# Patient Record
Sex: FEMALE | Race: BLACK OR AFRICAN AMERICAN | Employment: OTHER | URBAN - METROPOLITAN AREA
[De-identification: names, ages, dates, MRNs, and addresses within clinical notes are randomized per-mention and may not be internally consistent; named-entity substitution may affect disease eponyms.]

---

## 2017-01-03 ENCOUNTER — HOSPITAL ENCOUNTER (EMERGENCY)
Age: 77
Discharge: HOME | End: 2017-01-03
Attending: EMERGENCY MEDICINE
Payer: MEDICARE

## 2017-01-03 ENCOUNTER — APPOINTMENT (OUTPATIENT)
Dept: CT IMAGING | Age: 77
End: 2017-01-03
Attending: EMERGENCY MEDICINE
Payer: MEDICARE

## 2017-01-03 ENCOUNTER — APPOINTMENT (OUTPATIENT)
Dept: GENERAL RADIOLOGY | Age: 77
End: 2017-01-03
Attending: EMERGENCY MEDICINE
Payer: MEDICARE

## 2017-01-03 VITALS
HEIGHT: 64 IN | OXYGEN SATURATION: 97 % | SYSTOLIC BLOOD PRESSURE: 125 MMHG | TEMPERATURE: 98.5 F | BODY MASS INDEX: 25.61 KG/M2 | HEART RATE: 78 BPM | RESPIRATION RATE: 23 BRPM | WEIGHT: 150 LBS | DIASTOLIC BLOOD PRESSURE: 53 MMHG

## 2017-01-03 DIAGNOSIS — E86.0 DEHYDRATION: Primary | ICD-10-CM

## 2017-01-03 DIAGNOSIS — I50.9 CONGESTIVE HEART FAILURE, UNSPECIFIED CONGESTIVE HEART FAILURE CHRONICITY, UNSPECIFIED CONGESTIVE HEART FAILURE TYPE: ICD-10-CM

## 2017-01-03 DIAGNOSIS — N39.0 URINARY TRACT INFECTION WITH HEMATURIA, SITE UNSPECIFIED: ICD-10-CM

## 2017-01-03 DIAGNOSIS — R31.9 URINARY TRACT INFECTION WITH HEMATURIA, SITE UNSPECIFIED: ICD-10-CM

## 2017-01-03 LAB
ALBUMIN SERPL BCP-MCNC: 2.9 G/DL (ref 3.5–5)
ALBUMIN/GLOB SERPL: 0.7 {RATIO} (ref 1.1–2.2)
ALP SERPL-CCNC: 103 U/L (ref 45–117)
ALT SERPL-CCNC: 56 U/L (ref 12–78)
ANION GAP BLD CALC-SCNC: 8 MMOL/L (ref 5–15)
APPEARANCE UR: ABNORMAL
AST SERPL W P-5'-P-CCNC: 46 U/L (ref 15–37)
BACTERIA URNS QL MICRO: ABNORMAL /HPF
BASOPHILS # BLD AUTO: 0 K/UL (ref 0–0.1)
BASOPHILS # BLD: 0 % (ref 0–1)
BILIRUB SERPL-MCNC: 0.5 MG/DL (ref 0.2–1)
BILIRUB UR QL: NEGATIVE
BNP SERPL-MCNC: ABNORMAL PG/ML (ref 0–450)
BUN SERPL-MCNC: 27 MG/DL (ref 6–20)
BUN/CREAT SERPL: 26 (ref 12–20)
CALCIUM SERPL-MCNC: 8.3 MG/DL (ref 8.5–10.1)
CHLORIDE SERPL-SCNC: 109 MMOL/L (ref 97–108)
CK MB CFR SERPL CALC: 1.9 % (ref 0–2.5)
CK MB SERPL-MCNC: 1.2 NG/ML (ref 5–25)
CK SERPL-CCNC: 62 U/L (ref 26–192)
CO2 SERPL-SCNC: 25 MMOL/L (ref 21–32)
COLOR UR: ABNORMAL
CREAT SERPL-MCNC: 1.03 MG/DL (ref 0.55–1.02)
EOSINOPHIL # BLD: 0.1 K/UL (ref 0–0.4)
EOSINOPHIL NFR BLD: 1 % (ref 0–7)
EPITH CASTS URNS QL MICRO: ABNORMAL /LPF
ERYTHROCYTE [DISTWIDTH] IN BLOOD BY AUTOMATED COUNT: 13.2 % (ref 11.5–14.5)
GLOBULIN SER CALC-MCNC: 4.3 G/DL (ref 2–4)
GLUCOSE SERPL-MCNC: 118 MG/DL (ref 65–100)
GLUCOSE UR STRIP.AUTO-MCNC: NEGATIVE MG/DL
HCT VFR BLD AUTO: 39.6 % (ref 35–47)
HGB BLD-MCNC: 12.4 G/DL (ref 11.5–16)
HGB UR QL STRIP: ABNORMAL
KETONES UR QL STRIP.AUTO: ABNORMAL MG/DL
LEUKOCYTE ESTERASE UR QL STRIP.AUTO: ABNORMAL
LIPASE SERPL-CCNC: 111 U/L (ref 73–393)
LYMPHOCYTES # BLD AUTO: 19 % (ref 12–49)
LYMPHOCYTES # BLD: 1.2 K/UL (ref 0.8–3.5)
MCH RBC QN AUTO: 31.8 PG (ref 26–34)
MCHC RBC AUTO-ENTMCNC: 31.3 G/DL (ref 30–36.5)
MCV RBC AUTO: 101.5 FL (ref 80–99)
MONOCYTES # BLD: 0.5 K/UL (ref 0–1)
MONOCYTES NFR BLD AUTO: 8 % (ref 5–13)
NEUTS SEG # BLD: 4.3 K/UL (ref 1.8–8)
NEUTS SEG NFR BLD AUTO: 72 % (ref 32–75)
NITRITE UR QL STRIP.AUTO: POSITIVE
PH UR STRIP: 5.5 [PH] (ref 5–8)
PLATELET # BLD AUTO: 201 K/UL (ref 150–400)
POTASSIUM SERPL-SCNC: 4.6 MMOL/L (ref 3.5–5.1)
PROT SERPL-MCNC: 7.2 G/DL (ref 6.4–8.2)
PROT UR STRIP-MCNC: 30 MG/DL
RBC # BLD AUTO: 3.9 M/UL (ref 3.8–5.2)
RBC #/AREA URNS HPF: ABNORMAL /HPF (ref 0–5)
SODIUM SERPL-SCNC: 142 MMOL/L (ref 136–145)
SP GR UR REFRACTOMETRY: 1.03 (ref 1–1.03)
TROPONIN I SERPL-MCNC: 0.05 NG/ML
UROBILINOGEN UR QL STRIP.AUTO: 1 EU/DL (ref 0.2–1)
WBC # BLD AUTO: 6 K/UL (ref 3.6–11)
WBC URNS QL MICRO: >100 /HPF (ref 0–4)

## 2017-01-03 PROCEDURE — 85025 COMPLETE CBC W/AUTO DIFF WBC: CPT | Performed by: EMERGENCY MEDICINE

## 2017-01-03 PROCEDURE — 74011000258 HC RX REV CODE- 258: Performed by: EMERGENCY MEDICINE

## 2017-01-03 PROCEDURE — 99285 EMERGENCY DEPT VISIT HI MDM: CPT

## 2017-01-03 PROCEDURE — 96365 THER/PROPH/DIAG IV INF INIT: CPT

## 2017-01-03 PROCEDURE — 80053 COMPREHEN METABOLIC PANEL: CPT | Performed by: EMERGENCY MEDICINE

## 2017-01-03 PROCEDURE — 82550 ASSAY OF CK (CPK): CPT | Performed by: EMERGENCY MEDICINE

## 2017-01-03 PROCEDURE — 81001 URINALYSIS AUTO W/SCOPE: CPT | Performed by: EMERGENCY MEDICINE

## 2017-01-03 PROCEDURE — 74011250636 HC RX REV CODE- 250/636: Performed by: EMERGENCY MEDICINE

## 2017-01-03 PROCEDURE — 96361 HYDRATE IV INFUSION ADD-ON: CPT

## 2017-01-03 PROCEDURE — 51701 INSERT BLADDER CATHETER: CPT

## 2017-01-03 PROCEDURE — 83690 ASSAY OF LIPASE: CPT | Performed by: EMERGENCY MEDICINE

## 2017-01-03 PROCEDURE — 70450 CT HEAD/BRAIN W/O DYE: CPT

## 2017-01-03 PROCEDURE — 93005 ELECTROCARDIOGRAM TRACING: CPT

## 2017-01-03 PROCEDURE — 84484 ASSAY OF TROPONIN QUANT: CPT | Performed by: EMERGENCY MEDICINE

## 2017-01-03 PROCEDURE — 36415 COLL VENOUS BLD VENIPUNCTURE: CPT | Performed by: EMERGENCY MEDICINE

## 2017-01-03 PROCEDURE — 83880 ASSAY OF NATRIURETIC PEPTIDE: CPT | Performed by: EMERGENCY MEDICINE

## 2017-01-03 PROCEDURE — 77030011943

## 2017-01-03 PROCEDURE — 71010 XR CHEST PORT: CPT

## 2017-01-03 RX ORDER — CEPHALEXIN 500 MG/1
500 CAPSULE ORAL 2 TIMES DAILY
Qty: 14 CAP | Refills: 0 | Status: SHIPPED | OUTPATIENT
Start: 2017-01-03 | End: 2017-01-22

## 2017-01-03 RX ADMIN — SODIUM CHLORIDE 1000 ML: 900 INJECTION, SOLUTION INTRAVENOUS at 20:40

## 2017-01-03 RX ADMIN — CEFTRIAXONE 1 G: 1 INJECTION, POWDER, FOR SOLUTION INTRAMUSCULAR; INTRAVENOUS at 21:41

## 2017-01-03 NOTE — Clinical Note
Thank you for allowing us to provide you with medical care today. We realize that you have many choices for your emergency care needs. We thank you for choosing [facility]. Please choose us in the future for any continued health care needs. We hope we addressed all of your medical concerns. We strive to provide excellent quality care in the Emergency Department. Anything less than excellent does not meet our expectations. The exam and treatment you received in the Emergency Department  were for an emergent problem and are not intended as complete care. It is important that you follow up with a doctor, nurse practitioner, or [address] [place] [address] assistant for ongoing care. If your symptoms worsen or you do not improve as expected and you are un able to reach your usual health care provider, you should return to the Emergency Department. We are available 24 hours a day. Take this sheet with you when you go to your follow-up visit. If you have any problem arranging the follow-up visit, co ntact the Emergency Department immediately. Make an appointment your family doctor for follow up of this visit. Return to the ER if you are unable to be seen in a timely manner.

## 2017-01-04 LAB
ATRIAL RATE: 77 BPM
CALCULATED P AXIS, ECG09: 60 DEGREES
CALCULATED R AXIS, ECG10: 0 DEGREES
CALCULATED T AXIS, ECG11: 163 DEGREES
DIAGNOSIS, 93000: NORMAL
P-R INTERVAL, ECG05: 200 MS
Q-T INTERVAL, ECG07: 412 MS
QRS DURATION, ECG06: 106 MS
QTC CALCULATION (BEZET), ECG08: 466 MS
VENTRICULAR RATE, ECG03: 77 BPM

## 2017-01-04 NOTE — ED PROVIDER NOTES
HPI Comments: 68 y.o. female with extensive past medical history, please see list, significant for paroxysmal afib, CAD, stroke, HTN, ADHF, mitral regurgitation, dementia, cerebral atrophy, left carotid occlusion, depression, glaucoma, hypertrophic cardiomyopathy, and hypertrophic subaortic stenosis who presents to the ED via EMS with chief complaint of fatigue. EMS reports pt has had fatigue and lethargy today. Per EMS, pt has hx of a stroke in October 2016 and is nonverbal. Per EMS, pt has hx of similar sx with a UTI at the time of her stroke and her family thinks she may have another UTI at present. There are no other acute medical complaints voiced at this time. Full history, physical exam, and ROS unable to be obtained due to: Pt nonverbal.     Social Hx: Lives at the [address] [facility]. PCP: Fortunato Cherry    Note written by Hari Hooper, as dictated by Kraig David MD 7:43 PM     The history is provided by the EMS personnel. History limited by: pt nonverbal.        Past Medical History:   Diagnosis Date    ADHF (acute decompensated heart failure) ([address] 6/24/2015    CAD (coronary artery disease)     Carotid occlusion, left 10/25/2016    Cerebral atrophy 10/25/2016    Dementia     Depression     Glaucoma     Hyperlipidemia LDL goal <70 10/25/2016    Hypertension     Hypertrophic cardiomyopathy ([address] 6/1/2012    Hypertrophic subaortic stenosis (idiopathic) (HCC)     Mitral regurgitation     Musculoskeletal disorder     Paroxysmal atrial fibrillation ([facility] 11/29/2011    Stroke [facility])        Past Surgical History:   Procedure Laterality Date    Cardiac catheterization  2004     Normal cors, EF 60%, significant MR. EDP 23    Echo 2d adult  8/20/2010     HCM, Resting LVOT gradient 129 mmHg, grade 3-4 diastolic dysfunction, MAC, mod-severe MR, marked LAE.      Echo 2d adult  5/27/11     mod-severe LVH, EF 65%, grade 3-4 diastolic dysfunction, LVOT resting gradient 42 mmHg, severe LAE, mod-severe MR, PA 46 mmHg    Pr cardiac surg procedure unlist           Family History:   Problem Relation Age of Onset    Other Other      patient specifically denies the following in 1st degree relative: HTN, DM, CVA, CAD, ca    Heart Disease Mother        Social History     Social History    Marital status:      Spouse name: N/A    Number of children: N/A    Years of education: N/A     Occupational History    Not on file. Social History Main Topics    Smoking status: Never Smoker    Smokeless tobacco: Never Used    Alcohol use No    Drug use: No    Sexual activity: Not on file     Other Topics Concern    Not on file     Social History Narrative         ALLERGIES: Review of patient's allergies indicates no known allergies. Review of Systems   Unable to perform ROS: Patient nonverbal       Vitals:    01/03/17 1942   BP: 134/60   Temp: 98.5 °F (36.9 °C)   SpO2: 94%   Weight: 68 kg (150 lb)   Height: 5' 4\" (1.626 m)            Physical Exam     MDM  ED Course       Procedures    Chief Complaint   Patient presents with    Fatigue       8:02 PM  The patients presenting problems have been discussed, and they are in agreement with the care plan formulated and outlined with them. I have encouraged them to ask questions as they arise throughout their visit. MEDICATIONS GIVEN:  Medications - No data to display    LABS REVIEWED:  Labs Reviewed   TROPONIN I   URINALYSIS W/MICROSCOPIC   LIPASE   METABOLIC PANEL, COMPREHENSIVE   CK W/ CKMB & INDEX   CBC WITH AUTOMATED DIFF   PRO-BNP       RADIOLOGY RESULTS:  The following have been ordered and reviewed:  _____________________________________________________________________  _____________________________________________________________________    EKG interpretation: (Preliminary)  Rhythm: normal sinus rhythm in a LBBB/ LVH Pattern; and regular .  Rate (approx.): 76; Axis: normal; P wave: normal; QRS interval: normal ; ST/T wave: normal; Negative acute significant segmental elevations/ improved compared to study dated 10/26/2016    PROCEDURES:        CONSULTATIONS:       PROGRESS NOTES:      DIAGNOSIS:    1. Dehydration    2. Urinary tract infection with hematuria, site unspecified    3. Congestive heart failure, unspecified congestive heart failure chronicity, unspecified congestive heart failure type ([facility]        PLAN:  1- dehydration/ CHF - discussed need for balance; close monitoring;   2 UTI - roce/ keflex;       ED COURSE: The patients hospital course has been uncomplicated. PROGRESS NOTE:  8:30 PM   Family now at bedside. Leda Jurado they are concerned pt has a UTI because she's been sleeping all day and not waking up. Pt is awake at this time. 9:55 PM per family 'she seems better now'; they agree w/ plans; Mildred Damian's  results have been reviewed with her. She has been counseled regarding her diagnosis. She verbally conveys understanding and agreement of the signs, symptoms, diagnosis, treatment and prognosis and additionally agrees to Call/ Arrange follow up as recommended with Dr. Moustapha Gómez in 24 - 48 hours. She also agrees with the care-plan and conveys that all of her questions have been answered. I have also put together some discharge instructions for her that include: 1) educational information regarding their diagnosis, 2) how to care for their diagnosis at home, as well a 3) list of reasons why they would want to return to the ED prior to their follow-up appointment, should their condition change or for concerns.

## 2017-01-04 NOTE — ED TRIAGE NOTES
Pt arrived via EMS, daughter/grandaughter c/o fatigue and sleeping all day. EMS reports that she presented this way when she has her stoke and she had a UTI.

## 2017-01-04 NOTE — DISCHARGE INSTRUCTIONS
Urinary Tract Infection in Women: Care Instructions  Your Care Instructions    A urinary tract infection, or UTI, is a general term for an infection anywhere between the kidneys and the urethra (where urine comes out). Most UTIs are bladder infections. They often cause pain or burning when you urinate. UTIs are caused by bacteria and can be cured with antibiotics. Be sure to complete your treatment so that the infection goes away. Follow-up care is a key part of your treatment and safety. Be sure to make and go to all appointments, and call your doctor if you are having problems. It's also a good idea to know your test results and keep a list of the medicines you take. How can you care for yourself at home? · Take your antibiotics as directed. Do not stop taking them just because you feel better. You need to take the full course of antibiotics. · Drink extra water and other fluids for the next day or two. This may help wash out the bacteria that are causing the infection. (If you have kidney, heart, or liver disease and have to limit fluids, talk with your doctor before you increase your fluid intake.)  · Avoid drinks that are carbonated or have caffeine. They can irritate the bladder. · Urinate often. Try to empty your bladder each time. · To relieve pain, take a hot bath or lay a heating pad set on low over your lower belly or genital area. Never go to sleep with a heating pad in place. To prevent UTIs  · Drink plenty of water each day. This helps you urinate often, which clears bacteria from your system. (If you have kidney, heart, or liver disease and have to limit fluids, talk with your doctor before you increase your fluid intake.)  · Consider adding cranberry juice to your diet. · Urinate when you need to. · Urinate right after you have sex. · Change sanitary pads often.   · Avoid douches, bubble baths, feminine hygiene sprays, and other feminine hygiene products that have deodorants. · After going to the bathroom, wipe from front to back. When should you call for help? Call your doctor now or seek immediate medical care if:  · Symptoms such as fever, chills, nausea, or vomiting get worse or appear for the first time. · You have new pain in your back just below your rib cage. This is called flank pain. · There is new blood or pus in your urine. · You have any problems with your antibiotic medicine. Watch closely for changes in your health, and be sure to contact your doctor if:  · You are not getting better after taking an antibiotic for 2 days. · Your symptoms go away but then come back. Where can you learn more? Go to http://shavon-shirley.info/. Enter Y733 in the search box to learn more about \"Urinary Tract Infection in Women: Care Instructions. \"  Current as of: August 12, 2016  Content Version: 11.1  © 3084-6897 [employer]. Care instructions adapted under license by [employer] (which disclaims liability or warranty for this information). If you have questions about a medical condition or this instruction, always ask your healthcare professional. [place] [address] any warranty or liability for your use of this information. Dehydration: Care Instructions  Your Care Instructions  Dehydration happens when your body loses too much fluid. This might happen when you do not drink enough water or you lose large amounts of fluids from your body because of diarrhea, vomiting, or sweating. Severe dehydration can be life-threatening. Water and minerals called electrolytes help put your body fluids back in balance. Learn the early signs of fluid loss, and drink more fluids to prevent dehydration. Follow-up care is a key part of your treatment and safety. Be sure to make and go to all appointments, and call your doctor if you are having problems.  It's also a good idea to know your test results and keep a list of the medicines you take. How can you care for yourself at home? · To prevent dehydration, drink plenty of fluids, enough so that your urine is light yellow or clear like water. Choose water and other caffeine-free clear liquids until you feel better. If you have kidney, heart, or liver disease and have to limit fluids, talk with your doctor before you increase the amount of fluids you drink. · If you do not feel like eating or drinking, try taking small sips of water, sports drinks, or other rehydration drinks. · Get plenty of rest.  To prevent dehydration  · Add more fluids to your diet and daily routine, unless your doctor has told you not to. · During hot weather, drink more fluids. Drink even more fluids if you exercise a lot. Stay away from drinks with alcohol or caffeine. · Watch for the symptoms of dehydration. These include:  ¨ A dry, sticky mouth. ¨ Dark yellow urine, and not much of it. ¨ Dry and sunken eyes. ¨ Feeling very tired. · Learn what problems can lead to dehydration. These include:  ¨ Diarrhea, fever, and vomiting. ¨ Any illness with a fever, such as pneumonia or the flu. ¨ Activities that cause heavy sweating, such as endurance races and heavy outdoor work in hot or humid weather. ¨ Alcohol or drug abuse or withdrawal.  ¨ Certain medicines, such as cold and allergy pills (antihistamines), diet pills (diuretics), and laxatives. ¨ Certain diseases, such as diabetes, cancer, and heart or kidney disease. When should you call for help? Call 911 anytime you think you may need emergency care. For example, call if:  · You passed out (lost consciousness). Call your doctor now or seek immediate medical care if:  · You are confused and cannot think clearly. · You are dizzy or lightheaded, or you feel like you may faint. · You have signs of needing more fluids. You have sunken eyes and a dry mouth, and you pass only a little dark urine. · You cannot keep fluids down.   Watch closely for changes in your health, and be sure to contact your doctor if:  · You are not making tears. · Your skin is very dry and sags slowly back into place after you pinch it. · Your mouth and eyes are very dry. Where can you learn more? Go to http://shavon-shirley.info/. Enter F309 in the search box to learn more about \"Dehydration: Care Instructions. \"  Current as of: May 27, 2016  Content Version: 11.1  © 6387-0152 [employer]. Care instructions adapted under license by [employer] (which disclaims liability or warranty for this information). If you have questions about a medical condition or this instruction, always ask your healthcare professional. [address] any warranty or liability for your use of this information. We hope that we have addressed all of your medical concerns. The examination and treatment you received in the Emergency Department were for an emergent problem and were not intended as complete care. It is important that you follow up with your healthcare provider(s) for ongoing care. If your symptoms worsen or do not improve as expected, and you are unable to reach your usual health care provider(s), you should return to the Emergency Department. Today's healthcare is undergoing tremendous change, and patient satisfaction surveys are one of the many tools to assess the quality of medical care. You may receive a survey from the [employer] regarding your experience in the Emergency Department. I hope that your experience has been completely positive, particularly the medical care that I provided. As such, please participate in the survey; anything less than excellent does not meet my expectations or intentions. [facility] and [facility] participate in nationally recognized quality of care measures.   If your blood pressure is greater than 120/80, as reported below, we urge that you seek medical care to address the potential of high blood pressure, commonly known as hypertension. Hypertension can be hereditary or can be caused by certain medical conditions, pain, stress, or \"white coat syndrome. \"       Please make an appointment with your health care provider(s) for follow up of your Emergency Department visit. VITALS:   Patient Vitals for the past 8 hrs:   Temp Pulse Resp BP SpO2   01/03/17 2145 - 77 18 118/58 96 %   01/03/17 2115 - 77 16 116/59 95 %   01/03/17 2030 - 77 18 97/64 94 %   01/03/17 1945 - 78 23 93/68 95 %   01/03/17 1942 98.5 °F (36.9 °C) - - 134/60 94 %          Thank you for allowing us to provide you with medical care today. We realize that you have many choices for your emergency care needs. Please choose us in the future for any continued health care needs. Dore Boas Coyner, [address] [place] [address].   Office: [phone]            Recent Results (from the past 24 hour(s))   TROPONIN I    Collection Time: 01/03/17  8:24 PM   Result Value Ref Range    Troponin-I, Qt. 0.05 (H) <0.05 ng/mL   LIPASE    Collection Time: 01/03/17  8:24 PM   Result Value Ref Range    Lipase 111 73 - 642 U/L   METABOLIC PANEL, COMPREHENSIVE    Collection Time: 01/03/17  8:24 PM   Result Value Ref Range    Sodium 142 136 - 145 mmol/L    Potassium 4.6 3.5 - 5.1 mmol/L    Chloride 109 (H) 97 - 108 mmol/L    CO2 25 21 - 32 mmol/L    Anion gap 8 5 - 15 mmol/L    Glucose 118 (H) 65 - 100 mg/dL    BUN 27 (H) 6 - 20 MG/DL    Creatinine 1.03 (H) 0.55 - 1.02 MG/DL    BUN/Creatinine ratio 26 (H) 12 - 20      GFR est AA >60 >60 ml/min/1.73m2    GFR est non-AA 52 (L) >60 ml/min/1.73m2    Calcium 8.3 (L) 8.5 - 10.1 MG/DL    Bilirubin, total 0.5 0.2 - 1.0 MG/DL    ALT 56 12 - 78 U/L    AST 46 (H) 15 - 37 U/L    Alk.  phosphatase 103 45 - 117 U/L    Protein, total 7.2 6.4 - 8.2 g/dL    Albumin 2.9 (L) 3.5 - 5.0 g/dL    Globulin 4.3 (H) 2.0 - 4.0 g/dL    A-G Ratio 0.7 (L) 1.1 - 2.2     CK W/ CKMB & INDEX    Collection Time: 01/03/17  8:24 PM   Result Value Ref Range    CK 62 26 - 192 U/L    CK - MB 1.2 <3.6 NG/ML    CK-MB Index 1.9 0 - 2.5     CBC WITH AUTOMATED DIFF    Collection Time: 01/03/17  8:24 PM   Result Value Ref Range    WBC 6.0 3.6 - 11.0 K/uL    RBC 3.90 3.80 - 5.20 M/uL    HGB 12.4 11.5 - 16.0 g/dL    HCT 39.6 35.0 - 47.0 %    .5 (H) 80.0 - 99.0 FL    MCH 31.8 26.0 - 34.0 PG    MCHC 31.3 30.0 - 36.5 g/dL    RDW 13.2 11.5 - 14.5 %    PLATELET 627 249 - 703 K/uL    NEUTROPHILS 72 32 - 75 %    LYMPHOCYTES 19 12 - 49 %    MONOCYTES 8 5 - 13 %    EOSINOPHILS 1 0 - 7 %    BASOPHILS 0 0 - 1 %    ABS. NEUTROPHILS 4.3 1.8 - 8.0 K/UL    ABS. LYMPHOCYTES 1.2 0.8 - 3.5 K/UL    ABS. MONOCYTES 0.5 0.0 - 1.0 K/UL    ABS. EOSINOPHILS 0.1 0.0 - 0.4 K/UL    ABS. BASOPHILS 0.0 0.0 - 0.1 K/UL   PRO-BNP    Collection Time: 01/03/17  8:24 PM   Result Value Ref Range    NT pro-BNP 82390 (H) 0 - 450 PG/ML   URINALYSIS W/MICROSCOPIC    Collection Time: 01/03/17  8:38 PM   Result Value Ref Range    Color DARK YELLOW      Appearance TURBID (A) CLEAR      Specific gravity 1.028 1.003 - 1.030      pH (UA) 5.5 5.0 - 8.0      Protein 30 (A) NEG mg/dL    Glucose NEGATIVE  NEG mg/dL    Ketone TRACE (A) NEG mg/dL    Bilirubin NEGATIVE  NEG      Blood LARGE (A) NEG      Urobilinogen 1.0 0.2 - 1.0 EU/dL    Nitrites POSITIVE (A) NEG      Leukocyte Esterase LARGE (A) NEG      WBC >100 (H) 0 - 4 /hpf    RBC 20-50 0 - 5 /hpf    Epithelial cells FEW FEW /lpf    Bacteria 4+ (A) NEG /hpf       Ct Head Wo Cont    Result Date: 1/3/2017  EXAM:  CT HEAD WO CONT INDICATION:   Pain COMPARISON: CT of the head, 10/25/2016. TECHNIQUE: Unenhanced CT of the head was performed using 5 mm images. Brain and bone windows were generated. CT dose reduction was achieved through use of a standardized protocol tailored for this examination and automatic exposure control for dose modulation. FINDINGS: Area of diminished attenuation in the posterior, watershed territory of the left hemisphere. Global atrophy with ex vacuo dilation of the ventricular system. Encephalomalacia in the left parietal lobe, left temporal lobe and right temporal lobe. Calcifications in the basal ganglia. Subcortical and periventricular white matter hypoattenuation. The basilar cisterns are open. No acute infarct is identified. The bone windows demonstrate no abnormalities. The visualized portions of the paranasal sinuses and mastoid air cells are clear. IMPRESSION: 1. Diminished attenuation the posterior, watershed territory of the left hemisphere. Findings are nonspecific and could represent ischemia. Xr Chest Port    Result Date: 1/3/2017  EXAM:  XR CHEST PORT. INDICATION: Chest pain. COMPARISON: 10/24/2016. FINDINGS: A portable AP radiograph of the chest was obtained at 2011 hours. Lines and tubes: The patient is on a cardiac monitor. Lungs: The lungs are clear. Pleura: There is no pneumothorax or pleural effusion. Mediastinum: The heart is enlarged and the aorta is atherosclerotic. Bones and soft tissues: There is dextroconvex scoliosis and degenerative change of the spine and the bones are mild osteopenic. IMPRESSION: Cardiomegaly. No acute abnormality.

## 2017-01-04 NOTE — ED NOTES
RN to bedside to assist tech with IV flush; questionable rhythm on monitor; realtime report printed and immediately given to Dr. Li Cook who states no changes from previous EKG; pt OK at this time with NAD; no new orders.

## 2017-01-09 ENCOUNTER — APPOINTMENT (OUTPATIENT)
Dept: CT IMAGING | Age: 77
DRG: 300 | End: 2017-01-09
Attending: PHYSICIAN ASSISTANT
Payer: MEDICARE

## 2017-01-09 ENCOUNTER — TELEPHONE (OUTPATIENT)
Dept: CARDIOLOGY CLINIC | Age: 77
End: 2017-01-09

## 2017-01-09 ENCOUNTER — HOSPITAL ENCOUNTER (INPATIENT)
Age: 77
LOS: 13 days | Discharge: HOSPICE | DRG: 300 | End: 2017-01-22
Attending: EMERGENCY MEDICINE | Admitting: INTERNAL MEDICINE
Payer: MEDICARE

## 2017-01-09 DIAGNOSIS — N39.0 URINARY TRACT INFECTION, SITE UNSPECIFIED: ICD-10-CM

## 2017-01-09 DIAGNOSIS — G30.1 LATE ONSET ALZHEIMER'S DISEASE WITH BEHAVIORAL DISTURBANCE (HCC): ICD-10-CM

## 2017-01-09 DIAGNOSIS — I82.4Y2 ACUTE DEEP VEIN THROMBOSIS (DVT) OF PROXIMAL VEIN OF LEFT LOWER EXTREMITY (HCC): Primary | ICD-10-CM

## 2017-01-09 DIAGNOSIS — Z79.01 CHRONIC ANTICOAGULATION: ICD-10-CM

## 2017-01-09 DIAGNOSIS — F02.818 LATE ONSET ALZHEIMER'S DISEASE WITH BEHAVIORAL DISTURBANCE (HCC): ICD-10-CM

## 2017-01-09 DIAGNOSIS — Z71.89 GOALS OF CARE, COUNSELING/DISCUSSION: ICD-10-CM

## 2017-01-09 PROBLEM — E86.0 DEHYDRATION: Status: ACTIVE | Noted: 2017-01-09

## 2017-01-09 PROBLEM — I82.409 DEEP VENOUS THROMBOSIS (HCC): Status: ACTIVE | Noted: 2017-01-09

## 2017-01-09 PROBLEM — N17.9 ARF (ACUTE RENAL FAILURE) (HCC): Status: ACTIVE | Noted: 2017-01-09

## 2017-01-09 LAB
ANION GAP BLD CALC-SCNC: 9 MMOL/L (ref 5–15)
APPEARANCE UR: ABNORMAL
BACTERIA URNS QL MICRO: NEGATIVE /HPF
BASOPHILS # BLD AUTO: 0.1 K/UL (ref 0–0.1)
BASOPHILS # BLD: 1 % (ref 0–1)
BILIRUB UR QL: NEGATIVE
BUN SERPL-MCNC: 25 MG/DL (ref 6–20)
BUN/CREAT SERPL: 21 (ref 12–20)
CALCIUM SERPL-MCNC: 8.7 MG/DL (ref 8.5–10.1)
CHLORIDE SERPL-SCNC: 103 MMOL/L (ref 97–108)
CO2 SERPL-SCNC: 29 MMOL/L (ref 21–32)
COLOR UR: ABNORMAL
CREAT SERPL-MCNC: 1.17 MG/DL (ref 0.55–1.02)
DIFFERENTIAL METHOD BLD: ABNORMAL
EOSINOPHIL # BLD: 0.1 K/UL (ref 0–0.4)
EOSINOPHIL NFR BLD: 1 % (ref 0–7)
EPITH CASTS URNS QL MICRO: ABNORMAL /LPF
ERYTHROCYTE [DISTWIDTH] IN BLOOD BY AUTOMATED COUNT: 12.7 % (ref 11.5–14.5)
GLUCOSE SERPL-MCNC: 113 MG/DL (ref 65–100)
GLUCOSE UR STRIP.AUTO-MCNC: NEGATIVE MG/DL
HCT VFR BLD AUTO: 36.3 % (ref 35–47)
HGB BLD-MCNC: 11.4 G/DL (ref 11.5–16)
HGB UR QL STRIP: ABNORMAL
INR PPP: 1.3 (ref 0.9–1.1)
KETONES UR QL STRIP.AUTO: NEGATIVE MG/DL
LEUKOCYTE ESTERASE UR QL STRIP.AUTO: ABNORMAL
LYMPHOCYTES # BLD AUTO: 19 % (ref 12–49)
LYMPHOCYTES # BLD: 2.1 K/UL (ref 0.8–3.5)
MCH RBC QN AUTO: 31.7 PG (ref 26–34)
MCHC RBC AUTO-ENTMCNC: 31.4 G/DL (ref 30–36.5)
MCV RBC AUTO: 100.8 FL (ref 80–99)
MONOCYTES # BLD: 0.9 K/UL (ref 0–1)
MONOCYTES NFR BLD AUTO: 8 % (ref 5–13)
NEUTS SEG # BLD: 7.8 K/UL (ref 1.8–8)
NEUTS SEG NFR BLD AUTO: 71 % (ref 32–75)
NITRITE UR QL STRIP.AUTO: NEGATIVE
PH UR STRIP: 5 [PH] (ref 5–8)
PLATELET # BLD AUTO: 156 K/UL (ref 150–400)
POTASSIUM SERPL-SCNC: 4.3 MMOL/L (ref 3.5–5.1)
PROT UR STRIP-MCNC: NEGATIVE MG/DL
PROTHROMBIN TIME: 12.7 SEC (ref 9–11.1)
RBC # BLD AUTO: 3.6 M/UL (ref 3.8–5.2)
RBC #/AREA URNS HPF: ABNORMAL /HPF (ref 0–5)
RBC MORPH BLD: ABNORMAL
SODIUM SERPL-SCNC: 141 MMOL/L (ref 136–145)
SP GR UR REFRACTOMETRY: 1.03 (ref 1–1.03)
UA: UC IF INDICATED,UAUC: ABNORMAL
UROBILINOGEN UR QL STRIP.AUTO: 0.2 EU/DL (ref 0.2–1)
WBC # BLD AUTO: 11 K/UL (ref 3.6–11)
WBC URNS QL MICRO: ABNORMAL /HPF (ref 0–4)

## 2017-01-09 PROCEDURE — 96360 HYDRATION IV INFUSION INIT: CPT

## 2017-01-09 PROCEDURE — 65660000000 HC RM CCU STEPDOWN

## 2017-01-09 PROCEDURE — 74011000250 HC RX REV CODE- 250: Performed by: INTERNAL MEDICINE

## 2017-01-09 PROCEDURE — 81001 URINALYSIS AUTO W/SCOPE: CPT | Performed by: PHYSICIAN ASSISTANT

## 2017-01-09 PROCEDURE — 77030011943

## 2017-01-09 PROCEDURE — 65270000029 HC RM PRIVATE

## 2017-01-09 PROCEDURE — 85610 PROTHROMBIN TIME: CPT | Performed by: PHYSICIAN ASSISTANT

## 2017-01-09 PROCEDURE — 74011250636 HC RX REV CODE- 250/636: Performed by: PHYSICIAN ASSISTANT

## 2017-01-09 PROCEDURE — 93005 ELECTROCARDIOGRAM TRACING: CPT

## 2017-01-09 PROCEDURE — 93971 EXTREMITY STUDY: CPT

## 2017-01-09 PROCEDURE — 71275 CT ANGIOGRAPHY CHEST: CPT

## 2017-01-09 PROCEDURE — 87186 SC STD MICRODIL/AGAR DIL: CPT | Performed by: EMERGENCY MEDICINE

## 2017-01-09 PROCEDURE — 96361 HYDRATE IV INFUSION ADD-ON: CPT

## 2017-01-09 PROCEDURE — 80048 BASIC METABOLIC PNL TOTAL CA: CPT | Performed by: PHYSICIAN ASSISTANT

## 2017-01-09 PROCEDURE — 74011250637 HC RX REV CODE- 250/637: Performed by: INTERNAL MEDICINE

## 2017-01-09 PROCEDURE — 36415 COLL VENOUS BLD VENIPUNCTURE: CPT | Performed by: PHYSICIAN ASSISTANT

## 2017-01-09 PROCEDURE — 99285 EMERGENCY DEPT VISIT HI MDM: CPT

## 2017-01-09 PROCEDURE — 85025 COMPLETE CBC W/AUTO DIFF WBC: CPT | Performed by: PHYSICIAN ASSISTANT

## 2017-01-09 PROCEDURE — 74011636320 HC RX REV CODE- 636/320: Performed by: RADIOLOGY

## 2017-01-09 PROCEDURE — 74011250636 HC RX REV CODE- 250/636: Performed by: INTERNAL MEDICINE

## 2017-01-09 PROCEDURE — 87077 CULTURE AEROBIC IDENTIFY: CPT | Performed by: EMERGENCY MEDICINE

## 2017-01-09 PROCEDURE — 87086 URINE CULTURE/COLONY COUNT: CPT | Performed by: EMERGENCY MEDICINE

## 2017-01-09 RX ORDER — DISOPYRAMIDE PHOSPHATE 100 MG/1
100 CAPSULE ORAL 2 TIMES DAILY
Status: DISCONTINUED | OUTPATIENT
Start: 2017-01-09 | End: 2017-01-22 | Stop reason: HOSPADM

## 2017-01-09 RX ORDER — ONDANSETRON 2 MG/ML
4 INJECTION INTRAMUSCULAR; INTRAVENOUS
Status: DISCONTINUED | OUTPATIENT
Start: 2017-01-09 | End: 2017-01-22 | Stop reason: HOSPADM

## 2017-01-09 RX ORDER — ENOXAPARIN SODIUM 100 MG/ML
1 INJECTION SUBCUTANEOUS
Status: COMPLETED | OUTPATIENT
Start: 2017-01-09 | End: 2017-01-09

## 2017-01-09 RX ORDER — HYDROCODONE BITARTRATE AND ACETAMINOPHEN 5; 325 MG/1; MG/1
1 TABLET ORAL
Status: DISCONTINUED | OUTPATIENT
Start: 2017-01-09 | End: 2017-01-22 | Stop reason: HOSPADM

## 2017-01-09 RX ORDER — ACETAMINOPHEN 325 MG/1
650 TABLET ORAL
Status: DISCONTINUED | OUTPATIENT
Start: 2017-01-09 | End: 2017-01-22 | Stop reason: HOSPADM

## 2017-01-09 RX ORDER — DIPHENHYDRAMINE HCL 25 MG
25 CAPSULE ORAL
Status: DISCONTINUED | OUTPATIENT
Start: 2017-01-09 | End: 2017-01-22 | Stop reason: HOSPADM

## 2017-01-09 RX ORDER — NALOXONE HYDROCHLORIDE 0.4 MG/ML
0.4 INJECTION, SOLUTION INTRAMUSCULAR; INTRAVENOUS; SUBCUTANEOUS AS NEEDED
Status: DISCONTINUED | OUTPATIENT
Start: 2017-01-09 | End: 2017-01-22 | Stop reason: HOSPADM

## 2017-01-09 RX ORDER — QUETIAPINE FUMARATE 25 MG/1
12.5 TABLET, FILM COATED ORAL
Status: DISCONTINUED | OUTPATIENT
Start: 2017-01-09 | End: 2017-01-22 | Stop reason: HOSPADM

## 2017-01-09 RX ORDER — POTASSIUM CHLORIDE 20 MEQ/1
20 TABLET, EXTENDED RELEASE ORAL DAILY
COMMUNITY
End: 2017-01-22 | Stop reason: SDUPTHER

## 2017-01-09 RX ORDER — WARFARIN 6 MG/1
6 TABLET ORAL 2 TIMES WEEKLY
COMMUNITY
End: 2017-01-22 | Stop reason: SDUPTHER

## 2017-01-09 RX ORDER — WARFARIN SODIUM 5 MG/1
5 TABLET ORAL
Status: COMPLETED | OUTPATIENT
Start: 2017-01-09 | End: 2017-01-09

## 2017-01-09 RX ORDER — DORZOLAMIDE HYDROCHLORIDE AND TIMOLOL MALEATE 20; 5 MG/ML; MG/ML
1 SOLUTION/ DROPS OPHTHALMIC 2 TIMES DAILY
Status: DISCONTINUED | OUTPATIENT
Start: 2017-01-09 | End: 2017-01-22 | Stop reason: HOSPADM

## 2017-01-09 RX ORDER — METOPROLOL TARTRATE 25 MG/1
25 TABLET, FILM COATED ORAL 2 TIMES DAILY
Status: CANCELLED | OUTPATIENT
Start: 2017-01-09

## 2017-01-09 RX ORDER — SODIUM CHLORIDE 9 MG/ML
50 INJECTION, SOLUTION INTRAVENOUS CONTINUOUS
Status: DISCONTINUED | OUTPATIENT
Start: 2017-01-09 | End: 2017-01-22 | Stop reason: HOSPADM

## 2017-01-09 RX ORDER — ENOXAPARIN SODIUM 100 MG/ML
1 INJECTION SUBCUTANEOUS EVERY 12 HOURS
Status: DISCONTINUED | OUTPATIENT
Start: 2017-01-10 | End: 2017-01-13

## 2017-01-09 RX ORDER — MEMANTINE HYDROCHLORIDE 28 MG/1
28 CAPSULE, EXTENDED RELEASE ORAL DAILY
Status: DISCONTINUED | OUTPATIENT
Start: 2017-01-10 | End: 2017-01-22 | Stop reason: HOSPADM

## 2017-01-09 RX ORDER — QUETIAPINE FUMARATE 25 MG/1
12.5 TABLET, FILM COATED ORAL
COMMUNITY
End: 2017-01-22

## 2017-01-09 RX ADMIN — WARFARIN SODIUM 5 MG: 5 TABLET ORAL at 22:18

## 2017-01-09 RX ADMIN — SODIUM CHLORIDE 500 ML: 900 INJECTION, SOLUTION INTRAVENOUS at 17:24

## 2017-01-09 RX ADMIN — ENOXAPARIN SODIUM 60 MG: 60 INJECTION SUBCUTANEOUS at 19:50

## 2017-01-09 RX ADMIN — QUETIAPINE FUMARATE 12.5 MG: 25 TABLET, FILM COATED ORAL at 23:36

## 2017-01-09 RX ADMIN — SODIUM CHLORIDE 150 ML/HR: 900 INJECTION, SOLUTION INTRAVENOUS at 19:51

## 2017-01-09 RX ADMIN — BIMATOPROST 1 DROP: 0.1 SOLUTION/ DROPS OPHTHALMIC at 23:40

## 2017-01-09 RX ADMIN — BRIMONIDINE TARTRATE 1 DROP: 1 SOLUTION/ DROPS OPHTHALMIC at 23:40

## 2017-01-09 RX ADMIN — IOPAMIDOL 70 ML: 755 INJECTION, SOLUTION INTRAVENOUS at 16:55

## 2017-01-09 RX ADMIN — METOPROLOL TARTRATE 75 MG: 25 TABLET ORAL at 23:36

## 2017-01-09 NOTE — ED TRIAGE NOTES
Pt arrived from [facility]. Had venous doppler yesterday and is + for DVT in left leg. Pt has hx of CVA w/residual aphasia and unable to answer questions. Additional paperwork with pt. Will review.

## 2017-01-09 NOTE — TELEPHONE ENCOUNTER
Spoke with Alejandra Brown from the [address] [place] [address]. Patient has been transferred to the ED.

## 2017-01-09 NOTE — IP AVS SNAPSHOT
Current Discharge Medication List  
  
Take these medications at their scheduled times Dose & Instructions Dispensing Information Comments Morning Noon Evening Bedtime ALPHAGAN P 0.1 % ophthalmic solution Generic drug:  brimonidine Your next dose is: Today, Tomorrow Other:  ____________ Dose:  1 Drop Administer 1 Drop to right eye two (2) times a day. Refills:  0  
     
   
   
   
  
 bimatoprost 0.01 % ophthalmic drops Commonly known as:  LUMIGAN Your next dose is: Today, Tomorrow Other:  ____________ Dose:  1 Drop Administer 1 Drop to right eye nightly. Refills:  0  
     
   
   
   
  
 bumetanide 0.5 mg tablet Commonly known as:  Ewa Ortiz Your next dose is: Today, Tomorrow Other:  ____________ Dose:  0.5 mg Take 0.5 mg by mouth three (3) days a week. Patient takes on Monday, Wednesday, Friday Refills:  0  
     
   
   
   
  
 dorzolamide-timolol 22.3-6.8 mg/mL ophthalmic solution Commonly known as:  COSOPT Your next dose is: Today, Tomorrow Other:  ____________ Dose:  1 Drop Administer 1 Drop to right eye two (2) times a day. Refills:  0  
     
   
   
   
  
 memantine ER 28 mg capsule Commonly known as:  NAMENDA XR Your next dose is: Today, Tomorrow Other:  ____________ Dose:  28 mg Take 1 Cap by mouth daily. Quantity:  30 Cap Refills:  3  
     
   
   
   
  
 potassium chloride 20 mEq tablet Commonly known as:  K-DUR, KLOR-CON Your next dose is: Today, Tomorrow Other:  ____________ Dose:  20 mEq Take 20 mEq by mouth daily. Refills:  0 QUEtiapine 25 mg tablet Commonly known as:  SEROquel Your next dose is: Today, Tomorrow Other:  ____________ Dose:  12.5 mg Take 0.5 Tabs by mouth nightly. Indications: post CVA and dementia behavioral disorder Quantity:  30 Tab Refills:  0  
     
   
   
   
  
 * warfarin 4 mg tablet Commonly known as:  COUMADIN Your next dose is: Today, Tomorrow Other:  ____________ Dose:  5 mg Take 5 mg by mouth five (5) days a week. Monday, Tuesday, Wednesday, Thursday, Friday at 5pm  
 Refills:  0  
     
   
   
   
  
 * COUMADIN 6 mg tablet Generic drug:  warfarin Your next dose is: Today, Tomorrow Other:  ____________ Dose:  6 mg Take 6 mg by mouth two (2) times a week. Saturday, Sunday at 5pm  
 Refills:  0  
     
   
   
   
  
 * Notice: This list has 2 medication(s) that are the same as other medications prescribed for you. Read the directions carefully, and ask your doctor or other care provider to review them with you. Take these medications as directed Dose & Instructions Dispensing Information Comments Morning Noon Evening Bedtime  
 disopyramide phosphate 100 mg capsule Commonly known as:  Daiva Florence Your next dose is: Today, Tomorrow Other:  ____________  
   
   
 take 1 capsule by mouth twice a day Quantity:  60 Cap Refills:  3  
     
   
   
   
  
 metoprolol tartrate 25 mg tablet Commonly known as:  LOPRESSOR Your next dose is: Today, Tomorrow Other:  ____________  
   
   
 take 3 tablets by mouth twice a day Quantity:  180 Tab Refills:  5 Where to Get Your Medications Information about where to get these medications is not yet available ! Ask your nurse or doctor about these medications QUEtiapine 25 mg tablet

## 2017-01-09 NOTE — PROCEDURES
Kristian Mtz  *** FINAL REPORT ***    Name: Radha Brewster  MRN: [id number]  : 26 Aug 1940  HIS Order #: [id number]  [id number] [facility] Visit #: [id number]  Date: 2017    TYPE OF TEST: Peripheral Venous Testing    REASON FOR TEST  Limb swelling    Left Leg:-  Deep venous thrombosis:           Yes  Proximal extent of thrombus:      External Iliac  Superficial venous thrombosis:    Yes  Deep venous insufficiency:        No  Superficial venous insufficiency: No      INTERPRETATION/FINDINGS  PROCEDURE:  LEFT LOWER EXTREMITY VENOUS DUPLEX . Evaluation of lower  extremity veins with ultrasound (B-mode imaging, pulsed Doppler, color   Doppler). Includes the common femoral, deep femoral, femoral,  popliteal, posterior tibial, peroneal, and great saphenous veins. Other veins, for example the gastrocnemius and soleal veins, may also  be visualized. FINDINGS: In the left lower extremity occlusive thrombus was seen in  the external iliac extending distally through the femoral segment,  popliteal and into both paired peroneal and 1 of 2 posterior tibial  veins. Thrombus was also noted in the saphenofemoral junction and  proximal to mid great saphenous vein. Gray scale and color duplex  imaging demonstrates compressibility, and augmented flow profiles in  the remaining veins. CONCLUSION: Left lower extremity venous duplex positive for acute deep   venous thrombosis and thrombophlebitis throughout the left leg from  external iliac vein down through the calf. Right common femoral vein  is thrombus free. ADDITIONAL COMMENTS    I have personally reviewed the data relevant to the interpretation of  this  study. TECHNOLOGIST: Bharati Daugherty. [place]  Signed: 2017 04:29 PM    PHYSICIAN: Lindsey Ng.  Tarun Suarez MD  Signed: 01/10/2017 09:44 AM

## 2017-01-09 NOTE — ED PROVIDER NOTES
HPI Comments: 68 y.o. female with past medical history significant for hypertropic subaortic stenosis, mitral regurgitation, paroxymal atrial fibrillation, hypertropic cardiomyopathy, glaucoma, CAD, ADHF, stroke, HTN, depression, musculoskeletal disorder, left carotid occlusion, and cerebral atrophy who presents from [facility] via EMS with chief complaint of leg swelling. Pt's daughter in law, granddaughter and  are bedside. Pt is a resident in and assisted living facility due to a previous stroke that has rendered her nonverbal. Pt's relatives states that when they went to see her today they noticed that her left leg was visibly swollen prompting them to come to the ED. Pt appears to wince upon palpation of left leg. Pt has h/o blood clots and takes coumadin to manage. Pt's granddaughter is unsure when the pt last had her coumadin levels checked. Pt was recently seen in the ED 1 week ago for a UTI and was prescribed antibiotics. Pt is taking the antibiotics as directed. Pt's relative state that the pt seems to be baseline for her. There are no other acute medical concerns at this time. Social hx: (-)smoker, (-)EtOH    PCP: Darell Canavan, MD    Full history, physical exam, and ROS unable to be obtained due to:  Aphasia secondary to stroke. Note written by Justin Glaser, as dictated by Natalie Baumgarten PA-C 2:20 PM        The history is provided by a relative.         Past Medical History:   Diagnosis Date    CAD (coronary artery disease)     Carotid occlusion, left 10/25/2016    Cerebral atrophy 10/25/2016    Dementia     Depression     Glaucoma     Hx of completed stroke     Hyperlipidemia LDL goal <70 10/25/2016    Hypertension     Hypertrophic cardiomyopathy ([facility] [address] 6/1/2012    Hypertrophic subaortic stenosis (idiopathic) (HCC)     Mitral regurgitation     Paroxysmal atrial fibrillation ([address] 11/29/2011       Past Surgical History:   Procedure Laterality Date    Cardiac catheterization  2004     Normal cors, EF 60%, significant MR. EDP 23    Echo 2d adult  8/20/2010     HCM, Resting LVOT gradient 129 mmHg, grade 3-4 diastolic dysfunction, MAC, mod-severe MR, marked LAE.  Echo 2d adult  5/27/11     mod-severe LVH, EF 65%, grade 3-4 diastolic dysfunction, LVOT resting gradient 42 mmHg, severe LAE, mod-severe MR, PA 46 mmHg    Pr cardiac surg procedure unlist           Family History:   Problem Relation Age of Onset    Other Other      patient specifically denies the following in 1st degree relative: HTN, DM, CVA, CAD, ca    Heart Disease Mother        Social History     Social History    Marital status:      Spouse name: N/A    Number of children: N/A    Years of education: N/A     Occupational History    Not on file. Social History Main Topics    Smoking status: Never Smoker    Smokeless tobacco: Never Used    Alcohol use No    Drug use: No    Sexual activity: Not on file     Other Topics Concern    Not on file     Social History Narrative         ALLERGIES: Review of patient's allergies indicates no known allergies. Review of Systems   Unable to perform ROS: Patient nonverbal       Vitals:    01/09/17 1930 01/09/17 1938 01/09/17 2000 01/09/17 2030   BP: 154/71  165/71 153/77   Pulse: 87 86 85 84   Resp: 21 22 23 21   Temp:       SpO2: 95% 96%     Weight:       Height:                Physical Exam   Constitutional: She appears well-developed and well-nourished. No distress. Elderly female    HENT:   Head: Normocephalic and atraumatic. Right Ear: External ear normal.   Left Ear: External ear normal.   Neck: Neck supple. Cardiovascular: Normal rate, regular rhythm, normal heart sounds and intact distal pulses. Exam reveals no gallop and no friction rub. No murmur heard. Pulmonary/Chest: Effort normal and breath sounds normal. No stridor. No respiratory distress. She has no wheezes. She has no rales. She exhibits no tenderness. Abdominal: Soft. Bowel sounds are normal. She exhibits no distension and no mass. There is no tenderness. There is no rebound and no guarding. Musculoskeletal: Normal range of motion. She exhibits edema and tenderness. She exhibits no deformity. Left leg with moderate swelling and TTP distal n/v intact. Cap refill brisk. Normothermic no discoloration. No deformity or lesions. Pt is non ambulatory   Neurological: She is alert. A cranial nerve deficit is present. Coordination normal.   Baseline non verbal and deficits from previous cva   Skin: No rash noted. No erythema. No pallor. Psychiatric: She has a normal mood and affect. Her behavior is normal.   Nursing note and vitals reviewed. MDM  Number of Diagnoses or Management Options  Acute deep vein thrombosis (DVT) of proximal vein of left lower extremity ([address]):   Chronic anticoagulation:   Urinary tract infection, site unspecified:      Amount and/or Complexity of Data Reviewed  Clinical lab tests: ordered and reviewed  Tests in the radiology section of CPT®: ordered and reviewed  Tests in the medicine section of CPT®: ordered and reviewed  Obtain history from someone other than the patient: yes (Daughter in law,  and grand daughter)  Review and summarize past medical records: yes  Independent visualization of images, tracings, or specimens: yes    Patient Progress  Patient progress: stable    ED Course       Procedures    2:20 PM  Discussed pt, sx, hx and current findings with Dr Short Members . He is in agreement with plan and will see pt  Yakelin Corrigan. SHAUNNA Amos    4:15 PM   Dr. Short Members recommends ct chest as pt has extensive clot to left leg and sats are 94 % RA  Yakelin Corrigan. SHAUNNA Amos    ED EKG interpretation: 5:22 PM  Rhythm: normal sinus rhythm and 1st degree block; and regular . Rate (approx.): 89; Axis: normal; P wave: normal; QRS interval: normal ; ST/T wave: normal; Other findings: lvh with repolarization abnormality. + abnormal EKG.  This EKG was interpreted by Dong Sánchez. Teresa Patel MD,ED Provider. 7:06 PM  Iggy Eaton. SHAUNNA Amos spoke with Dr. Hiram Fitch, Consult for Hospitalist. Discussed available diagnostic tests and clinical findings. He is in agreement with care plans as outlined. He will see pt  Guera Cope PA-C    LABORATORY TESTS:  Recent Results (from the past 12 hour(s))   CBC WITH AUTOMATED DIFF    Collection Time: 01/09/17  3:02 PM   Result Value Ref Range    WBC 11.0 3.6 - 11.0 K/uL    RBC 3.60 (L) 3.80 - 5.20 M/uL    HGB 11.4 (L) 11.5 - 16.0 g/dL    HCT 36.3 35.0 - 47.0 %    .8 (H) 80.0 - 99.0 FL    MCH 31.7 26.0 - 34.0 PG    MCHC 31.4 30.0 - 36.5 g/dL    RDW 12.7 11.5 - 14.5 %    PLATELET 692 360 - 058 K/uL    NEUTROPHILS 71 32 - 75 %    LYMPHOCYTES 19 12 - 49 %    MONOCYTES 8 5 - 13 %    EOSINOPHILS 1 0 - 7 %    BASOPHILS 1 0 - 1 %    ABS. NEUTROPHILS 7.8 1.8 - 8.0 K/UL    ABS. LYMPHOCYTES 2.1 0.8 - 3.5 K/UL    ABS. MONOCYTES 0.9 0.0 - 1.0 K/UL    ABS. EOSINOPHILS 0.1 0.0 - 0.4 K/UL    ABS.  BASOPHILS 0.1 0.0 - 0.1 K/UL    DF SMEAR SCANNED      RBC COMMENTS MACROCYTOSIS  1+       METABOLIC PANEL, BASIC    Collection Time: 01/09/17  3:02 PM   Result Value Ref Range    Sodium 141 136 - 145 mmol/L    Potassium 4.3 3.5 - 5.1 mmol/L    Chloride 103 97 - 108 mmol/L    CO2 29 21 - 32 mmol/L    Anion gap 9 5 - 15 mmol/L    Glucose 113 (H) 65 - 100 mg/dL    BUN 25 (H) 6 - 20 MG/DL    Creatinine 1.17 (H) 0.55 - 1.02 MG/DL    BUN/Creatinine ratio 21 (H) 12 - 20      GFR est AA 55 (L) >60 ml/min/1.73m2    GFR est non-AA 45 (L) >60 ml/min/1.73m2    Calcium 8.7 8.5 - 10.1 MG/DL   PROTHROMBIN TIME + INR    Collection Time: 01/09/17  3:02 PM   Result Value Ref Range    INR 1.3 (H) 0.9 - 1.1      Prothrombin time 12.7 (H) 9.0 - 11.1 sec   EKG, 12 LEAD, INITIAL    Collection Time: 01/09/17  5:22 PM   Result Value Ref Range    Ventricular Rate 89 BPM    Atrial Rate 89 BPM    P-R Interval 246 ms    QRS Duration 108 ms    Q-T Interval 400 ms QTC Calculation (Bezet) 486 ms    Calculated P Axis 105 degrees    Calculated R Axis 21 degrees    Calculated T Axis 174 degrees    Diagnosis       Sinus rhythm with 1st degree AV block  Left ventricular hypertrophy with repolarization abnormality  Abnormal ECG  When compared with ECG of 03-JAN-2017 19:47,  ME interval has increased  ST no longer depressed in Anterior leads     URINALYSIS W/ REFLEX CULTURE    Collection Time: 01/09/17  5:40 PM   Result Value Ref Range    Color YELLOW/STRAW      Appearance CLOUDY (A) CLEAR      Specific gravity 1.028 1.003 - 1.030      pH (UA) 5.0 5.0 - 8.0      Protein NEGATIVE  NEG mg/dL    Glucose NEGATIVE  NEG mg/dL    Ketone NEGATIVE  NEG mg/dL    Bilirubin NEGATIVE  NEG      Blood MODERATE (A) NEG      Urobilinogen 0.2 0.2 - 1.0 EU/dL    Nitrites NEGATIVE  NEG      Leukocyte Esterase MODERATE (A) NEG      WBC 20-50 0 - 4 /hpf    RBC 10-20 0 - 5 /hpf    Epithelial cells FEW FEW /lpf    Bacteria NEGATIVE  NEG /hpf    UA:UC IF INDICATED URINE CULTURE ORDERED (A) CNI         IMAGING RESULTS:     Study Result      Indication: Profound right-sided weakness and inattention. Previous venous  Doppler yesterday's positive for DVT.     Exam: CT PA chest.     Comparisons: January 3, 2017     Technique: 2.5 mm axial images were obtained from the bases to the lung apices  after the intravenous administration of 70 cc of Isovue-370. Three-dimensional  postprocessing was performed by the technologist with MIP reconstructions. CT  dose reduction was achieved through use of a standardized protocol tailored for  this examination and automatic exposure control for dose modulation.      Findings: There is no evidence of acute pulmonary embolus. There are no  pathologically enlarged mediastinal, hilar or axillary nodes. Heart is enlarged. There is left ventricular hypertrophy. Aorta is not dilated. Limited evaluation  of the upper abdominal structures is normal. No focal consolidation.  No pleural  effusion. There is a 2 mm noncalcified pulmonary nodule right middle lobe. This  is unchanged since 2016. No acute bony abnormality. Patient is scoliotic     IMPRESSION  Impression:  1. No acute cardiopulmonary disease, no acute pulmonary embolus   2. Enlarged heart  3. 2 mm nodule right middle lobe unchanged     RECOMMENDATIONS FOR FOLLOW-UP AND MANAGEMENT OF NODULES SMALLER THAN 8 MM  DETECTED INCIDENTALLY AT NONSCREENING CT:     LOW-RISK PATIENTS:     LESS THAN OR EQUAL TO 4 MM: No follow-up needed.     HIGH-RISK PATIENTS:     LESS THAN OR EQUAL TO 4 MM: Follow-up CT at 12 months; if unchanged, no further  follow-up. Guidelines for Management of Small Pulmonary Nodules Detected on CT Scans: A  Statement from the [employer]\" [employer] 2005;  [id number]              Transcription      Type ID     Vascular Result TRK     Draft copy - this document is not available for patient care     Document Text        []Hide copied text  []Hover for attribution information     [address]  ** PRELIMINARY REPORT **     Name: Regina Gómez  MRN: [id number]  : 26 Aug 1940  HIS Order #: [id number]  [id number] [facility] Visit #: [id number]  Date: 2017     TYPE OF TEST: Peripheral Venous Testing     REASON FOR TEST  Limb swelling     Left Leg:-  Deep venous thrombosis: Yes  Proximal extent of thrombus: External Iliac  Superficial venous thrombosis: Yes  Deep venous insufficiency: No  Superficial venous insufficiency: No        INTERPRETATION/FINDINGS  PROCEDURE: LEFT LOWER EXTREMITY VENOUS DUPLEX . Evaluation of lower  extremity veins with ultrasound (B-mode imaging, pulsed Doppler, color  Doppler). Includes the common femoral, deep femoral, femoral,  popliteal, posterior tibial, peroneal, and great saphenous veins.   Other veins, for example the gastrocnemius and soleal veins, may also  be visualized.     FINDINGS: In the left lower extremity occlusive thrombus was seen in  the external iliac extending distally through the femoral segment,  popliteal and into both paired peroneal and 1 of 2 posterior tibial  veins. Thrombus was also noted in the saphenofemoral junction and  proximal to mid great saphenous vein. Gray scale and color duplex  imaging demonstrates compressibility, and augmented flow profiles in  the remaining veins.     CONCLUSION: Left lower extremity venous duplex positive for acute deep  venous thrombosis and thrombophlebitis throughout the left leg from  external iliac vein down through the calf. Right common femoral vein  is thrombus free.     ADDITIONAL COMMENTS     I have personally reviewed the data relevant to the interpretation of  this study.     TECHNOLOGIST: Desi Posadas  Signed: 01/09/2017 04:29 PM                  MEDICATIONS GIVEN:  Medications   naloxone (NARCAN) injection 0.4 mg (not administered)   0.9% sodium chloride infusion (150 mL/hr IntraVENous New Bag 1/9/17 1951)   acetaminophen (TYLENOL) tablet 650 mg (not administered)   HYDROcodone-acetaminophen (NORCO) 5-325 mg per tablet 1 Tab (not administered)   diphenhydrAMINE (BENADRYL) capsule 25 mg (not administered)   ondansetron (ZOFRAN) injection 4 mg (not administered)   enoxaparin (LOVENOX) injection 60 mg (not administered)   warfarin (COUMADIN) tablet 5 mg (not administered)   Warfarin- Pharmacy to dose daily (not administered)   sodium chloride 0.9 % bolus infusion 500 mL (0 mL IntraVENous IV Completed 1/9/17 1855)   iopamidol (ISOVUE-370) 76 % injection 100 mL (70 mL IntraVENous Given 1/9/17 1655)   enoxaparin (LOVENOX) injection 60 mg (60 mg SubCUTAneous Given 1/9/17 1950)       IMPRESSION:  1. Acute deep vein thrombosis (DVT) of proximal vein of left lower extremity ([address]    2. Chronic anticoagulation    3. Urinary tract infection, site unspecified        PLAN:  1. Admit to the hospital    7:07 PM   Pt is being admitted to the hospital by Dr. Crow Vuong.  All available results and reasons for admission have been discussed with pt and/or available family. Pt and/or family convey agreement and understanding of need for admission and of admission diagnosis.

## 2017-01-09 NOTE — PROGRESS NOTES
Left LE venous duplex completed. Verbal results were given to ANUJA Amos at 1615. Final results to follow.

## 2017-01-09 NOTE — ED NOTES
Pt perineal area cleaned with bath wipes. Before pt could be cathed for urine, pt began to pass a large amount of urine in half removed incontinence garment. Urine did pool and a sample was collected as she was voiding. Patient cleaned, linen and undergarment changed, repositioned in bed for comfort.

## 2017-01-09 NOTE — IP AVS SNAPSHOT
[address] [place] [address] 104 1007 [facility] 
[phone] Patient: Aida Shore MRN: [id number] [facility]:[birth year] You are allergic to the following No active allergies Recent Documentation Height Weight Breastfeeding? BMI OB Status Smoking Status 1.626 m 61.7 kg No 23.34 kg/m2 Postmenopausal Never Smoker Emergency Contacts Name Discharge Info Relation Home Work Mobile Tylor Damian DISCHARGE CAREGIVER [3] Spouse [3] [phone] Nirmal Borrero  Other Relative [6] [phone] RickieCallie DISCHARGE CAREGIVER [3] Daughter [21] [phone] About your hospitalization You were admitted on:  January 9, 2017 You last received care in the:  [facility] 5 MED SURG 1 You were discharged on:  January 22, 2017 Unit phone number:  [phone] Why you were hospitalized Your primary diagnosis was:  Not on File Your diagnoses also included:  Deep Venous Thrombosis (Hcc), Dehydration, Arf (Acute Renal Failure) (Hcc), Weakness Due To Cerebrovascular Accident (Cva) (Hcc), Paroxysmal Atrial Fibrillation (Hcc), Hypertrophic Cardiomyopathy (Hcc), Hyperlipidemia Ldl Goal <70, Goals Of Care, Counseling/Discussion, Glaucoma, Dementia, Chronic Diastolic Heart Failure (Hcc), Cerebral Atrophy, Cad (Coronary Artery Disease) Providers Seen During Your Hospitalizations Provider Role Specialty Primary office phone Caitlin Escobedo MD Attending Provider Emergency Medicine [phone] Suzan Segovia MD Attending Provider Internal Medicine [phone] Ariana Lezama MD Attending Provider [facility] [phone] Your Primary Care Physician (PCP) Primary Care Physician Office Phone Office Fax Elwyn Runner [phone] Follow-up Information Follow up With Details Comments Contact Info Dg Wilson MD   [address] [address] 
[phone] Dg Wilson MD In 1 week  [address] [address] 
[phone] Current Discharge Medication List  
  
CONTINUE these medications which have CHANGED Dose & Instructions Dispensing Information Comments Morning Noon Evening Bedtime QUEtiapine 25 mg tablet Commonly known as:  SEROquel What changed:   
- when to take this - Another medication with the same name was removed. Continue taking this medication, and follow the directions you see here. Your next dose is: Today, Tomorrow Other:  _________ Dose:  12.5 mg Take 0.5 Tabs by mouth nightly. Indications: post CVA and dementia behavioral disorder Quantity:  30 Tab Refills:  0 CONTINUE these medications which have NOT CHANGED Dose & Instructions Dispensing Information Comments Morning Noon Evening Bedtime ALPHAGAN P 0.1 % ophthalmic solution Generic drug:  brimonidine Your next dose is: Today, Tomorrow Other:  _________ Dose:  1 Drop Administer 1 Drop to right eye two (2) times a day. Refills:  0  
     
   
   
   
  
 bimatoprost 0.01 % ophthalmic drops Commonly known as:  LUMIGAN Your next dose is: Today, Tomorrow Other:  _________ Dose:  1 Drop Administer 1 Drop to right eye nightly. Refills:  0  
     
   
   
   
  
 bumetanide 0.5 mg tablet Commonly known as:  Ledon Lights Your next dose is: Today, Tomorrow Other:  _________ Dose:  0.5 mg Take 0.5 mg by mouth three (3) days a week. Patient takes on Monday, Wednesday, Friday Refills:  0  
     
   
   
   
  
 disopyramide phosphate 100 mg capsule Commonly known as:  Madelon Brighter Your next dose is: Today, Tomorrow Other:  _________  
   
   
 take 1 capsule by mouth twice a day Quantity:  60 Cap Refills:  3  
     
   
   
   
  
 dorzolamide-timolol 22.3-6.8 mg/mL ophthalmic solution Commonly known as:  COSOPT Your next dose is: Today, Tomorrow Other:  _________ Dose:  1 Drop Administer 1 Drop to right eye two (2) times a day. Refills:  0  
     
   
   
   
  
 memantine ER 28 mg capsule Commonly known as:  NAMENDA XR Your next dose is: Today, Tomorrow Other:  _________ Dose:  28 mg Take 1 Cap by mouth daily. Quantity:  30 Cap Refills:  3  
     
   
   
   
  
 metoprolol tartrate 25 mg tablet Commonly known as:  LOPRESSOR Your next dose is: Today, Tomorrow Other:  _________  
   
   
 take 3 tablets by mouth twice a day Quantity:  180 Tab Refills:  5  
     
   
   
   
  
 potassium chloride 20 mEq tablet Commonly known as:  K-DUR, KLOR-CON Your next dose is: Today, Tomorrow Other:  _________ Dose:  20 mEq Take 20 mEq by mouth daily. Refills:  0  
     
   
   
   
  
 * warfarin 4 mg tablet Commonly known as:  COUMADIN Your next dose is: Today, Tomorrow Other:  _________ Dose:  5 mg Take 5 mg by mouth five (5) days a week. Monday, Tuesday, Wednesday, Thursday, Friday at 5pm  
 Refills:  0  
     
   
   
   
  
 * COUMADIN 6 mg tablet Generic drug:  warfarin Your next dose is: Today, Tomorrow Other:  _________ Dose:  6 mg Take 6 mg by mouth two (2) times a week. Saturday, Sunday at 5pm  
 Refills:  0  
     
   
   
   
  
 * Notice: This list has 2 medication(s) that are the same as other medications prescribed for you. Read the directions carefully, and ask your doctor or other care provider to review them with you. STOP taking these medications   
 atorvastatin 40 mg tablet Commonly known as:  LIPITOR  
   
  
 cephALEXin 500 mg capsule Commonly known as:  Dellia Cons Where to Get Your Medications Information on where to get these meds will be given to you by the nurse or doctor. ! Ask your nurse or doctor about these medications QUEtiapine 25 mg tablet Discharge Instructions Patient Discharge Instructions Daryl Morris / [id number] : 1940 Admitted 2017 Discharged: 2017 9:15 AM  
 
ACUTE DIAGNOSES: 
Deep venous thrombosis ([facility] CHRONIC MEDICAL DIAGNOSES: 
Problem List as of 2017  Date Reviewed: 2017 Codes Class Noted - Resolved Deep venous thrombosis (HCC) ICD-10-CM: I82.409 ICD-9-CM: 453.40  2017 - Present CAD (coronary artery disease) ICD-10-CM: I25.10 ICD-9-CM: 414.00  Unknown - Present Dehydration ICD-10-CM: E86.0 ICD-9-CM: 276.51  2017 - Present ARF (acute renal failure) ([facility]) ICD-10-CM: N17.9 ICD-9-CM: 584.9  2017 - Present Weakness due to cerebrovascular accident (CVA) ([facility] ICD-10-CM: I63.9, R53.1 ICD-9-CM: 434.91, 780.79  10/26/2016 - Present Goals of care, counseling/discussion ICD-10-CM: Z71.89 ICD-9-CM: V65.49  10/26/2016 - Present Hyperlipidemia LDL goal <70 ICD-10-CM: E78.5 ICD-9-CM: 272.4  10/25/2016 - Present Cerebral atrophy ICD-10-CM: G31.9 ICD-9-CM: 331.9  10/25/2016 - Present Dementia ICD-10-CM: F03.90 ICD-9-CM: 294.20  10/25/2016 - Present Glaucoma ICD-10-CM: H40.9 ICD-9-CM: 365.9  2014 - Present Hypertrophic cardiomyopathy (HCC) ICD-10-CM: I42.2 ICD-9-CM: 425.18  2012 - Present Paroxysmal atrial fibrillation (HCC) ICD-10-CM: I48.0 ICD-9-CM: 427.31  2011 - Present Chronic diastolic heart failure ([address] ICD-10-CM: I50.32 
ICD-9-CM: 428.32  2011 - Present RESOLVED: Carotid occlusion, left ICD-10-CM: A73.07 
ICD-9-CM: 433.10  10/25/2016 - 2017 RESOLVED: Acute CVA (cerebrovascular accident) ([facility] ICD-10-CM: I63.9 ICD-9-CM: 434.91  10/24/2016 - 2017 RESOLVED: Orthostatic hypotension ICD-10-CM: I95.1 ICD-9-CM: 458.0  10/28/2015 - 1/9/2017 RESOLVED: Acute embolic stroke ([facility] HYG-44-XW: I63.9 ICD-9-CM: 434.11  6/26/2015 - 1/9/2017 RESOLVED: Dyslipidemia ICD-10-CM: E78.5 ICD-9-CM: 272.4  6/25/2015 - 1/9/2017 RESOLVED: Pulmonary nodule ICD-10-CM: R91.1 ICD-9-CM: 793.11  6/25/2015 - 1/9/2017 RESOLVED: Receptive aphasia ICD-10-CM: R47.01 
ICD-9-CM: 784.3  6/25/2015 - 8/23/2015 RESOLVED: Dysarthria ICD-10-CM: R47.1 ICD-9-CM: 784.51  6/25/2015 - 8/23/2015 RESOLVED: Expressive aphasia ICD-10-CM: R47.01 
ICD-9-CM: 784.3  6/25/2015 - 8/23/2015 RESOLVED: ADHF (acute decompensated heart failure) (HCC) ICD-10-CM: I50.9 ICD-9-CM: 428.0  6/24/2015 - 8/23/2015 RESOLVED: Cerebral ventriculomegaly due to brain atrophy ICD-10-CM: G31.9 ICD-9-CM: 331.9  6/24/2015 - 8/23/2015 RESOLVED: Left-sided visual neglect ICD-10-CM: R41.4 ICD-9-CM: 781.8  6/24/2015 - 8/23/2015 RESOLVED: Chest pain ICD-10-CM: R07.9 ICD-9-CM: 786.50  6/23/2015 - 8/23/2015 RESOLVED: RYAN (dyspnea on exertion) ICD-10-CM: R06.09 
ICD-9-CM: 786.09  5/22/2015 - 8/23/2015 RESOLVED: Chest tightness ICD-10-CM: R07.89 ICD-9-CM: 786.59  5/22/2015 - 8/23/2015 RESOLVED: Anemia ICD-10-CM: D64.9 ICD-9-CM: 285.9  1/29/2014 - 1/9/2017 RESOLVED: Supratherapeutic INR ICD-10-CM: R79.1 ICD-9-CM: 790.92  1/13/2014 - 1/29/2014 RESOLVED: Melena ICD-10-CM: K92.1 ICD-9-CM: 578.1  1/13/2014 - 1/29/2014 RESOLVED: Macrocytic anemia ICD-10-CM: D53.9 ICD-9-CM: 281.9  1/13/2014 - 1/29/2014 RESOLVED: GI bleed ICD-10-CM: K92.2 ICD-9-CM: 578.9  1/13/2014 - 1/29/2014 RESOLVED: Cough ICD-10-CM: R05 ICD-9-CM: 786.2  1/13/2014 - 1/29/2014 RESOLVED: Diarrhea ICD-10-CM: R19.7 ICD-9-CM: 787.91  1/13/2014 - 1/29/2014 RESOLVED: Coffee ground emesis ICD-10-CM: K92.0 ICD-9-CM: 578.0  1/13/2014 - 1/29/2014 RESOLVED: Left flank pain ICD-10-CM: R10.9 ICD-9-CM: 789.09  1/13/2014 - 1/29/2014 RESOLVED: Weakness generalized ICD-10-CM: R53.1 ICD-9-CM: 780.79  1/13/2014 - 8/23/2015 RESOLVED: Hypoxia ICD-10-CM: R09.02 
ICD-9-CM: 799.02  1/13/2014 - 1/29/2014 RESOLVED: CAP (community acquired pneumonia) ICD-10-CM: J18.9 ICD-9-CM: 889  1/13/2014 - 1/29/2014 RESOLVED: Pericardial effusion ICD-10-CM: I31.3 ICD-9-CM: 423.9  1/13/2014 - 1/29/2014 RESOLVED: Elevated blood pressure reading without diagnosis of hypertension ICD-10-CM: R03.0 ICD-9-CM: 796.2  1/13/2014 - 1/29/2014 RESOLVED: Mitral regurgitation ICD-10-CM: I34.0 ICD-9-CM: 424.0  10/22/2010 - 1/9/2017 DISCHARGE MEDICATIONS:  
 
 
 
· It is important that you take the medication exactly as they are prescribed. · Keep your medication in the bottles provided by the pharmacist and keep a list of the medication names, dosages, and times to be taken in your wallet. · Do not take other medications without consulting your doctor. DIET:  Cardiac Diet ACTIVITY: Activity as tolerated ADDITIONAL INFORMATION: If you experience any of the following symptoms then please call your primary care physician or return to the emergency room if you cannot get hold of your doctor: Fever, chills, nausea, vomiting, diarrhea, change in mentation, falling, bleeding, shortness of breath. FOLLOW UP CARE: 
Dr. [address], [place]  you are to call and set up an appointment to see them with in 1 week. Follow-up with specialists at directed by them Information obtained by : 
I understand that if any problems occur once I am at home I am to contact my physician. I understand and acknowledge receipt of the instructions indicated above. Physician's or R.N.'s Signature                                                                  Date/Time Patient or Representative Signature                                                          Date/Time Discharge Orders None [facility] Announcement We are excited to announce that we are making your provider's discharge notes available to you in [employer]. You will see these notes when they are completed and signed by the physician that discharged you from your recent hospital stay. If you have any questions or concerns about any information you see in [employer], please call the Health Information Department where you were seen or reach out to your Primary Care Provider for more information about your plan of care. Introducing [facility]! Dear Giovany Beckett: Thank you for requesting a [employer] account. Our records indicate that you already have an active [employer] account. You can access your account anytime at [email or web address] Did you know that you can access your hospital and ER discharge instructions at any time in [employer]? You can also review all of your test results from your hospital stay or ER visit. Additional Information If you have questions, please visit the Frequently Asked Questions section of the [employer] website at [email or web address] Remember, [employer] is NOT to be used for urgent needs. For medical emergencies, dial 911. Now available from your iPhone and Android! General Information Please provide this summary of care documentation to your next provider. Patient Signature:  ____________________________________________________________ Date:  ____________________________________________________________  
  
Romeo Sebastian  Provider Signature: ____________________________________________________________ Date:  ____________________________________________________________

## 2017-01-09 NOTE — TELEPHONE ENCOUNTER
Spoke with patient's granddaughter. She states patient had a doppler last night and has a DVT. The veena are managing this and has not sent her to the ED. Notes and report requested for physician review. The shakira's contact number: [phone]. Nirmal's contact number: [phone].

## 2017-01-09 NOTE — Clinical Note
Status[de-identified] Inpatient [101] Type of Bed: Telemetry Remote [29] Inpatient Hospitalization Certified Necessary for the Following Reasons: 3. Patient receiving treatment that can only be provided in an inpatient setting (further clarification in H&P documentation) Admitting Diagnosis: Deep venous thrombosis ([facility] [[id number]] [facility] Rose Attending Physician: [address] [place], [place] [address]Enoc Estimated Length of Stay: > or = to 2 Midnights Discharge Plan[de-identified] Home with Office Follow-up

## 2017-01-09 NOTE — ED NOTES
Pt has profound right side weakness and inattention. No respiratory distress. Makes head motion and verbal cues that indicate no pain. Will also do nonverbal scale. Per notes from the [address], transport if per family request. None currently at bedside. Will place on cardiac monitor and draw blood if has not been seen by MD. Pt currently taking coumadin. Unknown last INR check.

## 2017-01-10 LAB
ATRIAL RATE: 89 BPM
CALCULATED P AXIS, ECG09: 105 DEGREES
CALCULATED R AXIS, ECG10: 21 DEGREES
CALCULATED T AXIS, ECG11: 174 DEGREES
DIAGNOSIS, 93000: NORMAL
GLUCOSE BLD STRIP.AUTO-MCNC: 119 MG/DL (ref 65–100)
GLUCOSE BLD STRIP.AUTO-MCNC: 140 MG/DL (ref 65–100)
GLUCOSE BLD STRIP.AUTO-MCNC: 173 MG/DL (ref 65–100)
GLUCOSE BLD STRIP.AUTO-MCNC: 86 MG/DL (ref 65–100)
INR PPP: 1.3 (ref 0.9–1.1)
P-R INTERVAL, ECG05: 246 MS
PROTHROMBIN TIME: 13.6 SEC (ref 9–11.1)
Q-T INTERVAL, ECG07: 400 MS
QRS DURATION, ECG06: 108 MS
QTC CALCULATION (BEZET), ECG08: 486 MS
SERVICE CMNT-IMP: ABNORMAL
SERVICE CMNT-IMP: NORMAL
VENTRICULAR RATE, ECG03: 89 BPM

## 2017-01-10 PROCEDURE — 82962 GLUCOSE BLOOD TEST: CPT

## 2017-01-10 PROCEDURE — 36415 COLL VENOUS BLD VENIPUNCTURE: CPT | Performed by: INTERNAL MEDICINE

## 2017-01-10 PROCEDURE — 74011250636 HC RX REV CODE- 250/636: Performed by: INTERNAL MEDICINE

## 2017-01-10 PROCEDURE — 74011000250 HC RX REV CODE- 250: Performed by: INTERNAL MEDICINE

## 2017-01-10 PROCEDURE — 65660000000 HC RM CCU STEPDOWN

## 2017-01-10 PROCEDURE — 74011250637 HC RX REV CODE- 250/637: Performed by: INTERNAL MEDICINE

## 2017-01-10 PROCEDURE — 85610 PROTHROMBIN TIME: CPT | Performed by: INTERNAL MEDICINE

## 2017-01-10 PROCEDURE — 76450000000

## 2017-01-10 RX ORDER — WARFARIN SODIUM 5 MG/1
5 TABLET ORAL ONCE
Status: COMPLETED | OUTPATIENT
Start: 2017-01-10 | End: 2017-01-10

## 2017-01-10 RX ADMIN — DORZOLAMIDE HYDROCHLORIDE AND TIMOLOL MALEATE 1 DROP: 20; 5 SOLUTION/ DROPS OPHTHALMIC at 22:31

## 2017-01-10 RX ADMIN — HYDROCODONE BITARTRATE AND ACETAMINOPHEN 1 TABLET: 5; 325 TABLET ORAL at 19:41

## 2017-01-10 RX ADMIN — BIMATOPROST 1 DROP: 0.1 SOLUTION/ DROPS OPHTHALMIC at 22:28

## 2017-01-10 RX ADMIN — DISOPYRAMIDE PHOSPHATE 100 MG: 100 CAPSULE ORAL at 22:32

## 2017-01-10 RX ADMIN — SODIUM CHLORIDE 150 ML/HR: 900 INJECTION, SOLUTION INTRAVENOUS at 10:10

## 2017-01-10 RX ADMIN — WARFARIN SODIUM 5 MG: 5 TABLET ORAL at 17:13

## 2017-01-10 RX ADMIN — ENOXAPARIN SODIUM 60 MG: 60 INJECTION SUBCUTANEOUS at 10:09

## 2017-01-10 RX ADMIN — BRIMONIDINE TARTRATE 1 DROP: 1 SOLUTION/ DROPS OPHTHALMIC at 22:30

## 2017-01-10 RX ADMIN — SODIUM CHLORIDE 150 ML/HR: 900 INJECTION, SOLUTION INTRAVENOUS at 03:20

## 2017-01-10 RX ADMIN — ENOXAPARIN SODIUM 60 MG: 60 INJECTION SUBCUTANEOUS at 19:42

## 2017-01-10 RX ADMIN — DORZOLAMIDE HYDROCHLORIDE AND TIMOLOL MALEATE 1 DROP: 20; 5 SOLUTION/ DROPS OPHTHALMIC at 00:34

## 2017-01-10 RX ADMIN — DORZOLAMIDE HYDROCHLORIDE AND TIMOLOL MALEATE 1 DROP: 20; 5 SOLUTION/ DROPS OPHTHALMIC at 10:09

## 2017-01-10 RX ADMIN — BRIMONIDINE TARTRATE 1 DROP: 1 SOLUTION/ DROPS OPHTHALMIC at 10:09

## 2017-01-10 RX ADMIN — SODIUM CHLORIDE 150 ML/HR: 900 INJECTION, SOLUTION INTRAVENOUS at 22:35

## 2017-01-10 RX ADMIN — METOPROLOL TARTRATE 75 MG: 25 TABLET ORAL at 22:28

## 2017-01-10 RX ADMIN — QUETIAPINE FUMARATE 12.5 MG: 25 TABLET, FILM COATED ORAL at 22:28

## 2017-01-10 RX ADMIN — MEMANTINE HYDROCHLORIDE 28 MG: 28 CAPSULE, EXTENDED RELEASE ORAL at 10:09

## 2017-01-10 RX ADMIN — DISOPYRAMIDE PHOSPHATE 100 MG: 100 CAPSULE ORAL at 11:24

## 2017-01-10 RX ADMIN — SODIUM CHLORIDE 150 ML/HR: 900 INJECTION, SOLUTION INTRAVENOUS at 17:10

## 2017-01-10 NOTE — PROGRESS NOTES
Visited patient as part of Palliative Medicine team, (Dr. Leonela Fitch and Leyla). No family present and patient was unable to engage Dr. Duke Contreras in meaningful conversation. Chaplains will follow as needed.    Chaplain Juanita, MDiv, MS, [facility]  [phone] ([phone])

## 2017-01-10 NOTE — PROGRESS NOTES
[facility] Dosing Services: 1/9/17    Consult for Warfarin Dosing by Pharmacy by Dr. Dr Hernández Solid provided for this 68 y.o.  female , for indication of Venous Thrombosis, left leg  Day of Therapy: continued from home  Dose to achieve an INR goal of 2-3    Order entered for  Warfarin  5(mg) ordered to be given today at 2000hrs. Significant drug interactions: Pt also on Lovenox 60mg sq q12h until INR Therapeutic  Previous dose given Home regimen: 6mg given on 1/8/17. Patient takes 6mg qSat/Sun and 5mg qMon/Tues/Weds/Thurs/Fri  -   Note: patient came from [address] [place] [address] at [facility]. Can't explain low INR? PT/INR Lab Results   Component Value Date/Time    INR 1.3 01/09/2017 03:02 PM    INR (POC) 1.0 10/24/2016 06:33 PM    INR POC 2.2 05/28/2015 01:01 PM      Platelets Lab Results   Component Value Date/Time    PLATELET 676 67/03/0063 03:02 PM      H/H Lab Results   Component Value Date/Time    HGB 11.4 01/09/2017 03:02 PM        Pharmacy to follow daily and will provide subsequent Warfarin dosing based on clinical status.   Radha Penn, [address])  Contact information [phone]

## 2017-01-10 NOTE — PROGRESS NOTES
[facility]: MED RECONCILIATION    Comments/Recommendations:   Drug Drug interaction noted between disopyramide and quetiapine - Concurrent administration may result in prolongation of the QTc interval   Medications added:     · None    Medications removed:    · None    Medications adjusted:    · Potassium chloride changed from 20meq bid to 20meq daily  · Alphagan changed from 0.2% to Alphagan P 0.1%  · Seroquel changed from 12.5mg bid to 12.5mg qhs    Information obtained from: Transfer paperwork from [facility], called facility to confirm last doses    Significant PMH/Disease States:   Patient Active Problem List   Diagnosis Code    Chronic diastolic heart failure (HCC) I50.32    Paroxysmal atrial fibrillation (HCC) I48.0    Hypertrophic cardiomyopathy (HCC) I42.2    Glaucoma H40.9    Hyperlipidemia LDL goal <70 E78.5    Cerebral atrophy G31.9    Dementia F03.90    Weakness due to cerebrovascular accident (CVA) ([address] I63.9, R53.1    Goals of care, counseling/discussion Z71.89    Deep venous thrombosis ([facility] I82.409    CAD (coronary artery disease) I25.10    Dehydration E86.0    ARF (acute renal failure) ([facility]) N17.9     Past Medical History   Diagnosis Date    CAD (coronary artery disease)     Carotid occlusion, left 10/25/2016    Cerebral atrophy 10/25/2016    Dementia     Depression     Glaucoma     Hx of completed stroke     Hyperlipidemia LDL goal <70 10/25/2016    Hypertension     Hypertrophic cardiomyopathy ([facility] 6/1/2012    Hypertrophic subaortic stenosis (idiopathic) ([facility])     Mitral regurgitation     Paroxysmal atrial fibrillation ([facility] 11/29/2011     Chief Complaint for this Admission:   Chief Complaint   Patient presents with    Blood Clot     Allergies: Review of patient's allergies indicates no known allergies. Prior to Admission Medications:     Prior to Admission Medications   Prescriptions Last Dose Informant Patient Reported? Taking?    QUEtiapine (SEROQUEL) 25 mg tablet 1/8/2017 at Unknown time Transfer Papers Yes Yes   Sig: Take 12.5 mg by mouth nightly. atorvastatin (LIPITOR) 40 mg tablet 1/8/2017 at Unknown time Transfer Papers No Yes   Sig: Take 1 Tab by mouth nightly. bimatoprost (LUMIGAN) 0.01 % ophthalmic drops 1/8/2017 at Unknown time Transfer Papers Yes Yes   Sig: Administer 1 Drop to right eye nightly. brimonidine (ALPHAGAN P) 0.1 % ophthalmic solution 1/9/2017 at am Transfer Papers Yes Yes   Sig: Administer 1 Drop to right eye two (2) times a day. bumetanide (BUMEX) 0.5 mg tablet 1/9/2017 at am Transfer Papers Yes Yes   Sig: Take 0.5 mg by mouth three (3) days a week. Patient takes on Monday, Wednesday, Friday   cephALEXin (KEFLEX) 500 mg capsule 1/9/2017 at am Transfer Papers No Yes   Sig: Take 1 Cap by mouth two (2) times a day for 7 days. disopyramide phosphate (NORPACE) 100 mg capsule 1/9/2017 at am Transfer Papers No Yes   Sig: take 1 capsule by mouth twice a day   dorzolamide-timolol (COSOPT) 2-0.5 % ophthalmic solution 1/9/2017 at am Transfer Papers Yes Yes   Sig: Administer 1 Drop to right eye two (2) times a day. memantine ER (NAMENDA XR) 28 mg capsule 1/9/2017 at am Transfer Papers No Yes   Sig: Take 1 Cap by mouth daily. metoprolol tartrate (LOPRESSOR) 25 mg tablet 1/9/2017 at am Transfer Papers No Yes   Sig: take 3 tablets by mouth twice a day   potassium chloride (K-DUR, KLOR-CON) 20 mEq tablet 1/9/2017 at am Transfer Papers Yes Yes   Sig: Take 20 mEq by mouth daily. warfarin (COUMADIN) 4 mg tablet 1/6/2017 Transfer Papers Yes Yes   Sig: Take 5 mg by mouth five (5) days a week. Monday, Tuesday, Wednesday, Thursday, Friday at 5pm   warfarin (COUMADIN) 6 mg tablet 1/8/2017 at Unknown time Transfer Papers Yes Yes   Sig: Take 6 mg by mouth two (2) times a week.  Saturday, Sunday at 5pm      Facility-Administered Medications: None      Thank you,    Elena Montiel, PharmD, BCPS

## 2017-01-10 NOTE — PROGRESS NOTES
Daily Progress Note: 1/10/2017  Sonido Yepez MD    Assessment/Plan:   Deep venous thrombosis - New acute and extensive. Start empiric lovenox BID. Discuss options and goals with family. Prior admission confirms DNR status. Previously on coumadin, resume that, lovenox bridge.     ARF (acute renal failure) / Dehydration - POA, likely due to poor PO intake. Hydrate. Hold PO K and hold Bumex     Hypertrophic cardiomyopathy / Chronic diastolic heart failure - Monitor with symptoms, given need for hydration. Hold bumex, continue metoprolol, home norpace     Dementia / Cerebral atrophy - Severe. Previously required sitter. Continue seroquel and memantine.     Paroxysmal atrial fibrillation / CAD (coronary artery disease) - No acute symptoms. Continue BB. Not on ASA. Hold statin.     Glaucoma - Continue lumigan, alphagan and cospot     Hyperlipidemia LDL goal <70 - Given age and co morbidities, I would not test or treat hyperlipidemia     Weakness due to cerebrovascular accident - Fall precautions. Pureed and nector diet. She is at baseline, making no improvement since stroke. Family wants out of SNF, and into LTC      Goals of care, counseling/discussion - Consult palliative care. May consider comfort goals.     Recent UTI - Had completed keflex. Current UA unremarkable.  Monitor.           Problem List:  Problem List as of 1/10/2017  Date Reviewed: 1/9/2017          Codes Class Noted - Resolved    Deep venous thrombosis ([facility] ICD-10-CM: I82.409  ICD-9-CM: 453.40  1/9/2017 - Present        CAD (coronary artery disease) ICD-10-CM: I25.10  ICD-9-CM: 414.00  Unknown - Present        Dehydration ICD-10-CM: E86.0  ICD-9-CM: 276.51  1/9/2017 - Present        ARF (acute renal failure) ([facility] ICD-10-CM: N17.9  ICD-9-CM: 584.9  1/9/2017 - Present        Weakness due to cerebrovascular accident (CVA) ([facility] ICD-10-CM: I63.9, R53.1  ICD-9-CM: 434.91, 780.79  10/26/2016 - Present        Goals of care, counseling/discussion ICD-10-CM: Z71.89  ICD-9-CM: V65.49  10/26/2016 - Present        Hyperlipidemia LDL goal <70 ICD-10-CM: E78.5  ICD-9-CM: 272.4  10/25/2016 - Present        Cerebral atrophy ICD-10-CM: G31.9  ICD-9-CM: 331.9  10/25/2016 - Present        Dementia ICD-10-CM: F03.90  ICD-9-CM: 294.20  10/25/2016 - Present        Glaucoma ICD-10-CM: H40.9  ICD-9-CM: 365.9  1/13/2014 - Present        Hypertrophic cardiomyopathy ([facility] ICD-10-CM: I42.2  ICD-9-CM: 425.18  6/1/2012 - Present        Paroxysmal atrial fibrillation ([facility] ICD-10-CM: I48.0  ICD-9-CM: 427.31  11/29/2011 - Present        Chronic diastolic heart failure (HCC) ICD-10-CM: I50.32  ICD-9-CM: 428.32  2/26/2011 - Present        RESOLVED: Carotid occlusion, left ICD-10-CM: I65.22  ICD-9-CM: 433.10  10/25/2016 - 1/9/2017        RESOLVED: Acute CVA (cerebrovascular accident) ([facility] ICD-10-CM: I63.9  ICD-9-CM: 434.91  10/24/2016 - 1/9/2017        RESOLVED: Orthostatic hypotension ICD-10-CM: I95.1  ICD-9-CM: 458.0  10/28/2015 - 1/9/2017        RESOLVED: Acute embolic stroke ([facility] QTC-47-SJ: I63.9  ICD-9-CM: 434.11  6/26/2015 - 1/9/2017        RESOLVED: Dyslipidemia ICD-10-CM: E78.5  ICD-9-CM: 272.4  6/25/2015 - 1/9/2017        RESOLVED: Pulmonary nodule ICD-10-CM: R91.1  ICD-9-CM: 793.11  6/25/2015 - 1/9/2017        RESOLVED: Receptive aphasia ICD-10-CM: R47.01  ICD-9-CM: 784.3  6/25/2015 - 8/23/2015        RESOLVED: Dysarthria ICD-10-CM: R47.1  ICD-9-CM: 784.51  6/25/2015 - 8/23/2015        RESOLVED: Expressive aphasia ICD-10-CM: R47.01  ICD-9-CM: 784.3  6/25/2015 - 8/23/2015        RESOLVED: ADHF (acute decompensated heart failure) ([facility] ICD-10-CM: I50.9  ICD-9-CM: 428.0  6/24/2015 - 8/23/2015        RESOLVED: Cerebral ventriculomegaly due to brain atrophy ICD-10-CM: G31.9  ICD-9-CM: 331.9  6/24/2015 - 8/23/2015        RESOLVED: Left-sided visual neglect ICD-10-CM: R41.4  ICD-9-CM: 781.8  6/24/2015 - 8/23/2015        RESOLVED: Chest pain ICD-10-CM: R07.9  ICD-9-CM: 786.50  6/23/2015 - 8/23/2015        RESOLVED: RYAN (dyspnea on exertion) ICD-10-CM: R06.09  ICD-9-CM: 786.09  5/22/2015 - 8/23/2015        RESOLVED: Chest tightness ICD-10-CM: R07.89  ICD-9-CM: 786.59  5/22/2015 - 8/23/2015        RESOLVED: Anemia ICD-10-CM: D64.9  ICD-9-CM: 285.9  1/29/2014 - 1/9/2017        RESOLVED: Supratherapeutic INR ICD-10-CM: R79.1  ICD-9-CM: 790.92  1/13/2014 - 1/29/2014        RESOLVED: Melena ICD-10-CM: K92.1  ICD-9-CM: 578.1  1/13/2014 - 1/29/2014        RESOLVED: Macrocytic anemia ICD-10-CM: D53.9  ICD-9-CM: 281.9  1/13/2014 - 1/29/2014        RESOLVED: GI bleed ICD-10-CM: K92.2  ICD-9-CM: 578.9  1/13/2014 - 1/29/2014        RESOLVED: Cough ICD-10-CM: R05  ICD-9-CM: 786.2  1/13/2014 - 1/29/2014        RESOLVED: Diarrhea ICD-10-CM: R19.7  ICD-9-CM: 787.91  1/13/2014 - 1/29/2014        RESOLVED: Coffee ground emesis ICD-10-CM: K92.0  ICD-9-CM: 578.0  1/13/2014 - 1/29/2014        RESOLVED: Left flank pain ICD-10-CM: R10.9  ICD-9-CM: 789.09  1/13/2014 - 1/29/2014        RESOLVED: Weakness generalized ICD-10-CM: R53.1  ICD-9-CM: 780.79  1/13/2014 - 8/23/2015        RESOLVED: Hypoxia ICD-10-CM: R09.02  ICD-9-CM: 799.02  1/13/2014 - 1/29/2014        RESOLVED: CAP (community acquired pneumonia) ICD-10-CM: J18.9  ICD-9-CM: 503  1/13/2014 - 1/29/2014        RESOLVED: Pericardial effusion ICD-10-CM: I31.3  ICD-9-CM: 423.9  1/13/2014 - 1/29/2014        RESOLVED: Elevated blood pressure reading without diagnosis of hypertension ICD-10-CM: R03.0  ICD-9-CM: 796.2  1/13/2014 - 1/29/2014        RESOLVED: Mitral regurgitation ICD-10-CM: I34.0  ICD-9-CM: 424.0  10/22/2010 - 1/9/2017              Subjective:    68 y.o.  female who presented to the Emergency Department complaining of leg swelling. She provides no hx. Family is present. I have reviewed records. A few months ago she had a CVA, and has been persistently more immobile since.  She has severe dementia and other medical issues. Family noted LLE swelling yesterday, and she had mild LLE discomfort today. She is supposed to be on coumadin for Afib, but her INR is only 1.3. ER workup shows extensive LLE DVT and ARF. 1/10:  Dopplers revealed extensive DVT LLE. Pt poorly responsive at baseline since CVA - now seems worse. Lovenox until coumadin therapeutic. Review of Systems:   A comprehensive review of systems was negative except for that written in the HPI. Objective:   Physical Exam:     Visit Vitals    /56    Pulse 77    Temp 99 °F (37.2 °C)    Resp 20    Ht 5' 4\" (1.626 m)    Wt 61.7 kg (136 lb)    SpO2 96%    Breastfeeding No    BMI 23.34 kg/m2      O2 Device: Room air    Temp (24hrs), Av °F (37.2 °C), Min:98.6 °F (37 °C), Max:99.6 °F (37.6 °C)    01/10 0701 - 01/10 1900  In: 120 [P.O.:120]  Out: -         General:   no distress, appears stated age. Head:  Normocephalic, without obvious abnormality, atraumatic. Eyes:  Conjunctivae/corneas clear. EOMs intact. Nose: Nares normal. Septum midline. Mucosa normal. No drainage or sinus tenderness. Throat: Lips, mucosa, and tongue moist..   Neck: Supple, symmetrical, trachea midline, no adenopathy, thyroid: no enlargement/tenderness/nodules and no JVD. Murmer radiates to carotids   Lungs:   Scattered rhonchi bilaterally. Chest wall:  No tenderness or deformity. Heart:  Regular rate and rhythm, S1, S2 normal, 2/6 murmur loudest axilla, apex and LSB. No click, rub or gallop. Abdomen:   Soft, apparently non-tender. Bowel sounds normal. No masses,  No organomegaly. Extremities: no cyanosis or RLE edema; LLE with 2+ edema. No calf tenderness or cords on right apparent. Skin: Skin color, texture, turgor normal. No rashes or lesions   Neurologic:  Alert and oriented X 0-1.          Data Review:     Bilat LE Dopplers 17:  CONCLUSION: Left lower extremity venous duplex positive for acute deep  venous thrombosis and thrombophlebitis throughout the left leg from  external iliac vein down through the calf. Right common femoral vein  is thrombus free.     Recent Days:  Recent Labs      01/09/17   1502   WBC  11.0   HGB  11.4*   HCT  36.3   PLT  156     Recent Labs      01/10/17   0623  01/09/17   1502   NA   --   141   K   --   4.3   CL   --   103   CO2   --   29   GLU   --   113*   BUN   --   25*   CREA   --   1.17*   CA   --   8.7   INR  1.3*  1.3*     No results for input(s): PH, PCO2, PO2, HCO3, FIO2 in the last 72 hours. 24 Hour Results:  Recent Results (from the past 24 hour(s))   CBC WITH AUTOMATED DIFF    Collection Time: 01/09/17  3:02 PM   Result Value Ref Range    WBC 11.0 3.6 - 11.0 K/uL    RBC 3.60 (L) 3.80 - 5.20 M/uL    HGB 11.4 (L) 11.5 - 16.0 g/dL    HCT 36.3 35.0 - 47.0 %    .8 (H) 80.0 - 99.0 FL    MCH 31.7 26.0 - 34.0 PG    MCHC 31.4 30.0 - 36.5 g/dL    RDW 12.7 11.5 - 14.5 %    PLATELET 990 527 - 321 K/uL    NEUTROPHILS 71 32 - 75 %    LYMPHOCYTES 19 12 - 49 %    MONOCYTES 8 5 - 13 %    EOSINOPHILS 1 0 - 7 %    BASOPHILS 1 0 - 1 %    ABS. NEUTROPHILS 7.8 1.8 - 8.0 K/UL    ABS. LYMPHOCYTES 2.1 0.8 - 3.5 K/UL    ABS. MONOCYTES 0.9 0.0 - 1.0 K/UL    ABS. EOSINOPHILS 0.1 0.0 - 0.4 K/UL    ABS.  BASOPHILS 0.1 0.0 - 0.1 K/UL    DF SMEAR SCANNED      RBC COMMENTS MACROCYTOSIS  1+       METABOLIC PANEL, BASIC    Collection Time: 01/09/17  3:02 PM   Result Value Ref Range    Sodium 141 136 - 145 mmol/L    Potassium 4.3 3.5 - 5.1 mmol/L    Chloride 103 97 - 108 mmol/L    CO2 29 21 - 32 mmol/L    Anion gap 9 5 - 15 mmol/L    Glucose 113 (H) 65 - 100 mg/dL    BUN 25 (H) 6 - 20 MG/DL    Creatinine 1.17 (H) 0.55 - 1.02 MG/DL    BUN/Creatinine ratio 21 (H) 12 - 20      GFR est AA 55 (L) >60 ml/min/1.73m2    GFR est non-AA 45 (L) >60 ml/min/1.73m2    Calcium 8.7 8.5 - 10.1 MG/DL   PROTHROMBIN TIME + INR    Collection Time: 01/09/17  3:02 PM   Result Value Ref Range    INR 1.3 (H) 0.9 - 1.1      Prothrombin time 12.7 (H) 9.0 - 11.1 sec   EKG, 12 LEAD, INITIAL    Collection Time: 01/09/17  5:22 PM   Result Value Ref Range    Ventricular Rate 89 BPM    Atrial Rate 89 BPM    P-R Interval 246 ms    QRS Duration 108 ms    Q-T Interval 400 ms    QTC Calculation (Bezet) 486 ms    Calculated P Axis 105 degrees    Calculated R Axis 21 degrees    Calculated T Axis 174 degrees    Diagnosis       Sinus rhythm with 1st degree AV block  Left ventricular hypertrophy with repolarization abnormality  Abnormal ECG  When compared with ECG of 03-JAN-2017 19:47,  UT interval has increased  ST no longer depressed in Anterior leads  Confirmed by WISAM Cesar MD ([id number]) on 1/10/2017 9:00:25 AM     URINALYSIS W/ REFLEX CULTURE    Collection Time: 01/09/17  5:40 PM   Result Value Ref Range    Color YELLOW/STRAW      Appearance CLOUDY (A) CLEAR      Specific gravity 1.028 1.003 - 1.030      pH (UA) 5.0 5.0 - 8.0      Protein NEGATIVE  NEG mg/dL    Glucose NEGATIVE  NEG mg/dL    Ketone NEGATIVE  NEG mg/dL    Bilirubin NEGATIVE  NEG      Blood MODERATE (A) NEG      Urobilinogen 0.2 0.2 - 1.0 EU/dL    Nitrites NEGATIVE  NEG      Leukocyte Esterase MODERATE (A) NEG      WBC 20-50 0 - 4 /hpf    RBC 10-20 0 - 5 /hpf    Epithelial cells FEW FEW /lpf    Bacteria NEGATIVE  NEG /hpf    UA:UC IF INDICATED URINE CULTURE ORDERED (A) CNI     PROTHROMBIN TIME + INR    Collection Time: 01/10/17  6:23 AM   Result Value Ref Range    INR 1.3 (H) 0.9 - 1.1      Prothrombin time 13.6 (H) 9.0 - 11.1 sec   GLUCOSE, POC    Collection Time: 01/10/17  7:19 AM   Result Value Ref Range    Glucose (POC) 86 65 - 100 mg/dL    Performed by Ayala Christiansen (PCT)        Medications reviewed  Current Facility-Administered Medications   Medication Dose Route Frequency    warfarin (COUMADIN) tablet 5 mg  5 mg Oral ONCE    bimatoprost (LUMIGAN) 0.01 % ophthalmic drops 1 Drop  1 Drop Right Eye QHS    brimonidine (ALPHAGAN P) 0.1 % ophthalmic solution 1 Drop  1 Drop Right Eye BID    dorzolamide-timolol (COSOPT) 22.3-6.8 mg/mL ophthalmic solution 1 Drop  1 Drop Right Eye BID    memantine ER (NAMENDA XR) capsule 28 mg  28 mg Oral DAILY    QUEtiapine (SEROquel) tablet 12.5 mg  12.5 mg Oral QHS    naloxone (NARCAN) injection 0.4 mg  0.4 mg IntraVENous PRN    0.9% sodium chloride infusion  150 mL/hr IntraVENous CONTINUOUS    acetaminophen (TYLENOL) tablet 650 mg  650 mg Oral Q4H PRN    HYDROcodone-acetaminophen (NORCO) 5-325 mg per tablet 1 Tab  1 Tab Oral Q4H PRN    diphenhydrAMINE (BENADRYL) capsule 25 mg  25 mg Oral Q4H PRN    ondansetron (ZOFRAN) injection 4 mg  4 mg IntraVENous Q4H PRN    enoxaparin (LOVENOX) injection 60 mg  1 mg/kg SubCUTAneous Q12H    metoprolol tartrate (LOPRESSOR) tablet 75 mg  75 mg Oral BID    disopyramide phosphate (NORPACE) capsule 100 mg  100 mg Oral BID    Warfarin- Pharmacy to dose daily   Other Rx Dosing/Monitoring       Care Plan discussed with: Patient/Family and Nurse    Total time spent with patient: 30 minutes.     Silvia Bates MD

## 2017-01-10 NOTE — PROGRESS NOTES
[facility] Dosing Services: 1/10/17    Consult for Warfarin Dosing by Pharmacy by Dr. Hernández Solid provided for this 68 y.o.  female , for indication of Venous Thrombosis. Day of Therapy continued from home 5mg Monday, Tuesday, Wednesday ,Thursday, and Friday. 6mg on Saturday and Sunday  Dose to achieve an INR goal of 2-3    Order entered for  Warfarin  5 (mg) ordered to be given today at 18:00. Significant drug interactions: Lovenox 60mg every 12 hours until therapeutic INR  Previous dose given 5mg on 1/9/17   PT/INR Lab Results   Component Value Date/Time    INR 1.3 01/10/2017 06:23 AM    INR (POC) 1.0 10/24/2016 06:33 PM    INR POC 2.2 05/28/2015 01:01 PM      Platelets Lab Results   Component Value Date/Time    PLATELET 224 21/05/9238 03:02 PM      H/H Lab Results   Component Value Date/Time    HGB 11.4 01/09/2017 03:02 PM        Pharmacy to follow daily and will provide subsequent Warfarin dosing based on clinical status.     Roxane Hall, PharmD, BCPS  Contact information [phone]

## 2017-01-10 NOTE — PROGRESS NOTES
CM Note:  Pt's family wanted pt to go [facility]. A referral was sent in [facility]. Alisha Nevaeh unable to accept as her insurance is out of network.   ANUJA Haider

## 2017-01-10 NOTE — H&P
[address]  [phone]    Hospitalist Admission Note      NAME:  Kavon Veloz   :      MRN:  [id number]     PCP:  Vicente Estevez MD     Date/Time:  2017 7:15 PM          Subjective:     CHIEF COMPLAINT: leg swelling     HISTORY OF PRESENT ILLNESS:     Ms. Yessi Tellez is a 68 y.o.  female who presented to the Emergency Department complaining of leg swelling. She provides no hx. Family is present. I have reviewed records. A few months ago she had a CVA, and has been persistently more immobile since. She has severe dementia and other medical issues. Family noted LLE swelling yesterday, and she had mild LLE discomfort today. She is supposed to be on coumadin for Afib, but her INR is only 1.3. ER workup shows extensive LLE DVT and ARF. We will admit her for management. Past Medical History   Diagnosis Date    CAD (coronary artery disease)     Carotid occlusion, left 10/25/2016    Cerebral atrophy 10/25/2016    Dementia     Depression     Glaucoma     Hx of completed stroke     Hyperlipidemia LDL goal <70 10/25/2016    Hypertension     Hypertrophic cardiomyopathy ([facility] 2012    Hypertrophic subaortic stenosis (idiopathic) (HCC)     Mitral regurgitation     Paroxysmal atrial fibrillation ([address] 2011        Past Surgical History   Procedure Laterality Date    Cardiac catheterization       Normal cors, EF 60%, significant MR. EDP 23    Echo 2d adult  2010     HCM, Resting LVOT gradient 129 mmHg, grade 3-4 diastolic dysfunction, MAC, mod-severe MR, marked LAE.      Echo 2d adult  11     mod-severe LVH, EF 65%, grade 3-4 diastolic dysfunction, LVOT resting gradient 42 mmHg, severe LAE, mod-severe MR, PA 46 mmHg    Pr cardiac surg procedure unlist         Social History   Substance Use Topics    Smoking status: Never Smoker    Smokeless tobacco: Never Used    Alcohol use No Family History   Problem Relation Age of Onset    Other Other      patient specifically denies the following in 1st degree relative: HTN, DM, CVA, CAD, ca    Heart Disease Mother         No Known Allergies     Prior to Admission medications    Medication Sig Start Date End Date Taking? Authorizing Provider   warfarin (COUMADIN) 6 mg tablet Take 6 mg by mouth two (2) times a week. Saturday, Sunday   Yes Aixa Salter MD   cephALEXin (KEFLEX) 500 mg capsule Take 1 Cap by mouth two (2) times a day for 7 days. Patient taking differently: Take 500 mg by mouth two (2) times a day. Indications: BACTERIAL URINARY TRACT INFECTION 1/3/17 1/10/17 Yes Samantha Knight MD   warfarin (COUMADIN) 4 mg tablet Take 5 mg by mouth five (5) days a week. Monday, Tuesday, Wednesday, Thursday, friday   Yes Historical Provider   atorvastatin (LIPITOR) 40 mg tablet Take 1 Tab by mouth nightly. 11/4/16  Yes Karen Ochoa MD   QUEtiapine (SEROQUEL) 25 mg tablet Take 0.5 Tabs by mouth two (2) times a day. Indications: post CVA and dementia behavioral disorder  Patient taking differently: Take 12.5 mg by mouth nightly. Indications: post CVA and dementia behavioral disorder 11/4/16  Yes Karen Ochoa MD   potassium chloride (KLOR-CON) 20 mEq packet Take 1 Packet by mouth two (2) times a day. Patient taking differently: Take 20 mEq by mouth daily. 11/4/16  Yes Karen Ochoa MD   bumetanide (BUMEX) 0.5 mg tablet Take 0.5 mg by mouth three (3) days a week. Patient takes on Monday, Wednesday, Friday   Yes Historical Provider   bimatoprost (LUMIGAN) 0.01 % ophthalmic drops Administer 1 Drop to right eye nightly. Yes Historical Provider   memantine ER (NAMENDA XR) 28 mg capsule Take 1 Cap by mouth daily.  9/21/16  Yes Fran Gordillo NP   disopyramide phosphate (NORPACE) 100 mg capsule take 1 capsule by mouth twice a day 7/1/16  Yes Mundo Perez MD   metoprolol tartrate (LOPRESSOR) 25 mg tablet take 3 tablets by mouth twice a day 6/28/16  Yes Martha Mendez MD   brimonidine (ALPHAGAN) 0.2 % ophthalmic solution Administer 1 Drop to right eye two (2) times a day. Yes Historical Provider   dorzolamide-timolol (COSOPT) 2-0.5 % ophthalmic solution Administer 1 Drop to right eye two (2) times a day.    Yes Historical Provider       Review of Systems: Cannot obtain from patient  (bold if positive, if negative)    Gen:  Eyes:  ENT:  CVS:  Pulm:  GI:    :    MS:  Skin:  Psych:  Endo:    Hem:  Renal:    Neuro:        Objective:      VITALS:    Vital signs reviewed; most recent are:    Visit Vitals    /54    Pulse 90    Temp 98.6 °F (37 °C)    Resp 23    Ht 5' 4\" (1.626 m)    Wt 61.7 kg (136 lb)    SpO2 94%    BMI 23.34 kg/m2     SpO2 Readings from Last 6 Encounters:   01/09/17 94%   01/03/17 97%   12/27/16 95%   11/22/16 99%   11/04/16 91%   10/04/16 95%        No intake or output data in the 24 hours ending 01/09/17 1915     Exam:     Physical Exam:    Gen:  Well-developed, well-nourished, in no acute distress  HEENT:  Pink conjunctivae, PERRL, hearing intact to voice, moist mucous membranes  Neck:  Supple, without masses, thyroid non-tender  Resp:  No accessory muscle use, clear breath sounds without wheezes rales or rhonchi  Card:  No murmurs, tachycardia S1, S2 without thrills, bruits, LLE peripheral edema  Abd:  Soft, non-tender, non-distended, normoactive bowel sounds are present, no mass  Lymph:  No cervical or inguinal adenopathy  Musc:  No cyanosis or clubbing  Skin:  No rashes or ulcers, skin turgor is good  Neuro:  Cranial nerves are grossly intact, general motor weakness, does not follow commands appropriately  Psych:  Poor insight, not oriented to person, place and time, alert     Labs:    Recent Labs      01/09/17   1502   WBC  11.0   HGB  11.4*   HCT  36.3   PLT  156     Recent Labs      01/09/17   1502   NA  141   K  4.3   CL  103   CO2  29   GLU  113*   BUN  25*   CREA  1.17*   CA  8.7     Lab Results Component Value Date/Time    Glucose (POC) 139 11/01/2016 05:37 PM    Glucose (POC) 76 10/27/2016 08:23 PM    Glucose (POC) 76 07/21/2016 08:58 PM    Glucose (POC) 108 06/22/2015 03:04 PM     No results for input(s): PH, PCO2, PO2, HCO3, FIO2 in the last 72 hours. Recent Labs      01/09/17   1502   INR  1.3*     All Micro Results     None          I have reviewed previous records       Assessment and Plan:      Deep venous thrombosis - New acute and extensive. Start empiric lovenox BID. Discuss options and goals with family. Prior admission confirms DNR status. Previously on coumadin, resume that, lovenox bridge. ARF (acute renal failure) / Dehydration - POA, likely due to poor PO intake. Hydrate. Hold PO K and hold Bumex    Hypertrophic cardiomyopathy / Chronic diastolic heart failure - Monitor with symptoms, given need for hydration. Hold bumex, continue metoprolol, home norpace    Dementia / Cerebral atrophy - Severe. Previously required sitter. Continue seroquel and memantine. Paroxysmal atrial fibrillation / CAD (coronary artery disease) - No acute symptoms. Continue BB. Not on ASA. Hold statin. Glaucoma - Continue lumigan, alphagan and cospot    Hyperlipidemia LDL goal <70 - Given age and co morbidities, I would not test or treat hyperlipidemia    Weakness due to cerebrovascular accident - Fall precautions. Pureed and nector diet. She is at baseline, making no improvement since stroke. Family wants out of SNF, and into LTC      Goals of care, counseling/discussion - Consult palliative care. May consider comfort goals. Recent UTI - Had completed keflex. Current UA unremarkable. Monitor.       Telemetry reviewed:   normal sinus rhythm    Risk of deterioration: high      Total time spent with patient: 79 Minutes Signed out to Dr Yissel Ward at [facility] discussed with: Patient, Family, Nursing Staff and >50% of time spent in counseling and coordination of care    Discussed:  Care Plan       ___________________________________________________    Attending Physician: Sasha Rivera MD

## 2017-01-10 NOTE — PROGRESS NOTES
Palliative Medicine  [place]: [phone]  [facility]: [phone])      Resuscitation Status: DNR   Durable DNR addressed? [x] Yes   [] No    [] Not Applicable *Already in place    Advance Care Planning 1/10/2017   Patient's Healthcare Decision Maker is: Legal Next of [address] 69   Primary Decision Maker Name Brent Chawla   Primary Decision Maker Phone Number [phone]   Primary Decision Maker Relationship to Patient Spouse   Secondary Decision Maker Name -   Secondary Decision [address] Phone Number -   Secondary Decision Maker Relationship to Patient -   Confirm Advance Directive -   Patient Would Like to Complete Advance Directive -   Does the patient have other document types Do Not Resuscitate           Patient / Family Encounter Documentation    Participants (names): Pt, Palliative Medicine (Dr. Pura Michael, [facility])    Narrative:  Staff in room tending to pt at time of visit, no family present. Pt was alert, being fed breakfast, speech was garbled. Per chart, pt and  Neha Guzman have been  64 yrs, have 4 sons, 1 dtr is . Pt was seen by Palliative NPs in 2016 after suffering a stroke. Pt has since been residing at [facility]. Psychosocial Issues Identified: No issues identified this visit    Goals of Care / Plan: Dr. Na Jin spoke with  by phone following visit, please see separate note for details. Discussed pt's care with Care Manager. Will follow for support. Thank you for including Palliative Medicine in the care of Ms. Sina Williamson.     Jazlyn [address] MELISSA Cuevas, [facility] [address]  [phone]

## 2017-01-10 NOTE — PROGRESS NOTES
TRANSFER - IN REPORT:    Verbal report received from Gunner Bush RN(name) on [place]  being received from ER(unit) for routine progression of care      Report consisted of patients Situation, Background, Assessment and   Recommendations(SBAR). Information from the following report(s) SBAR, Kardex, ED Summary, Procedure Summary, MAR, Accordion, Recent Results and Cardiac Rhythm NSR, 1st degree block was reviewed with the receiving nurse. Opportunity for questions and clarification was provided. Assessment completed upon patients arrival to unit and care assumed. Primary Nurse Boris Viramontes RN and Marcelo Dempsey RN performed a dual skin assessment on this patient No impairment noted  Unruly score is 15    Scar on patient abdomen. Scratch on patient's right shin. Bedside and Verbal shift change report given to Nohelia Bautista RN (oncoming nurse) by Maria Fernanda Peck RN (offgoing nurse). Report included the following information SBAR, Kardex, ED Summary, Intake/Output, MAR, Accordion, Recent Results, Med Rec Status and Cardiac Rhythm NSR, 1st degree AV block.

## 2017-01-10 NOTE — CONSULTS
Palliative Medicine Consult  [place]: [phone] ([id number])    Patient Name: Corky Walker  YOB: 1940    Date of Initial Consult: 1/10/16  Reason for Consult: Care decisions  Requesting Provider: Dr. Lilia Carvajal   Primary Care Physician: Bre Champion MD      SUMMARY:   Corky Walker is a 68y.o. year old with a past history of CVA, CAD, Dementia, HLD, Hypertrophic CM, PAF, Aphasia, who was admitted on 1/9/2017 from [facility] with a diagnosis of DVT, HATTIE. Current medical issues leading to Palliative Medicine involvement include: Care decisions. Chart reviewed. Patient seen by our team in October after her stroke. Talked with her , patient still total assist with having to be fed, bed bound. They are looking to move her closer to their home in [address]:   1. Goals of care discussion  2. Debility  3. CVA  4. FTT  5. Acute DVT       PLAN:   1. [place] Camera and I met with patient. She is essentially nonverbal, and being fed her breakfast. Talked with her  over the phone and he wants to continue current therapy but does understand her extensive stroke has left her very vulnerable to these potential complications such as DVTs and infections. He would also like to have her moved to [facility] which is closer to family. 2. Code status-remains DNR status  3. AMD-the one on file in our EMR is financial. Her  remains MPOA by law. 4. Psychosocial support- frustrated his wife has not made a lot of progress from the CVA but has support from other family. Hoping to move her closer to home if insurance allows  5. Initial consult note routed to primary continuity provider  6.  Communicated plan of care with: Palliative [facility]       GOALS OF CARE / TREATMENT PREFERENCES:   [====Goals of Care====]  GOALS OF CARE:  Patient / health care proxy stated goals: take her to [facility] per       TREATMENT PREFERENCES:   Code Status: DNR    Advance Care Planning:  Advance Care Planning 1/10/2017   Patient's Healthcare Decision Maker is: Legal Next of Little 69   Primary Decision Maker Name Juana Balderas   Primary Decision Maker Phone Number [phone]   Primary Decision Maker Relationship to Patient Spouse   Secondary Decision Maker Name -   Secondary Decision Maker Phone Number -   Secondary Decision Maker Relationship to Patient -   Confirm Advance Directive -   Patient Would Like to Complete Advance Directive -   Does the patient have other document types Do Not Resuscitate       Other:    The palliative care team has discussed with patient / health care proxy about goals of care / treatment preferences for patient.  [====Goals of Care====]         HISTORY:     History obtained from: chart, nursing staff, family    CHIEF COMPLAINT: leg pain    HPI/SUBJECTIVE:    The patient is:   [] Verbal and participatory  [x] Non-participatory due to: CVA  Patient is being fed breakfast. Appears comfortable.      Clinical Pain Assessment (nonverbal scale for severity on nonverbal patients):   [++++ Clinical Pain Assessment++++]  Adult Non-Verbal Pain Assessment    Face  [x] 0   No particular expression or smile  [] 1   Occasional grimace, tearing, frowning, wrinkled forehead  [] 2   Frequent grimace, tearing, frowning, wrinkled forehead    Activity (movement)  [x] 0   Lying quietly, normal position  [] 1   Seeking attention through movement or slow, cautious movement  [] 2   Restless, excessive activity and/or withdrawal reflexes    Guarding  [x] 0   Lying quietly, no positioning of hands over areas of body  [] 1   Splinting areas of the body, tense  [] 2   Rigid, stiff    Physiology (vital signs)  [x] 0   Stable vital signs  [] 1   Change in any of the following: SBP > 20mm Hg; HR > 20/minute  [] 2   Change in any of the following: SBP > 30mm Hg; HR > 25/minute    Respiratory  [x] 0   Baseline RR/SpO2, compliant with ventilator  [] 1   RR > 10 above baseline, or 5% drop SpO2, mild asynchrony with ventilator  [] 2   RR > 20 above baseline, or 10% drop SpO2, asynchrony with ventilator    Total Non-Verbal Pain Score: 0  [++++ Clinical Pain Assessment++++]     FUNCTIONAL ASSESSMENT:     Palliative Performance Scale (PPS):  PPS: 60       PSYCHOSOCIAL/SPIRITUAL SCREENING:     Advance Care Planning:  Advance Care Planning 1/10/2017   Patient's Healthcare Decision Maker is: Legal Next of Little 69   Primary Decision Maker Name Cuco Adame   Primary Decision Maker Phone Number [phone]   Primary Decision Maker Relationship to Patient Spouse   Secondary Decision Maker Name -   Secondary Decision Maker Phone Number -   Secondary Decision Maker Relationship to Patient -   Confirm Advance Directive -   Patient Would Like to Complete Advance Directive -   Does the patient have other document types Do Not Resuscitate        Any spiritual / Muslim concerns:  [] Yes /  [x] No    Caregiver Burnout:  [] Yes /  [] No /  [x] No Caregiver Present      Anticipatory grief assessment:   [x] Normal  / [] Maladaptive       ESAS Anxiety: Anxiety: 0    ESAS Depression: Depression: 0        REVIEW OF SYSTEMS:     Positive and pertinent negative findings in ROS are noted above in HPI. The following systems were [x] reviewed / [] unable to be reviewed as noted in HPI  Other findings are noted below. Systems: constitutional, ears/nose/mouth/throat, respiratory, gastrointestinal, genitourinary, musculoskeletal, integumentary, neurologic, psychiatric, endocrine. Positive findings noted below. Modified ESAS Completed by: provider   Fatigue: 2 Drowsiness: 2   Depression: 0 Pain: 0   Anxiety: 0 Nausea: 0   Anorexia: 0 Dyspnea: 0     Constipation: No              PHYSICAL EXAM:     Wt Readings from Last 3 Encounters:   01/09/17 136 lb (61.7 kg)   01/03/17 150 lb (68 kg)   12/27/16 143 lb 6.4 oz (65 kg)     Blood pressure 104/56, pulse 77, temperature 99 °F (37.2 °C), resp.  rate 20, height 5' 4\" (1.626 m), weight 136 lb (61.7 kg), SpO2 96 %, not currently breastfeeding.   Pain:  Pain Scale 1: Visual  Pain Intensity 1: 0                 Last bowel movement, if known:     Constitutional: awake, attempts to talk, interact but not able to participate in conversation  Eyes: pupils equal, anicteric  ENMT: no nasal discharge, moist mucous membranes  Cardiovascular: regular rhythm, distal pulses intact  Respiratory: breathing not labored, symmetric  Gastrointestinal: soft non-tender, +bowel sounds  Musculoskeletal: no deformity, no tenderness to palpation  Skin: warm, dry  Neurologic: awake, attempts to say her name    Other:       HISTORY:     Active Problems:    Chronic diastolic heart failure ([address] (2/26/2011)      Paroxysmal atrial fibrillation ([address] (11/29/2011)      Hypertrophic cardiomyopathy ([address] (6/1/2012)      Glaucoma (1/13/2014)      Hyperlipidemia LDL goal <70 (10/25/2016)      Cerebral atrophy (10/25/2016)      Dementia (10/25/2016)      Weakness due to cerebrovascular accident (CVA) ([address] (10/26/2016)      Goals of care, counseling/discussion (10/26/2016)      Deep venous thrombosis ([address] (1/9/2017)      CAD (coronary artery disease) ()      Dehydration (1/9/2017)      ARF (acute renal failure) ([address] (1/9/2017)      Past Medical History   Diagnosis Date    CAD (coronary artery disease)     Carotid occlusion, left 10/25/2016    Cerebral atrophy 10/25/2016    Dementia     Depression     Glaucoma     Hx of completed stroke     Hyperlipidemia LDL goal <70 10/25/2016    Hypertension     Hypertrophic cardiomyopathy ([address] 6/1/2012    Hypertrophic subaortic stenosis (idiopathic) (HCC)     Mitral regurgitation     Paroxysmal atrial fibrillation ([address] 11/29/2011      Past Surgical History   Procedure Laterality Date    Cardiac catheterization  2004     Normal cors, EF 60%, significant MR. EDP 23    Echo 2d adult  8/20/2010     HCM, Resting LVOT gradient 129 mmHg, grade 3-4 diastolic dysfunction, MAC, mod-severe MR, marked LAE.     Echo 2d adult  5/27/11     mod-severe LVH, EF 65%, grade 3-4 diastolic dysfunction, LVOT resting gradient 42 mmHg, severe LAE, mod-severe MR, PA 46 mmHg    Pr cardiac surg procedure unlist        Family History   Problem Relation Age of Onset    Other Other      patient specifically denies the following in 1st degree relative: HTN, DM, CVA, CAD, ca    Heart Disease Mother       History reviewed, no pertinent family history.   Social History   Substance Use Topics    Smoking status: Never Smoker    Smokeless tobacco: Never Used    Alcohol use No     No Known Allergies   Current Facility-Administered Medications   Medication Dose Route Frequency    warfarin (COUMADIN) tablet 5 mg  5 mg Oral ONCE    bimatoprost (LUMIGAN) 0.01 % ophthalmic drops 1 Drop  1 Drop Right Eye QHS    brimonidine (ALPHAGAN P) 0.1 % ophthalmic solution 1 Drop  1 Drop Right Eye BID    dorzolamide-timolol (COSOPT) 22.3-6.8 mg/mL ophthalmic solution 1 Drop  1 Drop Right Eye BID    memantine ER (NAMENDA XR) capsule 28 mg  28 mg Oral DAILY    QUEtiapine (SEROquel) tablet 12.5 mg  12.5 mg Oral QHS    naloxone (NARCAN) injection 0.4 mg  0.4 mg IntraVENous PRN    0.9% sodium chloride infusion  150 mL/hr IntraVENous CONTINUOUS    acetaminophen (TYLENOL) tablet 650 mg  650 mg Oral Q4H PRN    HYDROcodone-acetaminophen (NORCO) 5-325 mg per tablet 1 Tab  1 Tab Oral Q4H PRN    diphenhydrAMINE (BENADRYL) capsule 25 mg  25 mg Oral Q4H PRN    ondansetron (ZOFRAN) injection 4 mg  4 mg IntraVENous Q4H PRN    enoxaparin (LOVENOX) injection 60 mg  1 mg/kg SubCUTAneous Q12H    metoprolol tartrate (LOPRESSOR) tablet 75 mg  75 mg Oral BID    disopyramide phosphate (NORPACE) capsule 100 mg  100 mg Oral BID    Warfarin- Pharmacy to dose daily   Other Rx Dosing/Monitoring          LAB AND IMAGING FINDINGS:     Lab Results   Component Value Date/Time    WBC 11.0 01/09/2017 03:02 PM    HGB 11.4 01/09/2017 03:02 PM    PLATELET 579 26/73/9270 03:02 PM     Lab Results   Component Value Date/Time    Sodium 141 01/09/2017 03:02 PM    Potassium 4.3 01/09/2017 03:02 PM    Chloride 103 01/09/2017 03:02 PM    CO2 29 01/09/2017 03:02 PM    BUN 25 01/09/2017 03:02 PM    Creatinine 1.17 01/09/2017 03:02 PM    Calcium 8.7 01/09/2017 03:02 PM    Magnesium 2.1 10/27/2016 12:58 AM    Phosphorus 2.5 01/16/2014 04:12 AM      Lab Results   Component Value Date/Time    AST 46 01/03/2017 08:24 PM    Alk. phosphatase 103 01/03/2017 08:24 PM    Protein, total 7.2 01/03/2017 08:24 PM    Albumin 2.9 01/03/2017 08:24 PM    Globulin 4.3 01/03/2017 08:24 PM     Lab Results   Component Value Date/Time    INR 1.3 01/10/2017 06:23 AM    INR (POC) 1.0 10/24/2016 06:33 PM    INR POC 2.2 05/28/2015 01:01 PM    Prothrombin time 13.6 01/10/2017 06:23 AM    aPTT 74.5 06/24/2015 03:00 AM      Lab Results   Component Value Date/Time    Iron 49 01/15/2014 11:45 AM    TIBC 252 01/15/2014 11:45 AM    Iron % saturation 19 01/15/2014 11:45 AM    Ferritin 84 01/15/2014 11:45 AM      No results found for: PH, PCO2, PO2  No components found for: [place]   Lab Results   Component Value Date/Time    CK 62 01/03/2017 08:24 PM    CK - MB 1.2 01/03/2017 08:24 PM                Total time: 50  Counseling / coordination time: 40  > 50% counseling / coordination?: yes    Prolonged service was provided for  []30 min   []75 min in face to face time in the presence of the patient. Time Start:   Time End:   Note: this can only be billed with 63296 (initial) or 52045 (follow up). If multiple start / stop times, list each separately.

## 2017-01-11 LAB
GLUCOSE BLD STRIP.AUTO-MCNC: 126 MG/DL (ref 65–100)
GLUCOSE BLD STRIP.AUTO-MCNC: 131 MG/DL (ref 65–100)
GLUCOSE BLD STRIP.AUTO-MCNC: 82 MG/DL (ref 65–100)
GLUCOSE BLD STRIP.AUTO-MCNC: 88 MG/DL (ref 65–100)
INR PPP: 1.8 (ref 0.9–1.1)
PROTHROMBIN TIME: 18.1 SEC (ref 9–11.1)
SERVICE CMNT-IMP: ABNORMAL
SERVICE CMNT-IMP: ABNORMAL
SERVICE CMNT-IMP: NORMAL
SERVICE CMNT-IMP: NORMAL

## 2017-01-11 PROCEDURE — 74011250636 HC RX REV CODE- 250/636: Performed by: FAMILY MEDICINE

## 2017-01-11 PROCEDURE — 82962 GLUCOSE BLOOD TEST: CPT

## 2017-01-11 PROCEDURE — 65660000000 HC RM CCU STEPDOWN

## 2017-01-11 PROCEDURE — 36415 COLL VENOUS BLD VENIPUNCTURE: CPT | Performed by: INTERNAL MEDICINE

## 2017-01-11 PROCEDURE — 74011250636 HC RX REV CODE- 250/636: Performed by: INTERNAL MEDICINE

## 2017-01-11 PROCEDURE — 74011250637 HC RX REV CODE- 250/637: Performed by: INTERNAL MEDICINE

## 2017-01-11 PROCEDURE — 85610 PROTHROMBIN TIME: CPT | Performed by: INTERNAL MEDICINE

## 2017-01-11 RX ORDER — WARFARIN 2 MG/1
4 TABLET ORAL ONCE
Status: COMPLETED | OUTPATIENT
Start: 2017-01-11 | End: 2017-01-11

## 2017-01-11 RX ADMIN — SODIUM CHLORIDE 50 ML/HR: 900 INJECTION, SOLUTION INTRAVENOUS at 21:48

## 2017-01-11 RX ADMIN — METOPROLOL TARTRATE 75 MG: 25 TABLET ORAL at 09:00

## 2017-01-11 RX ADMIN — ENOXAPARIN SODIUM 60 MG: 60 INJECTION SUBCUTANEOUS at 09:00

## 2017-01-11 RX ADMIN — METOPROLOL TARTRATE 75 MG: 25 TABLET ORAL at 21:42

## 2017-01-11 RX ADMIN — DISOPYRAMIDE PHOSPHATE 100 MG: 100 CAPSULE ORAL at 21:41

## 2017-01-11 RX ADMIN — WARFARIN SODIUM 4 MG: 2 TABLET ORAL at 17:37

## 2017-01-11 RX ADMIN — DORZOLAMIDE HYDROCHLORIDE AND TIMOLOL MALEATE 1 DROP: 20; 5 SOLUTION/ DROPS OPHTHALMIC at 09:01

## 2017-01-11 RX ADMIN — QUETIAPINE FUMARATE 12.5 MG: 25 TABLET, FILM COATED ORAL at 21:41

## 2017-01-11 RX ADMIN — MEMANTINE HYDROCHLORIDE 28 MG: 28 CAPSULE, EXTENDED RELEASE ORAL at 09:00

## 2017-01-11 RX ADMIN — DORZOLAMIDE HYDROCHLORIDE AND TIMOLOL MALEATE 1 DROP: 20; 5 SOLUTION/ DROPS OPHTHALMIC at 21:43

## 2017-01-11 RX ADMIN — BRIMONIDINE TARTRATE 1 DROP: 1 SOLUTION/ DROPS OPHTHALMIC at 09:01

## 2017-01-11 RX ADMIN — ENOXAPARIN SODIUM 60 MG: 60 INJECTION SUBCUTANEOUS at 21:42

## 2017-01-11 RX ADMIN — BIMATOPROST 1 DROP: 0.1 SOLUTION/ DROPS OPHTHALMIC at 21:43

## 2017-01-11 RX ADMIN — DISOPYRAMIDE PHOSPHATE 100 MG: 100 CAPSULE ORAL at 09:00

## 2017-01-11 RX ADMIN — BRIMONIDINE TARTRATE 1 DROP: 1 SOLUTION/ DROPS OPHTHALMIC at 21:43

## 2017-01-11 RX ADMIN — SODIUM CHLORIDE 150 ML/HR: 900 INJECTION, SOLUTION INTRAVENOUS at 05:56

## 2017-01-11 NOTE — CDMP QUERY
Please clarify if this patient is being treated/managed for:    =>Functional Quadriplegia in the setting of dementia, s/p CVA requiring total care  =>Other Explanation of clinical findings  =>Unable to Determine (no explanation of clinical findings)    The medical record reflects the following clinical findings, treatment, and risk factors:    Risk Factors: PMH dementia, CVA  Clinical Indicators: total care patient  Treatment: assistance with all activities of daily living    Please clarify and document your clinical opinion in the progress notes and discharge summary including the definitive and/or presumptive diagnosis, (suspected or probable), related to the above clinical findings. Please include clinical findings supporting your diagnosis.     Mark BrunoPaladin Healthcare, [address] [facility]  [email or web address]

## 2017-01-11 NOTE — PROGRESS NOTES
[facility] Dosing Services: 1/11/17    Consult for Warfarin Dosing by Pharmacy by Dr. Rigo Turk provided for this 68 y.o.  female , for indication of Venous Thrombosis. Day of Therapy continued from home 5mg Monday, Tuesday, Wednesday ,Thursday, and Friday. 6mg on Saturday and Sunday  Dose to achieve an INR goal of 2-3    Order entered for  Warfarin  4 (mg) ordered to be given today at 18:00. INR increased ~0.5 which is a little more rapid then would like. Will administer a slightly lower dose of 4mg today. Anticipate that patient will be therapeutic on 1/12/17. Significant drug interactions: Lovenox 60mg every 12 hours until therapeutic INR  Previous dose given Warfarin 5mg   PT/INR Lab Results   Component Value Date/Time    INR 1.8 01/11/2017 05:53 AM    INR (POC) 1.0 10/24/2016 06:33 PM    INR POC 2.2 05/28/2015 01:01 PM      Platelets Lab Results   Component Value Date/Time    PLATELET 353 75/72/4400 03:02 PM      H/H Lab Results   Component Value Date/Time    HGB 11.4 01/09/2017 03:02 PM        Pharmacy to follow daily and will provide subsequent Warfarin dosing based on clinical status.     Tati AlfonsoD, BCPS  Contact information [phone]

## 2017-01-11 NOTE — PROGRESS NOTES
NUTRITION   RD Screen      Diet: pureed + nectar liquids  Body mass index is 23.34 kg/(m^2). Skin: wdl  PO Intake:  Needs to be fed, she is total care, bed-bound  Patient Vitals for the past 100 hrs:   % Diet Eaten   01/10/17 1948 100 %   01/10/17 1433 100 %   01/10/17 1030 90 %       Estimated Daily Nutrition Requirements:  Kcals:      6610-0924 kcals (MSJ x 1.2-1.3)            Protein:     60 gms pro (1 gm pro/kg actual wt)             Fluid:   1 ml/kcal    Pt screened for nutrition risk d/t pureed diet order. Pt had a stroke in 10/2016, and really has not improved in function at all since then. She is dependent with all ADL's, needs to be fed; has h/o cerebral atrophy, dementia, is essentially non-verbal.  Awaiting approval for LTC facility closer to their home in [facility]. Palliative care following pt. RD will continue to follow pt until d/c'ed. Thanks    Nutrition Diagnosis: n/a  Nutrition Intervention: n/a  Goal: n/a  Monitoring and Evaluation: n/a    No nutrition risk identified at this time.    RD will continue to monitor and will f/u for further nutrition assessment and intervention as appropriate    Education & Discharge Needs:   [] Nutrition related discharge needs addressed:     [] Supplements (on d/c instruction &/or coupons provided)  [] Education    [x]No nutrition related discharge needs at this time     Cultural, Moravian and ethnic food preferences identified    [x]None   [] Yes     Lauren Dexter RD

## 2017-01-11 NOTE — PROGRESS NOTES
Bedside and Verbal shift change report given to Castillo Patel RN (oncoming nurse) by Antolin Corona RN (offgoing nurse). Report included the following information SBAR, Kardex, Intake/Output, MAR, Accordion and Recent Results.

## 2017-01-11 NOTE — PROGRESS NOTES
Bedside and Verbal shift change report given to Haresh Renteria (oncoming nurse) by Ruben Riggins (offgoing nurse). Report included the following information SBAR, Kardex, Intake/Output, MAR, Recent Results and Cardiac Rhythm NSR 78.

## 2017-01-11 NOTE — PROGRESS NOTES
CM Note:  Met with pt's  and granddaughter for d/c planning. [facility] Family does not want her to go back there. Pt needs assistance with all ADL's. She was able to transfer to a w/c with max assist of 2. Pt has had home health in the past with [facility]. DME include a RW, cane and commode riser. Her emergency contact is her , Mina Rodas ([phone]). Pt will need ambulance transport at d/c. Pt has Rx coverage and gets her medications from [facility]. A referral was made in [facility] to [facility]. Will continue to follow. ANUJA Haider    Care Management Interventions  PCP Verified by CM:  Yes (PCP is Dr. General Mejia.)  Transition of Care Consult (CM Consult): SNF  Current Support Network: Lives with Spouse (Pt lives with her  in a 1 story house with 3 steps to enter.)  Confirm Follow Up Transport: Other (see comment) (Pt will need ambulance transport at d/c.)  Discharge Location  Discharge Placement: Skilled nursing facility

## 2017-01-11 NOTE — PROGRESS NOTES
Daily Progress Note: 1/11/2017  Lex Lopez MD    Assessment/Plan:   Deep venous thrombosis - New acute and extensive. empiric lovenox BID. Previously on coumadin, resume that, lovenox bridge.     ARF (acute renal failure) / Dehydration - POA, likely due to poor PO intake. Hydrate. Hold PO K and hold Bumex     Hypertrophic cardiomyopathy / Chronic diastolic heart failure - Monitor with symptoms, given need for hydration. Hold bumex, continue metoprolol, home norpace     Dementia / Cerebral atrophy - Severe. Previously required sitter. Continue seroquel and memantine.     Paroxysmal atrial fibrillation / CAD (coronary artery disease) - No acute symptoms. Continue BB. Not on ASA. Hold statin.     Glaucoma - Continue lumigan, alphagan and cospot     Hyperlipidemia LDL goal <70 - Given age and co morbidities, I would not test or treat hyperlipidemia     Weakness due to cerebrovascular accident - Fall precautions. Pureed and nector diet. She is at baseline, making no improvement since stroke. Family wants out of SNF, and into LTC      Goals of care, counseling/discussion - Consult palliative care. May consider comfort goals.     Recent UTI - Had completed keflex. Current UA unremarkable.  Monitor.           Problem List:  Problem List as of 1/11/2017  Date Reviewed: 1/9/2017          Codes Class Noted - Resolved    Deep venous thrombosis ([facility] ICD-10-CM: I82.409  ICD-9-CM: 453.40  1/9/2017 - Present        CAD (coronary artery disease) ICD-10-CM: I25.10  ICD-9-CM: 414.00  Unknown - Present        Dehydration ICD-10-CM: E86.0  ICD-9-CM: 276.51  1/9/2017 - Present        ARF (acute renal failure) ([facility] ICD-10-CM: N17.9  ICD-9-CM: 584.9  1/9/2017 - Present        Weakness due to cerebrovascular accident (CVA) ([facility] ICD-10-CM: I63.9, R53.1  ICD-9-CM: 434.91, 780.79  10/26/2016 - Present        Goals of care, counseling/discussion ICD-10-CM: Z71.89  ICD-9-CM: V65.49  10/26/2016 - Present Hyperlipidemia LDL goal <70 ICD-10-CM: E78.5  ICD-9-CM: 272.4  10/25/2016 - Present        Cerebral atrophy ICD-10-CM: G31.9  ICD-9-CM: 331.9  10/25/2016 - Present        Dementia ICD-10-CM: F03.90  ICD-9-CM: 294.20  10/25/2016 - Present        Glaucoma ICD-10-CM: H40.9  ICD-9-CM: 365.9  1/13/2014 - Present        Hypertrophic cardiomyopathy ([facility] ICD-10-CM: I42.2  ICD-9-CM: 425.18  6/1/2012 - Present        Paroxysmal atrial fibrillation ([facility] ICD-10-CM: I48.0  ICD-9-CM: 427.31  11/29/2011 - Present        Chronic diastolic heart failure (HCC) ICD-10-CM: I50.32  ICD-9-CM: 428.32  2/26/2011 - Present        RESOLVED: Carotid occlusion, left ICD-10-CM: I65.22  ICD-9-CM: 433.10  10/25/2016 - 1/9/2017        RESOLVED: Acute CVA (cerebrovascular accident) ([facility].) ICD-10-CM: I63.9  ICD-9-CM: 434.91  10/24/2016 - 1/9/2017        RESOLVED: Orthostatic hypotension ICD-10-CM: I95.1  ICD-9-CM: 458.0  10/28/2015 - 1/9/2017        RESOLVED: Acute embolic stroke ([facility] Q-32-NA: I63.9  ICD-9-CM: 434.11  6/26/2015 - 1/9/2017        RESOLVED: Dyslipidemia ICD-10-CM: E78.5  ICD-9-CM: 272.4  6/25/2015 - 1/9/2017        RESOLVED: Pulmonary nodule ICD-10-CM: R91.1  ICD-9-CM: 793.11  6/25/2015 - 1/9/2017        RESOLVED: Receptive aphasia ICD-10-CM: R47.01  ICD-9-CM: 784.3  6/25/2015 - 8/23/2015        RESOLVED: Dysarthria ICD-10-CM: R47.1  ICD-9-CM: 784.51  6/25/2015 - 8/23/2015        RESOLVED: Expressive aphasia ICD-10-CM: R47.01  ICD-9-CM: 784.3  6/25/2015 - 8/23/2015        RESOLVED: ADHF (acute decompensated heart failure) ([facility] ICD-10-CM: I50.9  ICD-9-CM: 428.0  6/24/2015 - 8/23/2015        RESOLVED: Cerebral ventriculomegaly due to brain atrophy ICD-10-CM: G31.9  ICD-9-CM: 331.9  6/24/2015 - 8/23/2015        RESOLVED: Left-sided visual neglect ICD-10-CM: R41.4  ICD-9-CM: 781.8  6/24/2015 - 8/23/2015        RESOLVED: Chest pain ICD-10-CM: R07.9  ICD-9-CM: 786.50  6/23/2015 - 8/23/2015        RESOLVED: RYAN (dyspnea on exertion) ICD-10-CM: R06.09  ICD-9-CM: 786.09  5/22/2015 - 8/23/2015        RESOLVED: Chest tightness ICD-10-CM: R07.89  ICD-9-CM: 786.59  5/22/2015 - 8/23/2015        RESOLVED: Anemia ICD-10-CM: D64.9  ICD-9-CM: 285.9  1/29/2014 - 1/9/2017        RESOLVED: Supratherapeutic INR ICD-10-CM: R79.1  ICD-9-CM: 790.92  1/13/2014 - 1/29/2014        RESOLVED: Melena ICD-10-CM: K92.1  ICD-9-CM: 578.1  1/13/2014 - 1/29/2014        RESOLVED: Macrocytic anemia ICD-10-CM: D53.9  ICD-9-CM: 281.9  1/13/2014 - 1/29/2014        RESOLVED: GI bleed ICD-10-CM: K92.2  ICD-9-CM: 578.9  1/13/2014 - 1/29/2014        RESOLVED: Cough ICD-10-CM: R05  ICD-9-CM: 786.2  1/13/2014 - 1/29/2014        RESOLVED: Diarrhea ICD-10-CM: R19.7  ICD-9-CM: 787.91  1/13/2014 - 1/29/2014        RESOLVED: Coffee ground emesis ICD-10-CM: K92.0  ICD-9-CM: 578.0  1/13/2014 - 1/29/2014        RESOLVED: Left flank pain ICD-10-CM: R10.9  ICD-9-CM: 789.09  1/13/2014 - 1/29/2014        RESOLVED: Weakness generalized ICD-10-CM: R53.1  ICD-9-CM: 780.79  1/13/2014 - 8/23/2015        RESOLVED: Hypoxia ICD-10-CM: R09.02  ICD-9-CM: 799.02  1/13/2014 - 1/29/2014        RESOLVED: CAP (community acquired pneumonia) ICD-10-CM: J18.9  ICD-9-CM: 567  1/13/2014 - 1/29/2014        RESOLVED: Pericardial effusion ICD-10-CM: I31.3  ICD-9-CM: 423.9  1/13/2014 - 1/29/2014        RESOLVED: Elevated blood pressure reading without diagnosis of hypertension ICD-10-CM: R03.0  ICD-9-CM: 796.2  1/13/2014 - 1/29/2014        RESOLVED: Mitral regurgitation ICD-10-CM: I34.0  ICD-9-CM: 424.0  10/22/2010 - 1/9/2017              Subjective:    68 y.o.  female who presented to the Emergency Department complaining of leg swelling. She provides no hx. Family is present. I have reviewed records. A few months ago she had a CVA, and has been persistently more immobile since. She has severe dementia and other medical issues. Family noted LLE swelling yesterday, and she had mild LLE discomfort today.  She is supposed to be on coumadin for Afib, but her INR is only 1.3. ER workup shows extensive LLE DVT and ARF. 1/10:  Dopplers revealed extensive DVT LLE. Pt poorly responsive at baseline since CVA - now seems worse. Lovenox until coumadin therapeutic.     :  Little change from yesterday and remains poorly responsive. She is stable for SCF. Review of Systems:   A comprehensive review of systems was negative except for that written in the HPI. Objective:   Physical Exam:     Visit Vitals    /72 (BP 1 Location: Left arm, BP Patient Position: At rest)    Pulse 78    Temp 98 °F (36.7 °C)    Resp 17    Ht 5' 4\" (1.626 m)    Wt 61.7 kg (136 lb)    SpO2 98%    Breastfeeding No    BMI 23.34 kg/m2      O2 Device: Room air    Temp (24hrs), Av.4 °F (36.9 °C), Min:97.8 °F (36.6 °C), Max:99 °F (37.2 °C)    01/10 1901 -  0700  In: 5998 [P.O.:120; I.V.:1265]  Out: -     07 - 01/10 190  In: 1320 [P.O.:120; I.V.:1200]  Out: -     General:   no distress, appears stated age. Head:  Normocephalic, without obvious abnormality, atraumatic. Eyes:  Conjunctivae/corneas clear. EOMs intact. Nose: Nares normal. Septum midline. Mucosa normal. No drainage or sinus tenderness. Throat: Lips, mucosa, and tongue moist..   Neck: Supple, symmetrical, trachea midline, no adenopathy, thyroid: no enlargement/tenderness/nodules and no JVD. Murmer radiates to carotids   Lungs:   Scattered rhonchi bilaterally. Chest wall:  No tenderness or deformity. Heart:  Regular rate and rhythm, S1, S2 normal, 2/6 murmur loudest axilla, apex and LSB. No click, rub or gallop. Abdomen:   Soft, apparently non-tender. Bowel sounds normal. No masses,  No organomegaly. Extremities: no cyanosis or RLE edema; LLE with 2+ edema. No calf tenderness or cords on right apparent. Skin: Skin color, texture, turgor normal. No rashes or lesions   Neurologic:  Alert and oriented X 0-1.          Data Review:     Bilat LE Dopplers 1/9/17:  CONCLUSION: Left lower extremity venous duplex positive for acute deep  venous thrombosis and thrombophlebitis throughout the left leg from  external iliac vein down through the calf. Right common femoral vein  is thrombus free.     Recent Days:  Recent Labs      01/09/17   1502   WBC  11.0   HGB  11.4*   HCT  36.3   PLT  156     Recent Labs      01/10/17   0623  01/09/17   1502   NA   --   141   K   --   4.3   CL   --   103   CO2   --   29   GLU   --   113*   BUN   --   25*   CREA   --   1.17*   CA   --   8.7   INR  1.3*  1.3*     No results for input(s): PH, PCO2, PO2, HCO3, FIO2 in the last 72 hours.     24 Hour Results:  Recent Results (from the past 24 hour(s))   GLUCOSE, POC    Collection Time: 01/10/17  7:19 AM   Result Value Ref Range    Glucose (POC) 86 65 - 100 mg/dL    Performed by Usha Chapman (PCT)    GLUCOSE, POC    Collection Time: 01/10/17 11:21 AM   Result Value Ref Range    Glucose (POC) 140 (H) 65 - 100 mg/dL    Performed by Usha Chapman (PCT)    GLUCOSE, POC    Collection Time: 01/10/17  4:12 PM   Result Value Ref Range    Glucose (POC) 173 (H) 65 - 100 mg/dL    Performed by Usha Chapman (PCT)    GLUCOSE, POC    Collection Time: 01/10/17  9:21 PM   Result Value Ref Range    Glucose (POC) 119 (H) 65 - 100 mg/dL    Performed by Marisabel Carter (PCT)        Medications reviewed  Current Facility-Administered Medications   Medication Dose Route Frequency    bimatoprost (LUMIGAN) 0.01 % ophthalmic drops 1 Drop  1 Drop Right Eye QHS    brimonidine (ALPHAGAN P) 0.1 % ophthalmic solution 1 Drop  1 Drop Right Eye BID    dorzolamide-timolol (COSOPT) 22.3-6.8 mg/mL ophthalmic solution 1 Drop  1 Drop Right Eye BID    memantine ER (NAMENDA XR) capsule 28 mg  28 mg Oral DAILY    QUEtiapine (SEROquel) tablet 12.5 mg  12.5 mg Oral QHS    naloxone (NARCAN) injection 0.4 mg  0.4 mg IntraVENous PRN    0.9% sodium chloride infusion  150 mL/hr IntraVENous CONTINUOUS    acetaminophen (TYLENOL) tablet 650 mg  650 mg Oral Q4H PRN    HYDROcodone-acetaminophen (NORCO) 5-325 mg per tablet 1 Tab  1 Tab Oral Q4H PRN    diphenhydrAMINE (BENADRYL) capsule 25 mg  25 mg Oral Q4H PRN    ondansetron (ZOFRAN) injection 4 mg  4 mg IntraVENous Q4H PRN    enoxaparin (LOVENOX) injection 60 mg  1 mg/kg SubCUTAneous Q12H    metoprolol tartrate (LOPRESSOR) tablet 75 mg  75 mg Oral BID    disopyramide phosphate (NORPACE) capsule 100 mg  100 mg Oral BID    Warfarin- Pharmacy to dose daily   Other Rx Dosing/Monitoring       Care Plan discussed with: Patient/Family and Nurse    Total time spent with patient: 30 minutes.     Newton Armstrong MD

## 2017-01-11 NOTE — CDMP QUERY
The following documentation is noted in the medical record:    * Patient with recent UTI, completed keflex prior to admission. * U/A WBC 20-50 WBC, negative bacteria, moderate leuk esterase, negative nitrites  * Urine Cx >100,000 Probable Streptococcus Species    Based on your medical judgement, could you please clarify the clinical significance of the above findings:    =>UTI  =>Urine colonized with Streptococcus  =>Other Explanation of clinical findings  =>Unable to Determine (no explanation of clinical findings)    Please clarify and document your clinical opinion in the progress notes and discharge summary including the definitive and/or presumptive diagnosis, (suspected or probable), related to the above clinical findings. Please include clinical findings supporting your diagnosis.     Stu Mckeon, [facility], [email or web address]

## 2017-01-12 LAB
ALBUMIN SERPL BCP-MCNC: 2.1 G/DL (ref 3.5–5)
ALBUMIN/GLOB SERPL: 0.6 {RATIO} (ref 1.1–2.2)
ALP SERPL-CCNC: 83 U/L (ref 45–117)
ALT SERPL-CCNC: 21 U/L (ref 12–78)
ANION GAP BLD CALC-SCNC: 7 MMOL/L (ref 5–15)
AST SERPL W P-5'-P-CCNC: 18 U/L (ref 15–37)
BACTERIA SPEC CULT: NORMAL
BASOPHILS # BLD AUTO: 0 K/UL (ref 0–0.1)
BASOPHILS # BLD: 1 % (ref 0–1)
BILIRUB SERPL-MCNC: 0.4 MG/DL (ref 0.2–1)
BUN SERPL-MCNC: 12 MG/DL (ref 6–20)
BUN/CREAT SERPL: 16 (ref 12–20)
CALCIUM SERPL-MCNC: 7.6 MG/DL (ref 8.5–10.1)
CC UR VC: NORMAL
CHLORIDE SERPL-SCNC: 110 MMOL/L (ref 97–108)
CO2 SERPL-SCNC: 23 MMOL/L (ref 21–32)
CREAT SERPL-MCNC: 0.76 MG/DL (ref 0.55–1.02)
EOSINOPHIL # BLD: 0.4 K/UL (ref 0–0.4)
EOSINOPHIL NFR BLD: 4 % (ref 0–7)
ERYTHROCYTE [DISTWIDTH] IN BLOOD BY AUTOMATED COUNT: 12.7 % (ref 11.5–14.5)
GLOBULIN SER CALC-MCNC: 3.8 G/DL (ref 2–4)
GLUCOSE BLD STRIP.AUTO-MCNC: 100 MG/DL (ref 65–100)
GLUCOSE BLD STRIP.AUTO-MCNC: 103 MG/DL (ref 65–100)
GLUCOSE BLD STRIP.AUTO-MCNC: 125 MG/DL (ref 65–100)
GLUCOSE BLD STRIP.AUTO-MCNC: 125 MG/DL (ref 65–100)
GLUCOSE SERPL-MCNC: 92 MG/DL (ref 65–100)
HCT VFR BLD AUTO: 31.7 % (ref 35–47)
HGB BLD-MCNC: 9.9 G/DL (ref 11.5–16)
INR PPP: 2.4 (ref 0.9–1.1)
LYMPHOCYTES # BLD AUTO: 22 % (ref 12–49)
LYMPHOCYTES # BLD: 1.8 K/UL (ref 0.8–3.5)
MCH RBC QN AUTO: 31.2 PG (ref 26–34)
MCHC RBC AUTO-ENTMCNC: 31.2 G/DL (ref 30–36.5)
MCV RBC AUTO: 100 FL (ref 80–99)
MONOCYTES # BLD: 0.7 K/UL (ref 0–1)
MONOCYTES NFR BLD AUTO: 8 % (ref 5–13)
NEUTS SEG # BLD: 5.3 K/UL (ref 1.8–8)
NEUTS SEG NFR BLD AUTO: 65 % (ref 32–75)
PLATELET # BLD AUTO: 197 K/UL (ref 150–400)
POTASSIUM SERPL-SCNC: 3.8 MMOL/L (ref 3.5–5.1)
PROT SERPL-MCNC: 5.9 G/DL (ref 6.4–8.2)
PROTHROMBIN TIME: 24.7 SEC (ref 9–11.1)
RBC # BLD AUTO: 3.17 M/UL (ref 3.8–5.2)
SERVICE CMNT-IMP: ABNORMAL
SERVICE CMNT-IMP: NORMAL
SERVICE CMNT-IMP: NORMAL
SODIUM SERPL-SCNC: 140 MMOL/L (ref 136–145)
WBC # BLD AUTO: 8.1 K/UL (ref 3.6–11)

## 2017-01-12 PROCEDURE — 77030013140 HC MSK NEB VYRM -A

## 2017-01-12 PROCEDURE — 85610 PROTHROMBIN TIME: CPT | Performed by: INTERNAL MEDICINE

## 2017-01-12 PROCEDURE — 36415 COLL VENOUS BLD VENIPUNCTURE: CPT | Performed by: INTERNAL MEDICINE

## 2017-01-12 PROCEDURE — 74011000250 HC RX REV CODE- 250: Performed by: FAMILY MEDICINE

## 2017-01-12 PROCEDURE — 80053 COMPREHEN METABOLIC PANEL: CPT | Performed by: FAMILY MEDICINE

## 2017-01-12 PROCEDURE — 74011250636 HC RX REV CODE- 250/636: Performed by: INTERNAL MEDICINE

## 2017-01-12 PROCEDURE — 85025 COMPLETE CBC W/AUTO DIFF WBC: CPT | Performed by: FAMILY MEDICINE

## 2017-01-12 PROCEDURE — 82962 GLUCOSE BLOOD TEST: CPT

## 2017-01-12 PROCEDURE — 65660000000 HC RM CCU STEPDOWN

## 2017-01-12 PROCEDURE — 74011250637 HC RX REV CODE- 250/637: Performed by: INTERNAL MEDICINE

## 2017-01-12 PROCEDURE — 94640 AIRWAY INHALATION TREATMENT: CPT

## 2017-01-12 RX ORDER — ALBUTEROL SULFATE 1.25 MG/3ML
1.25 SOLUTION RESPIRATORY (INHALATION)
Status: DISCONTINUED | OUTPATIENT
Start: 2017-01-12 | End: 2017-01-22 | Stop reason: HOSPADM

## 2017-01-12 RX ORDER — WARFARIN 2 MG/1
4 TABLET ORAL ONCE
Status: COMPLETED | OUTPATIENT
Start: 2017-01-12 | End: 2017-01-12

## 2017-01-12 RX ADMIN — WARFARIN SODIUM 4 MG: 2 TABLET ORAL at 18:50

## 2017-01-12 RX ADMIN — DISOPYRAMIDE PHOSPHATE 100 MG: 100 CAPSULE ORAL at 08:59

## 2017-01-12 RX ADMIN — DORZOLAMIDE HYDROCHLORIDE AND TIMOLOL MALEATE 1 DROP: 20; 5 SOLUTION/ DROPS OPHTHALMIC at 08:37

## 2017-01-12 RX ADMIN — QUETIAPINE FUMARATE 12.5 MG: 25 TABLET, FILM COATED ORAL at 22:39

## 2017-01-12 RX ADMIN — METOPROLOL TARTRATE 75 MG: 25 TABLET ORAL at 08:36

## 2017-01-12 RX ADMIN — ENOXAPARIN SODIUM 60 MG: 60 INJECTION SUBCUTANEOUS at 20:47

## 2017-01-12 RX ADMIN — METOPROLOL TARTRATE 75 MG: 25 TABLET ORAL at 18:57

## 2017-01-12 RX ADMIN — ENOXAPARIN SODIUM 60 MG: 60 INJECTION SUBCUTANEOUS at 08:36

## 2017-01-12 RX ADMIN — MEMANTINE HYDROCHLORIDE 28 MG: 28 CAPSULE, EXTENDED RELEASE ORAL at 08:36

## 2017-01-12 RX ADMIN — DISOPYRAMIDE PHOSPHATE 100 MG: 100 CAPSULE ORAL at 22:40

## 2017-01-12 RX ADMIN — BRIMONIDINE TARTRATE 1 DROP: 1 SOLUTION/ DROPS OPHTHALMIC at 08:36

## 2017-01-12 RX ADMIN — BIMATOPROST 1 DROP: 0.1 SOLUTION/ DROPS OPHTHALMIC at 22:37

## 2017-01-12 RX ADMIN — ALBUTEROL SULFATE 1.25 MG: 1.25 SOLUTION RESPIRATORY (INHALATION) at 17:08

## 2017-01-12 RX ADMIN — BRIMONIDINE TARTRATE 1 DROP: 1 SOLUTION/ DROPS OPHTHALMIC at 22:37

## 2017-01-12 RX ADMIN — DORZOLAMIDE HYDROCHLORIDE AND TIMOLOL MALEATE 1 DROP: 20; 5 SOLUTION/ DROPS OPHTHALMIC at 22:37

## 2017-01-12 NOTE — PROGRESS NOTES
Bedside and Verbal shift change report given to Keon Rivers RN (oncoming nurse) by Bebeto Yarbrough RN (offgoing nurse). Report included the following information SBAR, Kardex, Intake/Output, MAR, Accordion and Recent Results.

## 2017-01-12 NOTE — PROGRESS NOTES
[facility] Pharm[facility] Dosing Services: 1/12/2017    Consult for Warfarin Dosing by Pharmacy by Dr. Aleisha Leal provided for this 68 y.o. female , for indication of Venous Thrombosis. Day of Therapy continued from home 5mg Monday, Tuesday, Wednesday ,Thursday, and Friday. 6mg on Saturday and Sunday  Dose to achieve an INR goal of 2-3     Order entered for Warfarin 4 (mg) ordered to be given today at 18:00. Significant drug interactions: Lovenox 60mg every 12 hours (INR Therapeutic x1, Since acute DVT would allow 2 days of overlap)    Previous dose given 4 mg yesterday   PT/INR Lab Results   Component Value Date/Time    INR 2.4 01/12/2017 05:10 AM    INR (POC) 1.0 10/24/2016 06:33 PM    INR POC 2.2 05/28/2015 01:01 PM      Platelets Lab Results   Component Value Date/Time    PLATELET 589 09/57/0221 05:10 AM      H/H Lab Results   Component Value Date/Time    HGB 9.9 01/12/2017 05:10 AM        Pharmacy to follow daily and will provide subsequent Warfarin dosing based on clinical status.   [address] [facility])  Contact information [phone]

## 2017-01-12 NOTE — ROUTINE PROCESS
Bedside and Verbal shift change report given to [address] [place] [address] (oncoming nurse) by Sheron Hernandez (offgoing nurse). Report included the following information SBAR, Kardex, Intake/Output, MAR, Accordion and Med Rec Status.

## 2017-01-12 NOTE — PROGRESS NOTES
Daily Progress Note: 1/12/2017  Iam Sheppard MD    Assessment/Plan:   Deep venous thrombosis - New acute and extensive. empiric lovenox BID. Previously on coumadin, resume that, lovenox bridge.     ARF (acute renal failure) / Dehydration - POA, likely due to poor PO intake. Hydrate. Hold PO K and hold Bumex     Hypertrophic cardiomyopathy / Chronic diastolic heart failure - Monitor with symptoms, given need for hydration. Hold bumex, continue metoprolol, home norpace     Dementia / Cerebral atrophy - Severe. Previously required sitter. Continue seroquel and memantine.     Paroxysmal atrial fibrillation / CAD (coronary artery disease) - No acute symptoms. Continue BB. Not on ASA. Hold statin.     Glaucoma - Continue lumigan, alphagan and cospot     Hyperlipidemia LDL goal <70 - Given age and co morbidities, I would not test or treat hyperlipidemia     Weakness due to cerebrovascular accident - Fall precautions. Pureed and nector diet. She is at baseline, making no improvement since stroke. Family wants out of SNF, and into LTC      Goals of care, counseling/discussion - Consult palliative care. May consider comfort goals.     Recent UTI - Had completed keflex. Current UA unremarkable.  Monitor.           Problem List:  Problem List as of 1/12/2017  Date Reviewed: 1/9/2017          Codes Class Noted - Resolved    Deep venous thrombosis ([facility] ICD-10-CM: I82.409  ICD-9-CM: 453.40  1/9/2017 - Present        CAD (coronary artery disease) ICD-10-CM: I25.10  ICD-9-CM: 414.00  Unknown - Present        Dehydration ICD-10-CM: E86.0  ICD-9-CM: 276.51  1/9/2017 - Present        ARF (acute renal failure) ([facility] ICD-10-CM: N17.9  ICD-9-CM: 584.9  1/9/2017 - Present        Weakness due to cerebrovascular accident (CVA) ([facility] ICD-10-CM: I63.9, R53.1  ICD-9-CM: 434.91, 780.79  10/26/2016 - Present        Goals of care, counseling/discussion ICD-10-CM: Z71.89  ICD-9-CM: V65.49  10/26/2016 - Present Hyperlipidemia LDL goal <70 ICD-10-CM: E78.5  ICD-9-CM: 272.4  10/25/2016 - Present        Cerebral atrophy ICD-10-CM: G31.9  ICD-9-CM: 331.9  10/25/2016 - Present        Dementia ICD-10-CM: F03.90  ICD-9-CM: 294.20  10/25/2016 - Present        Glaucoma ICD-10-CM: H40.9  ICD-9-CM: 365.9  1/13/2014 - Present        Hypertrophic cardiomyopathy ([facility] ICD-10-CM: I42.2  ICD-9-CM: 425.18  6/1/2012 - Present        Paroxysmal atrial fibrillation ([facility] ICD-10-CM: I48.0  ICD-9-CM: 427.31  11/29/2011 - Present        Chronic diastolic heart failure (HCC) ICD-10-CM: I50.32  ICD-9-CM: 428.32  2/26/2011 - Present        RESOLVED: Carotid occlusion, left ICD-10-CM: I65.22  ICD-9-CM: 433.10  10/25/2016 - 1/9/2017        RESOLVED: Acute CVA (cerebrovascular accident) ([facility] ICD-10-CM: I63.9  ICD-9-CM: 434.91  10/24/2016 - 1/9/2017        RESOLVED: Orthostatic hypotension ICD-10-CM: I95.1  ICD-9-CM: 458.0  10/28/2015 - 1/9/2017        RESOLVED: Acute embolic stroke ([facility] VTA-04-GZ: I63.9  ICD-9-CM: 434.11  6/26/2015 - 1/9/2017        RESOLVED: Dyslipidemia ICD-10-CM: E78.5  ICD-9-CM: 272.4  6/25/2015 - 1/9/2017        RESOLVED: Pulmonary nodule ICD-10-CM: R91.1  ICD-9-CM: 793.11  6/25/2015 - 1/9/2017        RESOLVED: Receptive aphasia ICD-10-CM: R47.01  ICD-9-CM: 784.3  6/25/2015 - 8/23/2015        RESOLVED: Dysarthria ICD-10-CM: R47.1  ICD-9-CM: 784.51  6/25/2015 - 8/23/2015        RESOLVED: Expressive aphasia ICD-10-CM: R47.01  ICD-9-CM: 784.3  6/25/2015 - 8/23/2015        RESOLVED: ADHF (acute decompensated heart failure) ([facility] ICD-10-CM: I50.9  ICD-9-CM: 428.0  6/24/2015 - 8/23/2015        RESOLVED: Cerebral ventriculomegaly due to brain atrophy ICD-10-CM: G31.9  ICD-9-CM: 331.9  6/24/2015 - 8/23/2015        RESOLVED: Left-sided visual neglect ICD-10-CM: R41.4  ICD-9-CM: 781.8  6/24/2015 - 8/23/2015        RESOLVED: Chest pain ICD-10-CM: R07.9  ICD-9-CM: 786.50  6/23/2015 - 8/23/2015        RESOLVED: RYAN (dyspnea on exertion) ICD-10-CM: R06.09  ICD-9-CM: 786.09  5/22/2015 - 8/23/2015        RESOLVED: Chest tightness ICD-10-CM: R07.89  ICD-9-CM: 786.59  5/22/2015 - 8/23/2015        RESOLVED: Anemia ICD-10-CM: D64.9  ICD-9-CM: 285.9  1/29/2014 - 1/9/2017        RESOLVED: Supratherapeutic INR ICD-10-CM: R79.1  ICD-9-CM: 790.92  1/13/2014 - 1/29/2014        RESOLVED: Melena ICD-10-CM: K92.1  ICD-9-CM: 578.1  1/13/2014 - 1/29/2014        RESOLVED: Macrocytic anemia ICD-10-CM: D53.9  ICD-9-CM: 281.9  1/13/2014 - 1/29/2014        RESOLVED: GI bleed ICD-10-CM: K92.2  ICD-9-CM: 578.9  1/13/2014 - 1/29/2014        RESOLVED: Cough ICD-10-CM: R05  ICD-9-CM: 786.2  1/13/2014 - 1/29/2014        RESOLVED: Diarrhea ICD-10-CM: R19.7  ICD-9-CM: 787.91  1/13/2014 - 1/29/2014        RESOLVED: Coffee ground emesis ICD-10-CM: K92.0  ICD-9-CM: 578.0  1/13/2014 - 1/29/2014        RESOLVED: Left flank pain ICD-10-CM: R10.9  ICD-9-CM: 789.09  1/13/2014 - 1/29/2014        RESOLVED: Weakness generalized ICD-10-CM: R53.1  ICD-9-CM: 780.79  1/13/2014 - 8/23/2015        RESOLVED: Hypoxia ICD-10-CM: R09.02  ICD-9-CM: 799.02  1/13/2014 - 1/29/2014        RESOLVED: CAP (community acquired pneumonia) ICD-10-CM: J18.9  ICD-9-CM: 215  1/13/2014 - 1/29/2014        RESOLVED: Pericardial effusion ICD-10-CM: I31.3  ICD-9-CM: 423.9  1/13/2014 - 1/29/2014        RESOLVED: Elevated blood pressure reading without diagnosis of hypertension ICD-10-CM: R03.0  ICD-9-CM: 796.2  1/13/2014 - 1/29/2014        RESOLVED: Mitral regurgitation ICD-10-CM: I34.0  ICD-9-CM: 424.0  10/22/2010 - 1/9/2017              Subjective:    68 y.o.  female who presented to the Emergency Department complaining of leg swelling. She provides no hx. Family is present. I have reviewed records. A few months ago she had a CVA, and has been persistently more immobile since. She has severe dementia and other medical issues. Family noted LLE swelling yesterday, and she had mild LLE discomfort today.  She is supposed to be on coumadin for Afib, but her INR is only 1.3. ER workup shows extensive LLE DVT and ARF. 1/10:  Dopplers revealed extensive DVT LLE. Pt poorly responsive at baseline since CVA - now seems worse. Lovenox until coumadin therapeutic.     :  Little change from yesterday and remains poorly responsive. She is stable for [facility].     : More alert today. Nursing reports she was more alert yesterday and took diet well. She is stable for [facility]. Review of Systems:   A comprehensive review of systems was negative except for that written in the HPI. Objective:   Physical Exam:     Visit Vitals    /74 (BP 1 Location: Left arm, BP Patient Position: At rest)    Pulse 81    Temp 97.9 °F (36.6 °C)    Resp 18    Ht 5' 4\" (1.626 m)    Wt 61.7 kg (136 lb)    SpO2 92%    Breastfeeding No    BMI 23.34 kg/m2      O2 Device: Room air    Temp (24hrs), Av.4 °F (36.9 °C), Min:97.8 °F (36.6 °C), Max:99.5 °F (37.5 °C)        01/10 07 -  1900  In: 3832 [P.O.:240; I.V.:2465]  Out: -     General:   no distress, appears stated age. Head:  Normocephalic, without obvious abnormality, atraumatic. Eyes:  Conjunctivae/corneas clear. EOMs intact. Nose: Nares normal. Septum midline. Mucosa normal. No drainage or sinus tenderness. Throat: Lips, mucosa, and tongue moist..   Neck: Supple, symmetrical, trachea midline, no adenopathy, thyroid: no enlargement/tenderness/nodules and no JVD. Murmer radiates to carotids   Lungs:   Scattered rhonchi bilaterally. Chest wall:  No tenderness or deformity. Heart:  Regular rate and rhythm, S1, S2 normal, 2/6 murmur loudest axilla, apex and LSB. No click, rub or gallop. Abdomen:   Soft, apparently non-tender. Bowel sounds normal. No masses,  No organomegaly. Extremities: no cyanosis or RLE edema; LLE with 2+ edema. No calf tenderness or cords on right apparent.      Skin: Skin color, texture, turgor normal. No rashes or lesions   Neurologic: Alert and oriented X 0-1. Data Review:     Jong LE Dopplers 1/9/17:  CONCLUSION: Left lower extremity venous duplex positive for acute deep  venous thrombosis and thrombophlebitis throughout the left leg from  external iliac vein down through the calf. Right common femoral vein  is thrombus free.     Recent Days:  Recent Labs      01/12/17   0510  01/09/17   1502   WBC  8.1  11.0   HGB  9.9*  11.4*   HCT  31.7*  36.3   PLT  197  156     Recent Labs      01/12/17   0510  01/11/17   0553  01/10/17   0623  01/09/17   1502   NA  140   --    --   141   K  3.8   --    --   4.3   CL  110*   --    --   103   CO2  23   --    --   29   GLU  92   --    --   113*   BUN  12   --    --   25*   CREA  0.76   --    --   1.17*   CA  7.6*   --    --   8.7   ALB  2.1*   --    --    --    TBILI  0.4   --    --    --    SGOT  18   --    --    --    ALT  21   --    --    --    INR  2.4*  1.8*  1.3*  1.3*     No results for input(s): PH, PCO2, PO2, HCO3, FIO2 in the last 72 hours.     24 Hour Results:  Recent Results (from the past 24 hour(s))   GLUCOSE, POC    Collection Time: 01/11/17  7:42 AM   Result Value Ref Range    Glucose (POC) 82 65 - 100 mg/dL    Performed by Wilmore Bianca (PCT)    GLUCOSE, POC    Collection Time: 01/11/17 11:12 AM   Result Value Ref Range    Glucose (POC) 88 65 - 100 mg/dL    Performed by Charlee Valenzuela (PCT)    GLUCOSE, POC    Collection Time: 01/11/17  4:23 PM   Result Value Ref Range    Glucose (POC) 126 (H) 65 - 100 mg/dL    Performed by Charlee Valenzuela (PCT)    GLUCOSE, POC    Collection Time: 01/11/17  9:58 PM   Result Value Ref Range    Glucose (POC) 131 (H) 65 - 100 mg/dL    Performed by Diego Jimenez (PCT)    PROTHROMBIN TIME + INR    Collection Time: 01/12/17  5:10 AM   Result Value Ref Range    INR 2.4 (H) 0.9 - 1.1      Prothrombin time 24.7 (H) 9.0 - 11.1 sec   CBC WITH AUTOMATED DIFF    Collection Time: 01/12/17  5:10 AM   Result Value Ref Range    WBC 8.1 3.6 - 11.0 K/uL    RBC 3.17 (L) 3.80 - 5.20 M/uL    HGB 9.9 (L) 11.5 - 16.0 g/dL    HCT 31.7 (L) 35.0 - 47.0 %    .0 (H) 80.0 - 99.0 FL    MCH 31.2 26.0 - 34.0 PG    MCHC 31.2 30.0 - 36.5 g/dL    RDW 12.7 11.5 - 14.5 %    PLATELET 121 436 - 307 K/uL    NEUTROPHILS 65 32 - 75 %    LYMPHOCYTES 22 12 - 49 %    MONOCYTES 8 5 - 13 %    EOSINOPHILS 4 0 - 7 %    BASOPHILS 1 0 - 1 %    ABS. NEUTROPHILS 5.3 1.8 - 8.0 K/UL    ABS. LYMPHOCYTES 1.8 0.8 - 3.5 K/UL    ABS. MONOCYTES 0.7 0.0 - 1.0 K/UL    ABS. EOSINOPHILS 0.4 0.0 - 0.4 K/UL    ABS. BASOPHILS 0.0 0.0 - 0.1 K/UL   METABOLIC PANEL, COMPREHENSIVE    Collection Time: 01/12/17  5:10 AM   Result Value Ref Range    Sodium 140 136 - 145 mmol/L    Potassium 3.8 3.5 - 5.1 mmol/L    Chloride 110 (H) 97 - 108 mmol/L    CO2 23 21 - 32 mmol/L    Anion gap 7 5 - 15 mmol/L    Glucose 92 65 - 100 mg/dL    BUN 12 6 - 20 MG/DL    Creatinine 0.76 0.55 - 1.02 MG/DL    BUN/Creatinine ratio 16 12 - 20      GFR est AA >60 >60 ml/min/1.73m2    GFR est non-AA >60 >60 ml/min/1.73m2    Calcium 7.6 (L) 8.5 - 10.1 MG/DL    Bilirubin, total 0.4 0.2 - 1.0 MG/DL    ALT 21 12 - 78 U/L    AST 18 15 - 37 U/L    Alk.  phosphatase 83 45 - 117 U/L    Protein, total 5.9 (L) 6.4 - 8.2 g/dL    Albumin 2.1 (L) 3.5 - 5.0 g/dL    Globulin 3.8 2.0 - 4.0 g/dL    A-G Ratio 0.6 (L) 1.1 - 2.2         Medications reviewed  Current Facility-Administered Medications   Medication Dose Route Frequency    bimatoprost (LUMIGAN) 0.01 % ophthalmic drops 1 Drop  1 Drop Right Eye QHS    brimonidine (ALPHAGAN P) 0.1 % ophthalmic solution 1 Drop  1 Drop Right Eye BID    dorzolamide-timolol (COSOPT) 22.3-6.8 mg/mL ophthalmic solution 1 Drop  1 Drop Right Eye BID    memantine ER (NAMENDA XR) capsule 28 mg  28 mg Oral DAILY    QUEtiapine (SEROquel) tablet 12.5 mg  12.5 mg Oral QHS    naloxone (NARCAN) injection 0.4 mg  0.4 mg IntraVENous PRN    0.9% sodium chloride infusion  50 mL/hr IntraVENous CONTINUOUS    acetaminophen (TYLENOL) tablet 650 mg  650 mg Oral Q4H PRN    HYDROcodone-acetaminophen (NORCO) 5-325 mg per tablet 1 Tab  1 Tab Oral Q4H PRN    diphenhydrAMINE (BENADRYL) capsule 25 mg  25 mg Oral Q4H PRN    ondansetron (ZOFRAN) injection 4 mg  4 mg IntraVENous Q4H PRN    enoxaparin (LOVENOX) injection 60 mg  1 mg/kg SubCUTAneous Q12H    metoprolol tartrate (LOPRESSOR) tablet 75 mg  75 mg Oral BID    disopyramide phosphate (NORPACE) capsule 100 mg  100 mg Oral BID    Warfarin- Pharmacy to dose daily   Other Rx Dosing/Monitoring       Care Plan discussed with: Patient and Nurse    Total time spent with patient: 30 minutes.     Jeanette Ovalles MD

## 2017-01-12 NOTE — INTERDISCIPLINARY ROUNDS
Interdisciplinary team rounds were held 1/12/2017 with the following team members:Care Management, Palliative, Nursing, Nutrition, Pharmacy, Physical Therapy, Clinical Coordinator and Unit Nurse Manager. Plan of care discussed. See clinical pathway and/or care plan for interventions and desired outcomes.     Anticipated discharge: pending placement

## 2017-01-13 LAB
GLUCOSE BLD STRIP.AUTO-MCNC: 107 MG/DL (ref 65–100)
GLUCOSE BLD STRIP.AUTO-MCNC: 123 MG/DL (ref 65–100)
GLUCOSE BLD STRIP.AUTO-MCNC: 133 MG/DL (ref 65–100)
GLUCOSE BLD STRIP.AUTO-MCNC: 90 MG/DL (ref 65–100)
INR PPP: 2 (ref 0.9–1.1)
PROTHROMBIN TIME: 20.7 SEC (ref 9–11.1)
SERVICE CMNT-IMP: ABNORMAL
SERVICE CMNT-IMP: NORMAL

## 2017-01-13 PROCEDURE — 74011250637 HC RX REV CODE- 250/637: Performed by: INTERNAL MEDICINE

## 2017-01-13 PROCEDURE — 36415 COLL VENOUS BLD VENIPUNCTURE: CPT | Performed by: INTERNAL MEDICINE

## 2017-01-13 PROCEDURE — 65660000000 HC RM CCU STEPDOWN

## 2017-01-13 PROCEDURE — 85610 PROTHROMBIN TIME: CPT | Performed by: INTERNAL MEDICINE

## 2017-01-13 PROCEDURE — 74011250636 HC RX REV CODE- 250/636: Performed by: INTERNAL MEDICINE

## 2017-01-13 PROCEDURE — 82962 GLUCOSE BLOOD TEST: CPT

## 2017-01-13 RX ORDER — WARFARIN SODIUM 5 MG/1
5 TABLET ORAL ONCE
Status: COMPLETED | OUTPATIENT
Start: 2017-01-13 | End: 2017-01-13

## 2017-01-13 RX ORDER — FACIAL-BODY WIPES
10 EACH TOPICAL DAILY PRN
Status: DISCONTINUED | OUTPATIENT
Start: 2017-01-13 | End: 2017-01-22 | Stop reason: HOSPADM

## 2017-01-13 RX ADMIN — BRIMONIDINE TARTRATE 1 DROP: 1 SOLUTION/ DROPS OPHTHALMIC at 21:40

## 2017-01-13 RX ADMIN — MEMANTINE HYDROCHLORIDE 28 MG: 28 CAPSULE, EXTENDED RELEASE ORAL at 09:37

## 2017-01-13 RX ADMIN — WARFARIN SODIUM 5 MG: 5 TABLET ORAL at 17:37

## 2017-01-13 RX ADMIN — QUETIAPINE FUMARATE 12.5 MG: 25 TABLET, FILM COATED ORAL at 21:41

## 2017-01-13 RX ADMIN — METOPROLOL TARTRATE 75 MG: 25 TABLET ORAL at 09:37

## 2017-01-13 RX ADMIN — DORZOLAMIDE HYDROCHLORIDE AND TIMOLOL MALEATE 1 DROP: 20; 5 SOLUTION/ DROPS OPHTHALMIC at 11:32

## 2017-01-13 RX ADMIN — DISOPYRAMIDE PHOSPHATE 100 MG: 100 CAPSULE ORAL at 09:37

## 2017-01-13 RX ADMIN — ENOXAPARIN SODIUM 60 MG: 60 INJECTION SUBCUTANEOUS at 09:37

## 2017-01-13 RX ADMIN — BRIMONIDINE TARTRATE 1 DROP: 1 SOLUTION/ DROPS OPHTHALMIC at 11:32

## 2017-01-13 RX ADMIN — DISOPYRAMIDE PHOSPHATE 100 MG: 100 CAPSULE ORAL at 21:40

## 2017-01-13 RX ADMIN — METOPROLOL TARTRATE 75 MG: 25 TABLET ORAL at 21:41

## 2017-01-13 RX ADMIN — BIMATOPROST 1 DROP: 0.1 SOLUTION/ DROPS OPHTHALMIC at 21:40

## 2017-01-13 RX ADMIN — DORZOLAMIDE HYDROCHLORIDE AND TIMOLOL MALEATE 1 DROP: 20; 5 SOLUTION/ DROPS OPHTHALMIC at 21:40

## 2017-01-13 NOTE — PROGRESS NOTES
1044:  [id number] [employer] maintained their denial.  I called pt's  to ask for more choices. He stated will talk with his granddaughter and get back to me. ANUJA Haider    CM Note:  Pt stable for d/c. I called [address] and spoke to [place]. She stated they did not accept pt because of likelihood of LTC and there are no beds available. Pt's current insurance ends on 1/31 when she will no longer have [employer] and requested them to reconsider pt for ST rehab. Awaiting reply.   ANUJA Haider

## 2017-01-13 NOTE — ROUTINE PROCESS
Bedside and Verbal shift change report given to Diamante Miranda RN (oncoming nurse) by Sheba Langford RN (offgoing nurse). Report included the following information SBAR, Kardex, ED Summary, Intake/Output, MAR and Recent Results.

## 2017-01-13 NOTE — INTERDISCIPLINARY ROUNDS
Interdisciplinary team rounds were held 1/13/2017 with the following team members:Care Management, Nursing, Nutrition, Occupational Therapy, Pharmacy, Physical Therapy and Physician. Plan of care discussed. See clinical pathway and/or care plan for interventions and desired outcomes.     Anticipated date of discharge 1-13-17

## 2017-01-13 NOTE — PROGRESS NOTES
Daily Progress Note: 1/13/2017  Iam Sheppard MD    Assessment/Plan:   Deep venous thrombosis - New acute and extensive. empiric lovenox BID. Previously on coumadin, resume that, lovenox bridge.     ARF (acute renal failure) / Dehydration - POA, likely due to poor PO intake. Hydrate. Hold PO K and hold Bumex     Hypertrophic cardiomyopathy / Chronic diastolic heart failure - Monitor with symptoms, given need for hydration. Hold bumex, continue metoprolol, home norpace     Dementia / Cerebral atrophy - Severe. Previously required sitter. Continue seroquel and memantine.     Paroxysmal atrial fibrillation / CAD (coronary artery disease) - No acute symptoms. Continue BB. Not on ASA. Hold statin.     Glaucoma - Continue lumigan, alphagan and cospot     Hyperlipidemia LDL goal <70 - Given age and co morbidities, I would not test or treat hyperlipidemia     Weakness due to cerebrovascular accident - Fall precautions. Pureed and nector diet. She is at baseline, making no improvement since stroke. Family wants out of SNF, and into LTC      Goals of care, counseling/discussion - Consult palliative care. May consider comfort goals.     Recent UTI - Had completed keflex. Current UA unremarkable.  Monitor.           Problem List:  Problem List as of 1/13/2017  Date Reviewed: 1/9/2017          Codes Class Noted - Resolved    Deep venous thrombosis ([facility] ICD-10-CM: I82.409  ICD-9-CM: 453.40  1/9/2017 - Present        CAD (coronary artery disease) ICD-10-CM: I25.10  ICD-9-CM: 414.00  Unknown - Present        Dehydration ICD-10-CM: E86.0  ICD-9-CM: 276.51  1/9/2017 - Present        ARF (acute renal failure) ([facility] ICD-10-CM: N17.9  ICD-9-CM: 584.9  1/9/2017 - Present        Weakness due to cerebrovascular accident (CVA) ([facility] ICD-10-CM: I63.9, R53.1  ICD-9-CM: 434.91, 780.79  10/26/2016 - Present        Goals of care, counseling/discussion ICD-10-CM: Z71.89  ICD-9-CM: V65.49  10/26/2016 - Present Hyperlipidemia LDL goal <70 ICD-10-CM: E78.5  ICD-9-CM: 272.4  10/25/2016 - Present        Cerebral atrophy ICD-10-CM: G31.9  ICD-9-CM: 331.9  10/25/2016 - Present        Dementia ICD-10-CM: F03.90  ICD-9-CM: 294.20  10/25/2016 - Present        Glaucoma ICD-10-CM: H40.9  ICD-9-CM: 365.9  1/13/2014 - Present        Hypertrophic cardiomyopathy ([facility] ICD-10-CM: I42.2  ICD-9-CM: 425.18  6/1/2012 - Present        Paroxysmal atrial fibrillation ([facility].) ICD-10-CM: I48.0  ICD-9-CM: 427.31  11/29/2011 - Present        Chronic diastolic heart failure (HCC) ICD-10-CM: I50.32  ICD-9-CM: 428.32  2/26/2011 - Present        RESOLVED: Carotid occlusion, left ICD-10-CM: I65.22  ICD-9-CM: 433.10  10/25/2016 - 1/9/2017        RESOLVED: Acute CVA (cerebrovascular accident) ([facility].) ICD-10-CM: I63.9  ICD-9-CM: 434.91  10/24/2016 - 1/9/2017        RESOLVED: Orthostatic hypotension ICD-10-CM: I95.1  ICD-9-CM: 458.0  10/28/2015 - 1/9/2017        RESOLVED: Acute embolic stroke ([facility].) [id number]: I63.9  ICD-9-CM: 434.11  6/26/2015 - 1/9/2017        RESOLVED: Dyslipidemia ICD-10-CM: E78.5  ICD-9-CM: 272.4  6/25/2015 - 1/9/2017        RESOLVED: Pulmonary nodule ICD-10-CM: R91.1  ICD-9-CM: 793.11  6/25/2015 - 1/9/2017        RESOLVED: Receptive aphasia ICD-10-CM: R47.01  ICD-9-CM: 784.3  6/25/2015 - 8/23/2015        RESOLVED: Dysarthria ICD-10-CM: R47.1  ICD-9-CM: 784.51  6/25/2015 - 8/23/2015        RESOLVED: Expressive aphasia ICD-10-CM: R47.01  ICD-9-CM: 784.3  6/25/2015 - 8/23/2015        RESOLVED: ADHF (acute decompensated heart failure) ([facility] ICD-10-CM: I50.9  ICD-9-CM: 428.0  6/24/2015 - 8/23/2015        RESOLVED: Cerebral ventriculomegaly due to brain atrophy ICD-10-CM: G31.9  ICD-9-CM: 331.9  6/24/2015 - 8/23/2015        RESOLVED: Left-sided visual neglect ICD-10-CM: R41.4  ICD-9-CM: 781.8  6/24/2015 - 8/23/2015        RESOLVED: Chest pain ICD-10-CM: R07.9  ICD-9-CM: 786.50  6/23/2015 - 8/23/2015        RESOLVED: RYAN (dyspnea on exertion) ICD-10-CM: R06.09  ICD-9-CM: 786.09  5/22/2015 - 8/23/2015        RESOLVED: Chest tightness ICD-10-CM: R07.89  ICD-9-CM: 786.59  5/22/2015 - 8/23/2015        RESOLVED: Anemia ICD-10-CM: D64.9  ICD-9-CM: 285.9  1/29/2014 - 1/9/2017        RESOLVED: Supratherapeutic INR ICD-10-CM: R79.1  ICD-9-CM: 790.92  1/13/2014 - 1/29/2014        RESOLVED: Melena ICD-10-CM: K92.1  ICD-9-CM: 578.1  1/13/2014 - 1/29/2014        RESOLVED: Macrocytic anemia ICD-10-CM: D53.9  ICD-9-CM: 281.9  1/13/2014 - 1/29/2014        RESOLVED: GI bleed ICD-10-CM: K92.2  ICD-9-CM: 578.9  1/13/2014 - 1/29/2014        RESOLVED: Cough ICD-10-CM: R05  ICD-9-CM: 786.2  1/13/2014 - 1/29/2014        RESOLVED: Diarrhea ICD-10-CM: R19.7  ICD-9-CM: 787.91  1/13/2014 - 1/29/2014        RESOLVED: Coffee ground emesis ICD-10-CM: K92.0  ICD-9-CM: 578.0  1/13/2014 - 1/29/2014        RESOLVED: Left flank pain ICD-10-CM: R10.9  ICD-9-CM: 789.09  1/13/2014 - 1/29/2014        RESOLVED: Weakness generalized ICD-10-CM: R53.1  ICD-9-CM: 780.79  1/13/2014 - 8/23/2015        RESOLVED: Hypoxia ICD-10-CM: R09.02  ICD-9-CM: 799.02  1/13/2014 - 1/29/2014        RESOLVED: CAP (community acquired pneumonia) ICD-10-CM: J18.9  ICD-9-CM: 347  1/13/2014 - 1/29/2014        RESOLVED: Pericardial effusion ICD-10-CM: I31.3  ICD-9-CM: 423.9  1/13/2014 - 1/29/2014        RESOLVED: Elevated blood pressure reading without diagnosis of hypertension ICD-10-CM: R03.0  ICD-9-CM: 796.2  1/13/2014 - 1/29/2014        RESOLVED: Mitral regurgitation ICD-10-CM: I34.0  ICD-9-CM: 424.0  10/22/2010 - 1/9/2017              Subjective:    68 y.o.  female who presented to the Emergency Department complaining of leg swelling. She provides no hx. Family is present. I have reviewed records. A few months ago she had a CVA, and has been persistently more immobile since. She has severe dementia and other medical issues. Family noted LLE swelling yesterday, and she had mild LLE discomfort today.  She is supposed to be on coumadin for Afib, but her INR is only 1.3. ER workup shows extensive LLE DVT and ARF. 1/10:  Dopplers revealed extensive DVT LLE. Pt poorly responsive at baseline since CVA - now seems worse. Lovenox until coumadin therapeutic.     :  Little change from yesterday and remains poorly responsive. She is stable for [facility].     : More alert today. Nursing reports she was more alert yesterday and took diet well. She is stable for [facility].     : Sleeping but arousable. Case management working on placement. She is stable for d/c. Review of Systems:   A comprehensive review of systems was negative except for that written in the HPI. Objective:   Physical Exam:     Visit Vitals    /63 (BP 1 Location: Right arm, BP Patient Position: Lying left side)    Pulse (!) 147    Temp 99.3 °F (37.4 °C)    Resp 16    Ht 5' 4\" (1.626 m)    Wt 136 lb (61.7 kg)    SpO2 95%    Breastfeeding No    BMI 23.34 kg/m2      O2 Device: Room air    Temp (24hrs), Av.8 °F (37.1 °C), Min:98.3 °F (36.8 °C), Max:99.3 °F (37.4 °C)         1901 -  0700  In: -   Out: 0291 [Urine:1550]    General:   no distress, somulent, appears stated age. Head:  Normocephalic, without obvious abnormality, atraumatic. Eyes:  Conjunctivae/corneas clear. EOMs intact. Nose: Nares normal. Septum midline. Mucosa normal. No drainage or sinus tenderness. Throat: Lips, mucosa, and tongue moist..   Neck: Supple, symmetrical, trachea midline, no adenopathy, thyroid: no enlargement/tenderness/nodules and no JVD. Murmer radiates to carotids   Lungs:   Scattered rhonchi bilaterally. Chest wall:  No tenderness or deformity. Heart:  Regular rate and rhythm, S1, S2 normal, 2/6 murmur loudest axilla, apex and LSB. No click, rub or gallop. Abdomen:   Soft, apparently non-tender. Bowel sounds normal. No masses,  No organomegaly. Extremities: no cyanosis or RLE edema; LLE with 2+ edema.  No calf tenderness or cords on right apparent. Skin: Skin color, texture, turgor normal. No rashes or lesions   Neurologic:  Alert and oriented X 0-1. Data Review:     Jong GRIMM Dopplers 1/9/17:  CONCLUSION: Left lower extremity venous duplex positive for acute deep  venous thrombosis and thrombophlebitis throughout the left leg from  external iliac vein down through the calf. Right common femoral vein  is thrombus free.     Recent Days:  Recent Labs      01/12/17   0510   WBC  8.1   HGB  9.9*   HCT  31.7*   PLT  197     Recent Labs      01/13/17   0534  01/12/17   0510  01/11/17   0553   NA   --   140   --    K   --   3.8   --    CL   --   110*   --    CO2   --   23   --    GLU   --   92   --    BUN   --   12   --    CREA   --   0.76   --    CA   --   7.6*   --    ALB   --   2.1*   --    TBILI   --   0.4   --    SGOT   --   18   --    ALT   --   21   --    INR  2.0*  2.4*  1.8*     No results for input(s): PH, PCO2, PO2, HCO3, FIO2 in the last 72 hours.     24 Hour Results:  Recent Results (from the past 24 hour(s))   GLUCOSE, POC    Collection Time: 01/12/17 11:00 AM   Result Value Ref Range    Glucose (POC) 100 65 - 100 mg/dL    Performed by Jonell Denver ([facility])    GLUCOSE, POC    Collection Time: 01/12/17  4:16 PM   Result Value Ref Range    Glucose (POC) 125 (H) 65 - 100 mg/dL    Performed by Jonell Denver ([facility])    GLUCOSE, POC    Collection Time: 01/12/17 10:15 PM   Result Value Ref Range    Glucose (POC) 125 (H) 65 - 100 mg/dL    Performed by Lauren Cody + INR    Collection Time: 01/13/17  5:34 AM   Result Value Ref Range    INR 2.0 (H) 0.9 - 1.1      Prothrombin time 20.7 (H) 9.0 - 11.1 sec   GLUCOSE, POC    Collection Time: 01/13/17  7:49 AM   Result Value Ref Range    Glucose (POC) 90 65 - 100 mg/dL    Performed by Charlee Valenzuela ([facility])        Medications reviewed  Current Facility-Administered Medications   Medication Dose Route Frequency    albuterol (ACCUNEB) nebulizer solution 1.25 mg 1.25 mg Nebulization Q4H PRN    bimatoprost (LUMIGAN) 0.01 % ophthalmic drops 1 Drop  1 Drop Right Eye QHS    brimonidine (ALPHAGAN P) 0.1 % ophthalmic solution 1 Drop  1 Drop Right Eye BID    dorzolamide-timolol (COSOPT) 22.3-6.8 mg/mL ophthalmic solution 1 Drop  1 Drop Right Eye BID    memantine ER (NAMENDA XR) capsule 28 mg  28 mg Oral DAILY    QUEtiapine (SEROquel) tablet 12.5 mg  12.5 mg Oral QHS    naloxone (NARCAN) injection 0.4 mg  0.4 mg IntraVENous PRN    0.9% sodium chloride infusion  50 mL/hr IntraVENous CONTINUOUS    acetaminophen (TYLENOL) tablet 650 mg  650 mg Oral Q4H PRN    HYDROcodone-acetaminophen (NORCO) 5-325 mg per tablet 1 Tab  1 Tab Oral Q4H PRN    diphenhydrAMINE (BENADRYL) capsule 25 mg  25 mg Oral Q4H PRN    ondansetron (ZOFRAN) injection 4 mg  4 mg IntraVENous Q4H PRN    enoxaparin (LOVENOX) injection 60 mg  1 mg/kg SubCUTAneous Q12H    metoprolol tartrate (LOPRESSOR) tablet 75 mg  75 mg Oral BID    disopyramide phosphate (NORPACE) capsule 100 mg  100 mg Oral BID    Warfarin- Pharmacy to dose daily   Other Rx Dosing/Monitoring       Care Plan discussed with: Patient and Nurse    Total time spent with patient and review of records: 30 minutes.     Marie Boo MD

## 2017-01-14 LAB
GLUCOSE BLD STRIP.AUTO-MCNC: 123 MG/DL (ref 65–100)
GLUCOSE BLD STRIP.AUTO-MCNC: 148 MG/DL (ref 65–100)
GLUCOSE BLD STRIP.AUTO-MCNC: 149 MG/DL (ref 65–100)
GLUCOSE BLD STRIP.AUTO-MCNC: 253 MG/DL (ref 65–100)
GLUCOSE BLD STRIP.AUTO-MCNC: 89 MG/DL (ref 65–100)
SERVICE CMNT-IMP: ABNORMAL
SERVICE CMNT-IMP: NORMAL

## 2017-01-14 PROCEDURE — 82962 GLUCOSE BLOOD TEST: CPT

## 2017-01-14 PROCEDURE — 74011250637 HC RX REV CODE- 250/637: Performed by: FAMILY MEDICINE

## 2017-01-14 PROCEDURE — 74011250637 HC RX REV CODE- 250/637: Performed by: INTERNAL MEDICINE

## 2017-01-14 PROCEDURE — 65660000000 HC RM CCU STEPDOWN

## 2017-01-14 RX ORDER — WARFARIN SODIUM 5 MG/1
5 TABLET ORAL ONCE
Status: COMPLETED | OUTPATIENT
Start: 2017-01-14 | End: 2017-01-14

## 2017-01-14 RX ADMIN — BIMATOPROST 1 DROP: 0.1 SOLUTION/ DROPS OPHTHALMIC at 21:49

## 2017-01-14 RX ADMIN — METOPROLOL TARTRATE 75 MG: 25 TABLET ORAL at 09:26

## 2017-01-14 RX ADMIN — DORZOLAMIDE HYDROCHLORIDE AND TIMOLOL MALEATE 1 DROP: 20; 5 SOLUTION/ DROPS OPHTHALMIC at 21:49

## 2017-01-14 RX ADMIN — METOPROLOL TARTRATE 75 MG: 25 TABLET ORAL at 21:47

## 2017-01-14 RX ADMIN — QUETIAPINE FUMARATE 12.5 MG: 25 TABLET, FILM COATED ORAL at 21:47

## 2017-01-14 RX ADMIN — DORZOLAMIDE HYDROCHLORIDE AND TIMOLOL MALEATE 1 DROP: 20; 5 SOLUTION/ DROPS OPHTHALMIC at 09:27

## 2017-01-14 RX ADMIN — WARFARIN SODIUM 5 MG: 5 TABLET ORAL at 18:07

## 2017-01-14 RX ADMIN — BRIMONIDINE TARTRATE 1 DROP: 1 SOLUTION/ DROPS OPHTHALMIC at 21:49

## 2017-01-14 RX ADMIN — BRIMONIDINE TARTRATE 1 DROP: 1 SOLUTION/ DROPS OPHTHALMIC at 09:27

## 2017-01-14 RX ADMIN — MEMANTINE HYDROCHLORIDE 28 MG: 28 CAPSULE, EXTENDED RELEASE ORAL at 09:26

## 2017-01-14 RX ADMIN — DISOPYRAMIDE PHOSPHATE 100 MG: 100 CAPSULE ORAL at 09:26

## 2017-01-14 RX ADMIN — DISOPYRAMIDE PHOSPHATE 100 MG: 100 CAPSULE ORAL at 21:47

## 2017-01-14 NOTE — ROUTINE PROCESS
Bedside and Verbal shift change report given to Lyndsey Saenz RN (oncoming nurse) by Lopez Parish (offgoing nurse).  Report included the following information SBAR, Kardex, ED Summary, Intake/Output, MAR and Recent Results

## 2017-01-14 NOTE — ROUTINE PROCESS
Bedside and Verbal shift change report given to Melecio Etienne RN (oncoming nurse) by Loyd Drew RN (offgoing nurse). Report included the following information SBAR, Kardex, ED Summary, Intake/Output, MAR and Recent Results.

## 2017-01-14 NOTE — PROGRESS NOTES
Daily Progress Note: 1/14/2017  Rosi Jimenez MD    Assessment/Plan:   Deep venous thrombosis - New acute and extensive. --pharmacy dosing coumadin  Last INR 2.0 yesterday     ARF (acute renal failure) / Dehydration - POA, likely due to poor PO intake. Hydrate. Hold PO K and hold Bumex  --recheck BMP in AM     Hypertrophic cardiomyopathy / Chronic diastolic heart failure -no exacerbation   Hold bumex, continue metoprolol, home norpace     Dementia / Cerebral atrophy - Severe. Continue seroquel and memantine.     Paroxysmal atrial fibrillation / CAD (coronary artery disease) - No acute symptoms. Continue BB. Not on ASA. Hold statin.     Glaucoma - Continue lumigan, alphagan and cospot     Hyperlipidemia- Given age and co morbidities, I would not test or treat hyperlipidemia     Weakness due to cerebrovascular accident - Fall precautions. Pureed and nector diet. She is at baseline, making no improvement since stroke. Family wants out of SNF, and into LTC         Recent UTI - Had completed keflex. Current UA unremarkable.  Monitor.           Problem List:  Problem List as of 1/14/2017  Date Reviewed: 1/9/2017          Codes Class Noted - Resolved    Deep venous thrombosis ([facility] ICD-10-CM: I82.409  ICD-9-CM: 453.40  1/9/2017 - Present        CAD (coronary artery disease) ICD-10-CM: I25.10  ICD-9-CM: 414.00  Unknown - Present        Dehydration ICD-10-CM: E86.0  ICD-9-CM: 276.51  1/9/2017 - Present        ARF (acute renal failure) ([facility] ICD-10-CM: N17.9  ICD-9-CM: 584.9  1/9/2017 - Present        Weakness due to cerebrovascular accident (CVA) ([facility] ICD-10-CM: I63.9, R53.1  ICD-9-CM: 434.91, 780.79  10/26/2016 - Present        Goals of care, counseling/discussion ICD-10-CM: Z71.89  ICD-9-CM: V65.49  10/26/2016 - Present        Hyperlipidemia LDL goal <70 ICD-10-CM: E78.5  ICD-9-CM: 272.4  10/25/2016 - Present        Cerebral atrophy ICD-10-CM: G31.9  ICD-9-CM: 331.9  10/25/2016 - Present Dementia ICD-10-CM: F03.90  ICD-9-CM: 294.20  10/25/2016 - Present        Glaucoma ICD-10-CM: H40.9  ICD-9-CM: 365.9  1/13/2014 - Present        Hypertrophic cardiomyopathy ([facility] ICD-10-CM: I42.2  ICD-9-CM: 425.18  6/1/2012 - Present        Paroxysmal atrial fibrillation ([facility] ICD-10-CM: I48.0  ICD-9-CM: 427.31  11/29/2011 - Present        Chronic diastolic heart failure (HCC) ICD-10-CM: I50.32  ICD-9-CM: 428.32  2/26/2011 - Present        RESOLVED: Carotid occlusion, left ICD-10-CM: I65.22  ICD-9-CM: 433.10  10/25/2016 - 1/9/2017        RESOLVED: Acute CVA (cerebrovascular accident) ([facility] ICD-10-CM: I63.9  ICD-9-CM: 434.91  10/24/2016 - 1/9/2017        RESOLVED: Orthostatic hypotension ICD-10-CM: I95.1  ICD-9-CM: 458.0  10/28/2015 - 1/9/2017        RESOLVED: Acute embolic stroke ([facility] [id number]: I63.9  ICD-9-CM: 434.11  6/26/2015 - 1/9/2017        RESOLVED: Dyslipidemia ICD-10-CM: E78.5  ICD-9-CM: 272.4  6/25/2015 - 1/9/2017        RESOLVED: Pulmonary nodule ICD-10-CM: R91.1  ICD-9-CM: 793.11  6/25/2015 - 1/9/2017        RESOLVED: Receptive aphasia ICD-10-CM: R47.01  ICD-9-CM: 784.3  6/25/2015 - 8/23/2015        RESOLVED: Dysarthria ICD-10-CM: R47.1  ICD-9-CM: 784.51  6/25/2015 - 8/23/2015        RESOLVED: Expressive aphasia ICD-10-CM: R47.01  ICD-9-CM: 784.3  6/25/2015 - 8/23/2015        RESOLVED: ADHF (acute decompensated heart failure) ([facility] ICD-10-CM: I50.9  ICD-9-CM: 428.0  6/24/2015 - 8/23/2015        RESOLVED: Cerebral ventriculomegaly due to brain atrophy ICD-10-CM: G31.9  ICD-9-CM: 331.9  6/24/2015 - 8/23/2015        RESOLVED: Left-sided visual neglect ICD-10-CM: R41.4  ICD-9-CM: 781.8  6/24/2015 - 8/23/2015        RESOLVED: Chest pain ICD-10-CM: R07.9  ICD-9-CM: 786.50  6/23/2015 - 8/23/2015        RESOLVED: RYAN (dyspnea on exertion) ICD-10-CM: R06.09  ICD-9-CM: 786.09  5/22/2015 - 8/23/2015        RESOLVED: Chest tightness ICD-10-CM: R07.89  ICD-9-CM: 786.59  5/22/2015 - 8/23/2015        RESOLVED: Anemia ICD-10-CM: D64.9  ICD-9-CM: 285.9  1/29/2014 - 1/9/2017        RESOLVED: Supratherapeutic INR ICD-10-CM: R79.1  ICD-9-CM: 790.92  1/13/2014 - 1/29/2014        RESOLVED: Melena ICD-10-CM: K92.1  ICD-9-CM: 578.1  1/13/2014 - 1/29/2014        RESOLVED: Macrocytic anemia ICD-10-CM: D53.9  ICD-9-CM: 281.9  1/13/2014 - 1/29/2014        RESOLVED: GI bleed ICD-10-CM: K92.2  ICD-9-CM: 578.9  1/13/2014 - 1/29/2014        RESOLVED: Cough ICD-10-CM: R05  ICD-9-CM: 786.2  1/13/2014 - 1/29/2014        RESOLVED: Diarrhea ICD-10-CM: R19.7  ICD-9-CM: 787.91  1/13/2014 - 1/29/2014        RESOLVED: Coffee ground emesis ICD-10-CM: K92.0  ICD-9-CM: 578.0  1/13/2014 - 1/29/2014        RESOLVED: Left flank pain ICD-10-CM: R10.9  ICD-9-CM: 789.09  1/13/2014 - 1/29/2014        RESOLVED: Weakness generalized ICD-10-CM: R53.1  ICD-9-CM: 780.79  1/13/2014 - 8/23/2015        RESOLVED: Hypoxia ICD-10-CM: R09.02  ICD-9-CM: 799.02  1/13/2014 - 1/29/2014        RESOLVED: CAP (community acquired pneumonia) ICD-10-CM: J18.9  ICD-9-CM: 190  1/13/2014 - 1/29/2014        RESOLVED: Pericardial effusion ICD-10-CM: I31.3  ICD-9-CM: 423.9  1/13/2014 - 1/29/2014        RESOLVED: Elevated blood pressure reading without diagnosis of hypertension ICD-10-CM: R03.0  ICD-9-CM: 796.2  1/13/2014 - 1/29/2014        RESOLVED: Mitral regurgitation ICD-10-CM: I34.0  ICD-9-CM: 424.0  10/22/2010 - 1/9/2017              Subjective:    68 y.o.  female who presented to the Emergency Department complaining of leg swelling. She provides no hx. Family is present. I have reviewed records. A few months ago she had a CVA, and has been persistently more immobile since. She has severe dementia and other medical issues. Family noted LLE swelling yesterday, and she had mild LLE discomfort today. She is supposed to be on coumadin for Afib, but her INR is only 1.3. ER workup shows extensive LLE DVT and ARF. 1/10:  Dopplers revealed extensive DVT LLE.   Pt poorly responsive at baseline since CVA - now seems worse. Lovenox until coumadin therapeutic.     :  Little change from yesterday and remains poorly responsive. She is stable for [facility].     : More alert today. Nursing reports she was more alert yesterday and took diet well. She is stable for [facility].     : Sleeping but arousable. Case management working on placement. She is stable for d/c.    : Patient is awake and alert this morning. She looks comfortable. Able to say yes/no a few times but unreliable answers. Believes she is at home. Does not seem to be in pain. No acute events overnight. Review of Systems:   Review of systems not obtained due to patient factors. Objective:   Physical Exam:     Visit Vitals    /89 (BP 1 Location: Left arm, BP Patient Position: At rest)    Pulse 73    Temp 97.6 °F (36.4 °C)    Resp 16    Ht 5' 4\" (1.626 m)    Wt 61.7 kg (136 lb)    SpO2 96%    Breastfeeding No    BMI 23.34 kg/m2      O2 Device: Room air    Temp (24hrs), Av.4 °F (36.9 °C), Min:97.6 °F (36.4 °C), Max:99.2 °F (37.3 °C)         1901 -  0700  In: -   Out: 8973 [Urine:1650]    General:   awake and alert   Head:  Normocephalic, without obvious abnormality, atraumatic. Eyes:  Conjunctivae/corneas clear. EOMs intact. Nose: Nares normal. Septum midline. Mucosa normal. No drainage or sinus tenderness. Throat: Lips, mucosa, and tongue moist..   Neck: Supple, symmetrical, trachea midline   Lungs:   Ctab, no w/r/r   Heart:  Regular rate and rhythm, S1, S2 normal, 2/6 murmur loudest axilla, apex and LSB. No click, rub or gallop. Abdomen:   Soft, apparently non-tender. Bowel sounds normal. No masses,  No organomegaly. Extremities: no cyanosis or RLE edema; LLE with 2+ edema.    Skin: Skin color, texture, turgor normal. No rashes or lesions   Neurologic:  Alert and oriented X 0        Data Review:     Jong LE Dopplers 17:  CONCLUSION: Left lower extremity venous duplex positive for acute deep  venous thrombosis and thrombophlebitis throughout the left leg from  external iliac vein down through the calf. Right common femoral vein  is thrombus free.     Recent Days:  Recent Labs      01/12/17   0510   WBC  8.1   HGB  9.9*   HCT  31.7*   PLT  197     Recent Labs      01/13/17   0534  01/12/17   0510   NA   --   140   K   --   3.8   CL   --   110*   CO2   --   23   GLU   --   92   BUN   --   12   CREA   --   0.76   CA   --   7.6*   ALB   --   2.1*   TBILI   --   0.4   SGOT   --   18   ALT   --   21   INR  2.0*  2.4*     No results for input(s): PH, PCO2, PO2, HCO3, FIO2 in the last 72 hours.     24 Hour Results:  Recent Results (from the past 24 hour(s))   GLUCOSE, POC    Collection Time: 01/13/17 11:38 AM   Result Value Ref Range    Glucose (POC) 123 (H) 65 - 100 mg/dL    Performed by Charlee Valenzuela ([facility])    GLUCOSE, POC    Collection Time: 01/13/17  4:29 PM   Result Value Ref Range    Glucose (POC) 107 (H) 65 - 100 mg/dL    Performed by Charlee Valenzuela ([facility])    GLUCOSE, POC    Collection Time: 01/13/17  9:23 PM   Result Value Ref Range    Glucose (POC) 133 (H) 65 - 100 mg/dL    Performed by Geraldo Boykin    GLUCOSE, POC    Collection Time: 01/14/17  7:28 AM   Result Value Ref Range    Glucose (POC) 89 65 - 100 mg/dL    Performed by Suad Handy        Medications reviewed  Current Facility-Administered Medications   Medication Dose Route Frequency    bisacodyl (DULCOLAX) suppository 10 mg  10 mg Rectal DAILY PRN    albuterol (ACCUNEB) nebulizer solution 1.25 mg  1.25 mg Nebulization Q4H PRN    bimatoprost (LUMIGAN) 0.01 % ophthalmic drops 1 Drop  1 Drop Right Eye QHS    brimonidine (ALPHAGAN P) 0.1 % ophthalmic solution 1 Drop  1 Drop Right Eye BID    dorzolamide-timolol (COSOPT) 22.3-6.8 mg/mL ophthalmic solution 1 Drop  1 Drop Right Eye BID    memantine ER (NAMENDA XR) capsule 28 mg  28 mg Oral DAILY    QUEtiapine (SEROquel) tablet 12.5 mg  12.5 mg Oral QHS    naloxone (NARCAN) injection 0.4 mg  0.4 mg IntraVENous PRN    0.9% sodium chloride infusion  50 mL/hr IntraVENous CONTINUOUS    acetaminophen (TYLENOL) tablet 650 mg  650 mg Oral Q4H PRN    HYDROcodone-acetaminophen (NORCO) 5-325 mg per tablet 1 Tab  1 Tab Oral Q4H PRN    diphenhydrAMINE (BENADRYL) capsule 25 mg  25 mg Oral Q4H PRN    ondansetron (ZOFRAN) injection 4 mg  4 mg IntraVENous Q4H PRN    metoprolol tartrate (LOPRESSOR) tablet 75 mg  75 mg Oral BID    disopyramide phosphate (NORPACE) capsule 100 mg  100 mg Oral BID    Warfarin- Pharmacy to dose daily   Other Rx Dosing/Monitoring           Florian Milligan MD

## 2017-01-14 NOTE — ROUTINE PROCESS
Bedside and Verbal shift change report given to Izabela Roman RN (oncoming nurse) by Daphnie Zacarias RN (offgoing nurse). Report included the following information SBAR, Kardex, ED Summary, Intake/Output, MAR and Recent Results.

## 2017-01-14 NOTE — PROGRESS NOTES
[facility] Dosing Services: 1/14/2017    Consult for Warfarin Dosing by Pharmacy by Dr. Francesco Cooks provided for this 68 y.o. female for indication of Venous Thrombosis. Day of Therapy continued from home 5mg Monday, Tuesday, Wednesday ,Thursday, and Friday. 6mg on Saturday and Sunday. INR 1.3 on admission. Patient admitted from [facility]  Dose to achieve an INR goal of 2-3    Order entered for  Warfarin  5 (mg) ordered to be given today at 18:00. Significant drug interactions: Enoxaparin dc'd this am  Previous dose given 5 mg   PT/INR Lab Results   Component Value Date/Time    INR 2.0 01/13/2017 05:34 AM    INR (POC) 1.0 10/24/2016 06:33 PM    INR POC 2.2 05/28/2015 01:01 PM      Platelets Lab Results   Component Value Date/Time    PLATELET 178 18/10/7248 05:10 AM      H/H Lab Results   Component Value Date/Time    HGB 9.9 01/12/2017 05:10 AM        Pharmacy to follow daily and will provide subsequent Warfarin dosing based on clinical status.   [facility] [place], [facility])  Contact information [phone]

## 2017-01-15 LAB
ANION GAP BLD CALC-SCNC: 4 MMOL/L (ref 5–15)
BASOPHILS # BLD AUTO: 0 K/UL (ref 0–0.1)
BASOPHILS # BLD: 1 % (ref 0–1)
BUN SERPL-MCNC: 16 MG/DL (ref 6–20)
BUN/CREAT SERPL: 20 (ref 12–20)
CALCIUM SERPL-MCNC: 8 MG/DL (ref 8.5–10.1)
CHLORIDE SERPL-SCNC: 106 MMOL/L (ref 97–108)
CO2 SERPL-SCNC: 27 MMOL/L (ref 21–32)
CREAT SERPL-MCNC: 0.82 MG/DL (ref 0.55–1.02)
EOSINOPHIL # BLD: 0.3 K/UL (ref 0–0.4)
EOSINOPHIL NFR BLD: 4 % (ref 0–7)
ERYTHROCYTE [DISTWIDTH] IN BLOOD BY AUTOMATED COUNT: 12.9 % (ref 11.5–14.5)
GLUCOSE BLD STRIP.AUTO-MCNC: 100 MG/DL (ref 65–100)
GLUCOSE BLD STRIP.AUTO-MCNC: 129 MG/DL (ref 65–100)
GLUCOSE BLD STRIP.AUTO-MCNC: 131 MG/DL (ref 65–100)
GLUCOSE BLD STRIP.AUTO-MCNC: 89 MG/DL (ref 65–100)
GLUCOSE SERPL-MCNC: 94 MG/DL (ref 65–100)
HCT VFR BLD AUTO: 32.1 % (ref 35–47)
HGB BLD-MCNC: 10.4 G/DL (ref 11.5–16)
INR PPP: 2.4 (ref 0.9–1.1)
LYMPHOCYTES # BLD AUTO: 25 % (ref 12–49)
LYMPHOCYTES # BLD: 2 K/UL (ref 0.8–3.5)
MCH RBC QN AUTO: 31.7 PG (ref 26–34)
MCHC RBC AUTO-ENTMCNC: 32.4 G/DL (ref 30–36.5)
MCV RBC AUTO: 97.9 FL (ref 80–99)
MONOCYTES # BLD: 0.7 K/UL (ref 0–1)
MONOCYTES NFR BLD AUTO: 8 % (ref 5–13)
NEUTS SEG # BLD: 5.1 K/UL (ref 1.8–8)
NEUTS SEG NFR BLD AUTO: 62 % (ref 32–75)
PLATELET # BLD AUTO: 277 K/UL (ref 150–400)
POTASSIUM SERPL-SCNC: 4.2 MMOL/L (ref 3.5–5.1)
PROTHROMBIN TIME: 25.3 SEC (ref 9–11.1)
RBC # BLD AUTO: 3.28 M/UL (ref 3.8–5.2)
SERVICE CMNT-IMP: ABNORMAL
SERVICE CMNT-IMP: ABNORMAL
SERVICE CMNT-IMP: NORMAL
SERVICE CMNT-IMP: NORMAL
SODIUM SERPL-SCNC: 137 MMOL/L (ref 136–145)
WBC # BLD AUTO: 8.1 K/UL (ref 3.6–11)

## 2017-01-15 PROCEDURE — 82962 GLUCOSE BLOOD TEST: CPT

## 2017-01-15 PROCEDURE — 65660000000 HC RM CCU STEPDOWN

## 2017-01-15 PROCEDURE — 77030011943

## 2017-01-15 PROCEDURE — 74011250637 HC RX REV CODE- 250/637: Performed by: FAMILY MEDICINE

## 2017-01-15 PROCEDURE — 87077 CULTURE AEROBIC IDENTIFY: CPT | Performed by: FAMILY MEDICINE

## 2017-01-15 PROCEDURE — 80048 BASIC METABOLIC PNL TOTAL CA: CPT | Performed by: FAMILY MEDICINE

## 2017-01-15 PROCEDURE — 85025 COMPLETE CBC W/AUTO DIFF WBC: CPT | Performed by: FAMILY MEDICINE

## 2017-01-15 PROCEDURE — 85610 PROTHROMBIN TIME: CPT | Performed by: INTERNAL MEDICINE

## 2017-01-15 PROCEDURE — 74011250637 HC RX REV CODE- 250/637: Performed by: INTERNAL MEDICINE

## 2017-01-15 PROCEDURE — 87186 SC STD MICRODIL/AGAR DIL: CPT | Performed by: FAMILY MEDICINE

## 2017-01-15 PROCEDURE — 36415 COLL VENOUS BLD VENIPUNCTURE: CPT | Performed by: INTERNAL MEDICINE

## 2017-01-15 PROCEDURE — 87086 URINE CULTURE/COLONY COUNT: CPT | Performed by: FAMILY MEDICINE

## 2017-01-15 RX ORDER — WARFARIN SODIUM 5 MG/1
5 TABLET ORAL ONCE
Status: COMPLETED | OUTPATIENT
Start: 2017-01-15 | End: 2017-01-15

## 2017-01-15 RX ADMIN — DISOPYRAMIDE PHOSPHATE 100 MG: 100 CAPSULE ORAL at 21:26

## 2017-01-15 RX ADMIN — METOPROLOL TARTRATE 75 MG: 25 TABLET ORAL at 21:26

## 2017-01-15 RX ADMIN — WARFARIN SODIUM 5 MG: 5 TABLET ORAL at 17:09

## 2017-01-15 RX ADMIN — BRIMONIDINE TARTRATE 1 DROP: 1 SOLUTION/ DROPS OPHTHALMIC at 08:59

## 2017-01-15 RX ADMIN — BIMATOPROST 1 DROP: 0.1 SOLUTION/ DROPS OPHTHALMIC at 22:12

## 2017-01-15 RX ADMIN — DORZOLAMIDE HYDROCHLORIDE AND TIMOLOL MALEATE 1 DROP: 20; 5 SOLUTION/ DROPS OPHTHALMIC at 21:25

## 2017-01-15 RX ADMIN — METOPROLOL TARTRATE 75 MG: 25 TABLET ORAL at 08:59

## 2017-01-15 RX ADMIN — DISOPYRAMIDE PHOSPHATE 100 MG: 100 CAPSULE ORAL at 08:59

## 2017-01-15 RX ADMIN — QUETIAPINE FUMARATE 12.5 MG: 25 TABLET, FILM COATED ORAL at 21:26

## 2017-01-15 RX ADMIN — MEMANTINE HYDROCHLORIDE 28 MG: 28 CAPSULE, EXTENDED RELEASE ORAL at 08:59

## 2017-01-15 RX ADMIN — DORZOLAMIDE HYDROCHLORIDE AND TIMOLOL MALEATE 1 DROP: 20; 5 SOLUTION/ DROPS OPHTHALMIC at 09:00

## 2017-01-15 RX ADMIN — BRIMONIDINE TARTRATE 1 DROP: 1 SOLUTION/ DROPS OPHTHALMIC at 21:25

## 2017-01-15 NOTE — PROGRESS NOTES
Daily Progress Note: 1/15/2017  Viki Tariq MD    Assessment/Plan:   Deep venous thrombosis - New acute and extensive. --pharmacy dosing coumadin  INR good at 2.4     ARF (acute renal failure) / Dehydration - POA, likely due to poor PO intake. Hydrate. Hold PO K and hold Bumex  --Cr back to baseline, K normal     Hypertrophic cardiomyopathy / Chronic diastolic heart failure -no exacerbation  -Hold bumex, continue metoprolol, home norpace     Dementia / Cerebral atrophy - Severe. Continue seroquel and memantine.     Paroxysmal atrial fibrillation / CAD (coronary artery disease) - No acute symptoms. Continue BB. Not on ASA. Hold statin.     Glaucoma - Continue lumigan, alphagan and cospot     Hyperlipidemia- Given age and co morbidities, I would not test or treat hyperlipidemia     Weakness due to cerebrovascular accident - Fall precautions. Pureed and nector diet. She is at baseline, making no improvement since stroke. Family wants out of SNF, and into LTC         Recent UTI - Had completed keflex.  UA without bacteria - urine culture with VRE - doubt true infection - may need ID input prior to placement  - repeat as cath specimen           Problem List:  Problem List as of 1/15/2017  Date Reviewed: 1/9/2017          Codes Class Noted - Resolved    Deep venous thrombosis ([facility] ICD-10-CM: I82.409  ICD-9-CM: 453.40  1/9/2017 - Present        CAD (coronary artery disease) ICD-10-CM: I25.10  ICD-9-CM: 414.00  Unknown - Present        Dehydration ICD-10-CM: E86.0  ICD-9-CM: 276.51  1/9/2017 - Present        ARF (acute renal failure) ([facility] ICD-10-CM: N17.9  ICD-9-CM: 584.9  1/9/2017 - Present        Weakness due to cerebrovascular accident (CVA) ([facility] ICD-10-CM: I63.9, R53.1  ICD-9-CM: 434.91, 780.79  10/26/2016 - Present        Goals of care, counseling/discussion ICD-10-CM: Z71.89  ICD-9-CM: V65.49  10/26/2016 - Present        Hyperlipidemia LDL goal <70 ICD-10-CM: E78.5  ICD-9-CM: 272.4 10/25/2016 - Present        Cerebral atrophy ICD-10-CM: G31.9  ICD-9-CM: 331.9  10/25/2016 - Present        Dementia ICD-10-CM: F03.90  ICD-9-CM: 294.20  10/25/2016 - Present        Glaucoma ICD-10-CM: H40.9  ICD-9-CM: 365.9  1/13/2014 - Present        Hypertrophic cardiomyopathy ([facility] ICD-10-CM: I42.2  ICD-9-CM: 425.18  6/1/2012 - Present        Paroxysmal atrial fibrillation ([facility] ICD-10-CM: I48.0  ICD-9-CM: 427.31  11/29/2011 - Present        Chronic diastolic heart failure (HCC) ICD-10-CM: I50.32  ICD-9-CM: 428.32  2/26/2011 - Present        RESOLVED: Carotid occlusion, left ICD-10-CM: I65.22  ICD-9-CM: 433.10  10/25/2016 - 1/9/2017        RESOLVED: Acute CVA (cerebrovascular accident) ([facility] ICD-10-CM: I63.9  ICD-9-CM: 434.91  10/24/2016 - 1/9/2017        RESOLVED: Orthostatic hypotension ICD-10-CM: I95.1  ICD-9-CM: 458.0  10/28/2015 - 1/9/2017        RESOLVED: Acute embolic stroke ([facility] [id number]: I63.9  ICD-9-CM: 434.11  6/26/2015 - 1/9/2017        RESOLVED: Dyslipidemia ICD-10-CM: E78.5  ICD-9-CM: 272.4  6/25/2015 - 1/9/2017        RESOLVED: Pulmonary nodule ICD-10-CM: R91.1  ICD-9-CM: 793.11  6/25/2015 - 1/9/2017        RESOLVED: Receptive aphasia ICD-10-CM: R47.01  ICD-9-CM: 784.3  6/25/2015 - 8/23/2015        RESOLVED: Dysarthria ICD-10-CM: R47.1  ICD-9-CM: 784.51  6/25/2015 - 8/23/2015        RESOLVED: Expressive aphasia ICD-10-CM: R47.01  ICD-9-CM: 784.3  6/25/2015 - 8/23/2015        RESOLVED: ADHF (acute decompensated heart failure) ([facility] ICD-10-CM: I50.9  ICD-9-CM: 428.0  6/24/2015 - 8/23/2015        RESOLVED: Cerebral ventriculomegaly due to brain atrophy ICD-10-CM: G31.9  ICD-9-CM: 331.9  6/24/2015 - 8/23/2015        RESOLVED: Left-sided visual neglect ICD-10-CM: R41.4  ICD-9-CM: 781.8  6/24/2015 - 8/23/2015        RESOLVED: Chest pain ICD-10-CM: R07.9  ICD-9-CM: 786.50  6/23/2015 - 8/23/2015        RESOLVED: RYAN (dyspnea on exertion) ICD-10-CM: R06.09  ICD-9-CM: 786.09  5/22/2015 - 8/23/2015 RESOLVED: Chest tightness ICD-10-CM: R07.89  ICD-9-CM: 786.59  5/22/2015 - 8/23/2015        RESOLVED: Anemia ICD-10-CM: D64.9  ICD-9-CM: 285.9  1/29/2014 - 1/9/2017        RESOLVED: Supratherapeutic INR ICD-10-CM: R79.1  ICD-9-CM: 790.92  1/13/2014 - 1/29/2014        RESOLVED: Melena ICD-10-CM: K92.1  ICD-9-CM: 578.1  1/13/2014 - 1/29/2014        RESOLVED: Macrocytic anemia ICD-10-CM: D53.9  ICD-9-CM: 281.9  1/13/2014 - 1/29/2014        RESOLVED: GI bleed ICD-10-CM: K92.2  ICD-9-CM: 578.9  1/13/2014 - 1/29/2014        RESOLVED: Cough ICD-10-CM: R05  ICD-9-CM: 786.2  1/13/2014 - 1/29/2014        RESOLVED: Diarrhea ICD-10-CM: R19.7  ICD-9-CM: 787.91  1/13/2014 - 1/29/2014        RESOLVED: Coffee ground emesis ICD-10-CM: K92.0  ICD-9-CM: 578.0  1/13/2014 - 1/29/2014        RESOLVED: Left flank pain ICD-10-CM: R10.9  ICD-9-CM: 789.09  1/13/2014 - 1/29/2014        RESOLVED: Weakness generalized ICD-10-CM: R53.1  ICD-9-CM: 780.79  1/13/2014 - 8/23/2015        RESOLVED: Hypoxia ICD-10-CM: R09.02  ICD-9-CM: 799.02  1/13/2014 - 1/29/2014        RESOLVED: CAP (community acquired pneumonia) ICD-10-CM: J18.9  ICD-9-CM: 913  1/13/2014 - 1/29/2014        RESOLVED: Pericardial effusion ICD-10-CM: I31.3  ICD-9-CM: 423.9  1/13/2014 - 1/29/2014        RESOLVED: Elevated blood pressure reading without diagnosis of hypertension ICD-10-CM: R03.0  ICD-9-CM: 796.2  1/13/2014 - 1/29/2014        RESOLVED: Mitral regurgitation ICD-10-CM: I34.0  ICD-9-CM: 424.0  10/22/2010 - 1/9/2017              Subjective:    68 y.o.  female who presented to the Emergency Department complaining of leg swelling. She provides no hx. Family is present. I have reviewed records. A few months ago she had a CVA, and has been persistently more immobile since. She has severe dementia and other medical issues. Family noted LLE swelling yesterday, and she had mild LLE discomfort today. She is supposed to be on coumadin for Afib, but her INR is only 1.3.  ER workup shows extensive LLE DVT and ARF. 1/10:  Dopplers revealed extensive DVT LLE. Pt poorly responsive at baseline since CVA - now seems worse. Lovenox until coumadin therapeutic.     :  Little change from yesterday and remains poorly responsive. She is stable for [facility].     : More alert today. Nursing reports she was more alert yesterday and took diet well. She is stable for [facility].     : Sleeping but arousable. Case management working on placement. She is stable for d/c.    : Patient is awake and alert this morning. She looks comfortable. Able to say yes/no a few times but unreliable answers. Believes she is at home. Does not seem to be in pain. No acute events overnight. 1/15: No acute events overnight. Patient answers simple yes/no questions but unreliable. Review of Systems:   Review of systems not obtained due to patient factors. Objective:   Physical Exam:     Visit Vitals    /66 (BP 1 Location: Left arm, BP Patient Position: At rest)    Pulse 71    Temp 98.1 °F (36.7 °C)    Resp 16    Ht 5' 4\" (1.626 m)    Wt 61.7 kg (136 lb)    SpO2 95%    Breastfeeding No    BMI 23.34 kg/m2      O2 Device: Room air    Temp (24hrs), Av.9 °F (36.6 °C), Min:97.3 °F (36.3 °C), Max:98.4 °F (36.9 °C)         1901 - 01/15 0700  In: -   Out: 1050 [Urine:1050]    General:   awake and alert   Head:  Normocephalic, without obvious abnormality, atraumatic. Eyes:  Conjunctivae/corneas clear. EOMs intact. Nose: Nares normal. Septum midline. Mucosa normal. No drainage or sinus tenderness. Throat: Lips, mucosa, and tongue moist..   Neck: Supple, symmetrical, trachea midline   Lungs:   Ctab, no w/r/r   Heart:  Regular rate and rhythm, S1, S2 normal, 2/6 murmur loudest axilla, apex and LSB. No click, rub or gallop. Abdomen:   Soft, apparently non-tender. Bowel sounds normal. No masses,  No organomegaly. Extremities: no cyanosis or RLE edema; LLE with trace to 1+ edema. Skin: Skin color, texture, turgor normal. No rashes or lesions   Neurologic:  Alert and oriented X 0        Data Review:     Jong LE Dopplers 1/9/17:  CONCLUSION: Left lower extremity venous duplex positive for acute deep  venous thrombosis and thrombophlebitis throughout the left leg from  external iliac vein down through the calf. Right common femoral vein  is thrombus free.     Recent Days:  Recent Labs      01/15/17   0407   WBC  8.1   HGB  10.4*   HCT  32.1*   PLT  277     Recent Labs      01/15/17   0407  01/13/17   0534   NA  137   --    K  4.2   --    CL  106   --    CO2  27   --    GLU  94   --    BUN  16   --    CREA  0.82   --    CA  8.0*   --    INR  2.4*  2.0*     No results for input(s): PH, PCO2, PO2, HCO3, FIO2 in the last 72 hours.     24 Hour Results:  Recent Results (from the past 24 hour(s))   GLUCOSE, POC    Collection Time: 01/14/17 11:40 AM   Result Value Ref Range    Glucose (POC) 253 (H) 65 - 100 mg/dL    Performed by Denise Umaña    GLUCOSE, POC    Collection Time: 01/14/17 11:42 AM   Result Value Ref Range    Glucose (POC) 149 (H) 65 - 100 mg/dL    Performed by Denise Umaña    GLUCOSE, POC    Collection Time: 01/14/17  5:09 PM   Result Value Ref Range    Glucose (POC) 148 (H) 65 - 100 mg/dL    Performed by Denise Umaña    GLUCOSE, POC    Collection Time: 01/14/17  8:29 PM   Result Value Ref Range    Glucose (POC) 123 (H) 65 - 100 mg/dL    Performed by Emir Salgado (PCT)    PROTHROMBIN TIME + INR    Collection Time: 01/15/17  4:07 AM   Result Value Ref Range    INR 2.4 (H) 0.9 - 1.1      Prothrombin time 25.3 (H) 9.0 - 11.1 sec   CBC WITH AUTOMATED DIFF    Collection Time: 01/15/17  4:07 AM   Result Value Ref Range    WBC 8.1 3.6 - 11.0 K/uL    RBC 3.28 (L) 3.80 - 5.20 M/uL    HGB 10.4 (L) 11.5 - 16.0 g/dL    HCT 32.1 (L) 35.0 - 47.0 %    MCV 97.9 80.0 - 99.0 FL    MCH 31.7 26.0 - 34.0 PG    MCHC 32.4 30.0 - 36.5 g/dL    RDW 12.9 11.5 - 14.5 %    PLATELET 081 207 - 138 K/uL    NEUTROPHILS 62 32 - 75 %    LYMPHOCYTES 25 12 - 49 %    MONOCYTES 8 5 - 13 %    EOSINOPHILS 4 0 - 7 %    BASOPHILS 1 0 - 1 %    ABS. NEUTROPHILS 5.1 1.8 - 8.0 K/UL    ABS. LYMPHOCYTES 2.0 0.8 - 3.5 K/UL    ABS. MONOCYTES 0.7 0.0 - 1.0 K/UL    ABS. EOSINOPHILS 0.3 0.0 - 0.4 K/UL    ABS.  BASOPHILS 0.0 0.0 - 0.1 K/UL   METABOLIC PANEL, BASIC    Collection Time: 01/15/17  4:07 AM   Result Value Ref Range    Sodium 137 136 - 145 mmol/L    Potassium 4.2 3.5 - 5.1 mmol/L    Chloride 106 97 - 108 mmol/L    CO2 27 21 - 32 mmol/L    Anion gap 4 (L) 5 - 15 mmol/L    Glucose 94 65 - 100 mg/dL    BUN 16 6 - 20 MG/DL    Creatinine 0.82 0.55 - 1.02 MG/DL    BUN/Creatinine ratio 20 12 - 20      GFR est AA >60 >60 ml/min/1.73m2    GFR est non-AA >60 >60 ml/min/1.73m2    Calcium 8.0 (L) 8.5 - 10.1 MG/DL   GLUCOSE, POC    Collection Time: 01/15/17  7:28 AM   Result Value Ref Range    Glucose (POC) 100 65 - 100 mg/dL    Performed by Alannah Meehan        Medications reviewed  Current Facility-Administered Medications   Medication Dose Route Frequency    bisacodyl (DULCOLAX) suppository 10 mg  10 mg Rectal DAILY PRN    albuterol (ACCUNEB) nebulizer solution 1.25 mg  1.25 mg Nebulization Q4H PRN    bimatoprost (LUMIGAN) 0.01 % ophthalmic drops 1 Drop  1 Drop Right Eye QHS    brimonidine (ALPHAGAN P) 0.1 % ophthalmic solution 1 Drop  1 Drop Right Eye BID    dorzolamide-timolol (COSOPT) 22.3-6.8 mg/mL ophthalmic solution 1 Drop  1 Drop Right Eye BID    memantine ER (NAMENDA XR) capsule 28 mg  28 mg Oral DAILY    QUEtiapine (SEROquel) tablet 12.5 mg  12.5 mg Oral QHS    naloxone (NARCAN) injection 0.4 mg  0.4 mg IntraVENous PRN    0.9% sodium chloride infusion  50 mL/hr IntraVENous CONTINUOUS    acetaminophen (TYLENOL) tablet 650 mg  650 mg Oral Q4H PRN    HYDROcodone-acetaminophen (NORCO) 5-325 mg per tablet 1 Tab  1 Tab Oral Q4H PRN    diphenhydrAMINE (BENADRYL) capsule 25 mg  25 mg Oral Q4H PRN    ondansetron (ZOFRAN) injection 4 mg  4 mg IntraVENous Q4H PRN    metoprolol tartrate (LOPRESSOR) tablet 75 mg  75 mg Oral BID    disopyramide phosphate (NORPACE) capsule 100 mg  100 mg Oral BID    Warfarin- Pharmacy to dose daily   Other Rx Dosing/Monitoring           Bella Santiago MD

## 2017-01-15 NOTE — PROGRESS NOTES
[facility] Dosing Services: 1/14/2017    Consult for Warfarin Dosing by Pharmacy by Dr. Forrest Barba provided for this 68 y.o. female for indication of Venous Thrombosis. Day of Therapy continued from home 5mg Monday, Tuesday, Wednesday ,Thursday, and Friday. 6mg on Saturday and Sunday. INR 1.3 on admission. Dose to achieve an INR goal of 2-3    Order entered for  Warfarin  5 (mg) ordered to be given today at 18:00. Significant drug interactions: none  Previous dose given 5 mg   PT/INR Lab Results   Component Value Date/Time    INR 2.4 01/15/2017 04:07 AM    INR (POC) 1.0 10/24/2016 06:33 PM    INR POC 2.2 05/28/2015 01:01 PM      Platelets Lab Results   Component Value Date/Time    PLATELET 182 32/68/0897 04:07 AM      H/H Lab Results   Component Value Date/Time    HGB 10.4 01/15/2017 04:07 AM        Pharmacy to follow daily and will provide subsequent Warfarin dosing based on clinical status.   [facility] [place])  Contact information [phone]

## 2017-01-15 NOTE — PROGRESS NOTES
Bedside and Verbal shift change report given to Mindi Aj RN (oncoming nurse) by Angel Dodge RN (offgoing nurse). Report included the following information SBAR, Kardex, ED Summary, MAR, Accordion and Recent Results.

## 2017-01-15 NOTE — PROGRESS NOTES
Bedside and Verbal shift change report given to William RN (oncoming nurse) by Brendan Whiting RN (offgoing nurse). Report included the following information SBAR, Kardex, Intake/Output, MAR, Accordion and Recent Results.

## 2017-01-16 LAB
ANION GAP BLD CALC-SCNC: 5 MMOL/L (ref 5–15)
BUN SERPL-MCNC: 16 MG/DL (ref 6–20)
BUN/CREAT SERPL: 19 (ref 12–20)
CALCIUM SERPL-MCNC: 8 MG/DL (ref 8.5–10.1)
CHLORIDE SERPL-SCNC: 103 MMOL/L (ref 97–108)
CO2 SERPL-SCNC: 28 MMOL/L (ref 21–32)
CREAT SERPL-MCNC: 0.83 MG/DL (ref 0.55–1.02)
ERYTHROCYTE [DISTWIDTH] IN BLOOD BY AUTOMATED COUNT: 13 % (ref 11.5–14.5)
GLUCOSE BLD STRIP.AUTO-MCNC: 104 MG/DL (ref 65–100)
GLUCOSE BLD STRIP.AUTO-MCNC: 129 MG/DL (ref 65–100)
GLUCOSE BLD STRIP.AUTO-MCNC: 151 MG/DL (ref 65–100)
GLUCOSE BLD STRIP.AUTO-MCNC: 168 MG/DL (ref 65–100)
GLUCOSE SERPL-MCNC: 109 MG/DL (ref 65–100)
HCT VFR BLD AUTO: 32.3 % (ref 35–47)
HGB BLD-MCNC: 10.6 G/DL (ref 11.5–16)
INR PPP: 3.1 (ref 0.9–1.1)
MCH RBC QN AUTO: 32 PG (ref 26–34)
MCHC RBC AUTO-ENTMCNC: 32.8 G/DL (ref 30–36.5)
MCV RBC AUTO: 97.6 FL (ref 80–99)
PLATELET # BLD AUTO: 312 K/UL (ref 150–400)
POTASSIUM SERPL-SCNC: 4.3 MMOL/L (ref 3.5–5.1)
PROTHROMBIN TIME: 32.3 SEC (ref 9–11.1)
RBC # BLD AUTO: 3.31 M/UL (ref 3.8–5.2)
SERVICE CMNT-IMP: ABNORMAL
SODIUM SERPL-SCNC: 136 MMOL/L (ref 136–145)
WBC # BLD AUTO: 9.6 K/UL (ref 3.6–11)

## 2017-01-16 PROCEDURE — 36415 COLL VENOUS BLD VENIPUNCTURE: CPT | Performed by: INTERNAL MEDICINE

## 2017-01-16 PROCEDURE — 74011250637 HC RX REV CODE- 250/637: Performed by: INTERNAL MEDICINE

## 2017-01-16 PROCEDURE — 85027 COMPLETE CBC AUTOMATED: CPT | Performed by: INTERNAL MEDICINE

## 2017-01-16 PROCEDURE — 82962 GLUCOSE BLOOD TEST: CPT

## 2017-01-16 PROCEDURE — 85610 PROTHROMBIN TIME: CPT | Performed by: INTERNAL MEDICINE

## 2017-01-16 PROCEDURE — 80048 BASIC METABOLIC PNL TOTAL CA: CPT | Performed by: FAMILY MEDICINE

## 2017-01-16 PROCEDURE — 65660000000 HC RM CCU STEPDOWN

## 2017-01-16 RX ADMIN — BIMATOPROST 1 DROP: 0.1 SOLUTION/ DROPS OPHTHALMIC at 22:35

## 2017-01-16 RX ADMIN — DORZOLAMIDE HYDROCHLORIDE AND TIMOLOL MALEATE 1 DROP: 20; 5 SOLUTION/ DROPS OPHTHALMIC at 20:39

## 2017-01-16 RX ADMIN — BRIMONIDINE TARTRATE 1 DROP: 1 SOLUTION/ DROPS OPHTHALMIC at 20:39

## 2017-01-16 RX ADMIN — METOPROLOL TARTRATE 75 MG: 25 TABLET ORAL at 20:40

## 2017-01-16 RX ADMIN — QUETIAPINE FUMARATE 12.5 MG: 25 TABLET, FILM COATED ORAL at 20:40

## 2017-01-16 RX ADMIN — MEMANTINE HYDROCHLORIDE 28 MG: 28 CAPSULE, EXTENDED RELEASE ORAL at 09:44

## 2017-01-16 RX ADMIN — BRIMONIDINE TARTRATE 1 DROP: 1 SOLUTION/ DROPS OPHTHALMIC at 09:44

## 2017-01-16 RX ADMIN — METOPROLOL TARTRATE 75 MG: 25 TABLET ORAL at 09:44

## 2017-01-16 RX ADMIN — DISOPYRAMIDE PHOSPHATE 100 MG: 100 CAPSULE ORAL at 09:44

## 2017-01-16 RX ADMIN — DORZOLAMIDE HYDROCHLORIDE AND TIMOLOL MALEATE 1 DROP: 20; 5 SOLUTION/ DROPS OPHTHALMIC at 09:44

## 2017-01-16 RX ADMIN — DISOPYRAMIDE PHOSPHATE 100 MG: 100 CAPSULE ORAL at 20:39

## 2017-01-16 NOTE — PROGRESS NOTES
Daily Progress Note: 1/16/2017  Felicia Hampton MD    Assessment/Plan:   Deep venous thrombosis - New acute and extensive. --pharmacy dosing coumadin       ARF (acute renal failure) / Dehydration - POA, likely due to poor PO intake. Hydrate. Hold PO K and hold Bumex  --Cr back to baseline, K normal     Hypertrophic cardiomyopathy / Chronic diastolic heart failure -no exacerbation  -Hold bumex, continue metoprolol, home norpace     Dementia / Cerebral atrophy - Severe. Continue seroquel and memantine.     Paroxysmal atrial fibrillation / CAD (coronary artery disease) - No acute symptoms. Continue BB. Not on ASA. Hold statin.     Glaucoma - Continue lumigan, alphagan and cospot     Hyperlipidemia- Given age and co morbidities, I would not test or treat hyperlipidemia     Weakness due to cerebrovascular accident - Fall precautions. Pureed and nector diet. She is at baseline, making no improvement since stroke.  Family wants out of SNF, and into LTC      No UTI - Colonization with VRE   - repeat as cath specimen           Problem List:  Problem List as of 1/16/2017  Date Reviewed: 1/9/2017          Codes Class Noted - Resolved    Deep venous thrombosis ([facility] ICD-10-CM: I82.409  ICD-9-CM: 453.40  1/9/2017 - Present        CAD (coronary artery disease) ICD-10-CM: I25.10  ICD-9-CM: 414.00  Unknown - Present        Dehydration ICD-10-CM: E86.0  ICD-9-CM: 276.51  1/9/2017 - Present        ARF (acute renal failure) ([facility] ICD-10-CM: N17.9  ICD-9-CM: 584.9  1/9/2017 - Present        Weakness due to cerebrovascular accident (CVA) ([facility] ICD-10-CM: I63.9, R53.1  ICD-9-CM: 434.91, 780.79  10/26/2016 - Present        Goals of care, counseling/discussion ICD-10-CM: Z71.89  ICD-9-CM: V65.49  10/26/2016 - Present        Hyperlipidemia LDL goal <70 ICD-10-CM: E78.5  ICD-9-CM: 272.4  10/25/2016 - Present        Cerebral atrophy ICD-10-CM: G31.9  ICD-9-CM: 331.9  10/25/2016 - Present        Dementia ICD-10-CM: F03.90  ICD-9-CM: 294.20  10/25/2016 - Present        Glaucoma ICD-10-CM: H40.9  ICD-9-CM: 365.9  1/13/2014 - Present        Hypertrophic cardiomyopathy ([facility] ICD-10-CM: I42.2  ICD-9-CM: 425.18  6/1/2012 - Present        Paroxysmal atrial fibrillation ([facility] ICD-10-CM: I48.0  ICD-9-CM: 427.31  11/29/2011 - Present        Chronic diastolic heart failure (HCC) ICD-10-CM: I50.32  ICD-9-CM: 428.32  2/26/2011 - Present        RESOLVED: Carotid occlusion, left ICD-10-CM: I65.22  ICD-9-CM: 433.10  10/25/2016 - 1/9/2017        RESOLVED: Acute CVA (cerebrovascular accident) ([facility] ICD-10-CM: I63.9  ICD-9-CM: 434.91  10/24/2016 - 1/9/2017        RESOLVED: Orthostatic hypotension ICD-10-CM: I95.1  ICD-9-CM: 458.0  10/28/2015 - 1/9/2017        RESOLVED: Acute embolic stroke ([facility] [id number]-[id number]: I63.9  ICD-9-CM: 434.11  6/26/2015 - 1/9/2017        RESOLVED: Dyslipidemia ICD-10-CM: E78.5  ICD-9-CM: 272.4  6/25/2015 - 1/9/2017        RESOLVED: Pulmonary nodule ICD-10-CM: R91.1  ICD-9-CM: 793.11  6/25/2015 - 1/9/2017        RESOLVED: Receptive aphasia ICD-10-CM: R47.01  ICD-9-CM: 784.3  6/25/2015 - 8/23/2015        RESOLVED: Dysarthria ICD-10-CM: R47.1  ICD-9-CM: 784.51  6/25/2015 - 8/23/2015        RESOLVED: Expressive aphasia ICD-10-CM: R47.01  ICD-9-CM: 784.3  6/25/2015 - 8/23/2015        RESOLVED: ADHF (acute decompensated heart failure) ([facility] ICD-10-CM: I50.9  ICD-9-CM: 428.0  6/24/2015 - 8/23/2015        RESOLVED: Cerebral ventriculomegaly due to brain atrophy ICD-10-CM: G31.9  ICD-9-CM: 331.9  6/24/2015 - 8/23/2015        RESOLVED: Left-sided visual neglect ICD-10-CM: R41.4  ICD-9-CM: 781.8  6/24/2015 - 8/23/2015        RESOLVED: Chest pain ICD-10-CM: R07.9  ICD-9-CM: 786.50  6/23/2015 - 8/23/2015        RESOLVED: RYAN (dyspnea on exertion) ICD-10-CM: R06.09  ICD-9-CM: 786.09  5/22/2015 - 8/23/2015        RESOLVED: Chest tightness ICD-10-CM: R07.89  ICD-9-CM: 786.59  5/22/2015 - 8/23/2015        RESOLVED: Anemia ICD-10-CM: D64.9  ICD-9-CM: 285.9  1/29/2014 - 1/9/2017        RESOLVED: Supratherapeutic INR ICD-10-CM: R79.1  ICD-9-CM: 790.92  1/13/2014 - 1/29/2014        RESOLVED: Melena ICD-10-CM: K92.1  ICD-9-CM: 578.1  1/13/2014 - 1/29/2014        RESOLVED: Macrocytic anemia ICD-10-CM: D53.9  ICD-9-CM: 281.9  1/13/2014 - 1/29/2014        RESOLVED: GI bleed ICD-10-CM: K92.2  ICD-9-CM: 578.9  1/13/2014 - 1/29/2014        RESOLVED: Cough ICD-10-CM: R05  ICD-9-CM: 786.2  1/13/2014 - 1/29/2014        RESOLVED: Diarrhea ICD-10-CM: R19.7  ICD-9-CM: 787.91  1/13/2014 - 1/29/2014        RESOLVED: Coffee ground emesis ICD-10-CM: K92.0  ICD-9-CM: 578.0  1/13/2014 - 1/29/2014        RESOLVED: Left flank pain ICD-10-CM: R10.9  ICD-9-CM: 789.09  1/13/2014 - 1/29/2014        RESOLVED: Weakness generalized ICD-10-CM: R53.1  ICD-9-CM: 780.79  1/13/2014 - 8/23/2015        RESOLVED: Hypoxia ICD-10-CM: R09.02  ICD-9-CM: 799.02  1/13/2014 - 1/29/2014        RESOLVED: CAP (community acquired pneumonia) ICD-10-CM: J18.9  ICD-9-CM: 299  1/13/2014 - 1/29/2014        RESOLVED: Pericardial effusion ICD-10-CM: I31.3  ICD-9-CM: 423.9  1/13/2014 - 1/29/2014        RESOLVED: Elevated blood pressure reading without diagnosis of hypertension ICD-10-CM: R03.0  ICD-9-CM: 796.2  1/13/2014 - 1/29/2014        RESOLVED: Mitral regurgitation ICD-10-CM: I34.0  ICD-9-CM: 424.0  10/22/2010 - 1/9/2017              Subjective:    68 y.o.  female who presented to the Emergency Department complaining of leg swelling. She provides no hx. Family is present. I have reviewed records. A few months ago she had a CVA, and has been persistently more immobile since. She has severe dementia and other medical issues. Family noted LLE swelling yesterday, and she had mild LLE discomfort today. She is supposed to be on coumadin for Afib, but her INR is only 1.3. ER workup shows extensive LLE DVT and ARF. 1/10:  Dopplers revealed extensive DVT LLE.   Pt poorly responsive at baseline since CVA - now seems worse. Lovenox until coumadin therapeutic.     :  Little change from yesterday and remains poorly responsive. She is stable for [facility].     : More alert today. Nursing reports she was more alert yesterday and took diet well. She is stable for [facility].     : Sleeping but arousable. Case management working on placement. She is stable for d/c.    : Patient is awake and alert this morning. She looks comfortable. Able to say yes/no a few times but unreliable answers. Believes she is at home. Does not seem to be in pain. No acute events overnight. 1/15: No acute events overnight. Patient answers simple yes/no questions but unreliable. :  Little change. Alert but not oriented at all. Seems to be in no distress. Stable for [place]. Review of Systems:   Review of systems not obtained due to patient factors. Objective:   Physical Exam:     Visit Vitals    /64 (BP 1 Location: Right arm, BP Patient Position: At rest)    Pulse 79    Temp 98.2 °F (36.8 °C)    Resp 16    Ht 5' 4\" (1.626 m)    Wt 136 lb (61.7 kg)    SpO2 95%    Breastfeeding No    BMI 23.34 kg/m2      O2 Device: Room air    Temp (24hrs), Av.2 °F (36.8 °C), Min:98 °F (36.7 °C), Max:98.2 °F (36.8 °C)         1901 -  0700  In: -   Out: 1050 [Urine:1050]    General:   awake and alert   Head:  Normocephalic, without obvious abnormality, atraumatic. Eyes:  Conjunctivae/corneas clear. EOMs intact. Nose: Nares normal. Septum midline. Mucosa normal. No drainage or sinus tenderness. Throat: Lips, mucosa, and tongue moist..   Neck: Supple, symmetrical, trachea midline   Lungs:   Ctab, no w/r/r   Heart:  Regular rate and rhythm, S1, S2 normal, 2/6 murmur loudest axilla, apex and LSB. No click, rub or gallop. Abdomen:   Soft, apparently non-tender. Bowel sounds normal. No masses,  No organomegaly. Extremities: no cyanosis or RLE edema; LLE with trace to 1+ edema.    Skin: Skin color, texture, turgor normal. No rashes or lesions   Neurologic:  Alert and oriented X 0        Data Review:     Jong LE Dopplers 1/9/17:  CONCLUSION: Left lower extremity venous duplex positive for acute deep  venous thrombosis and thrombophlebitis throughout the left leg from  external iliac vein down through the calf. Right common femoral vein  is thrombus free.     Recent Days:  Recent Labs      01/16/17   0322  01/15/17   0407   WBC  9.6  8.1   HGB  10.6*  10.4*   HCT  32.3*  32.1*   PLT  312  277     Recent Labs      01/16/17   0318  01/15/17   0407   NA  136  137   K  4.3  4.2   CL  103  106   CO2  28  27   GLU  109*  94   BUN  16  16   CREA  0.83  0.82   CA  8.0*  8.0*   INR  3.1*  2.4*     No results for input(s): PH, PCO2, PO2, HCO3, FIO2 in the last 72 hours.     24 Hour Results:  Recent Results (from the past 24 hour(s))   GLUCOSE, POC    Collection Time: 01/15/17  7:28 AM   Result Value Ref Range    Glucose (POC) 100 65 - 100 mg/dL    Performed by Ervin Marcus    GLUCOSE, POC    Collection Time: 01/15/17 11:11 AM   Result Value Ref Range    Glucose (POC) 89 65 - 100 mg/dL    Performed by Ervin Marcus    GLUCOSE, POC    Collection Time: 01/15/17  4:37 PM   Result Value Ref Range    Glucose (POC) 131 (H) 65 - 100 mg/dL    Performed by Ervin Marcus    GLUCOSE, POC    Collection Time: 01/15/17  9:30 PM   Result Value Ref Range    Glucose (POC) 129 (H) 65 - 100 mg/dL    Performed by Liz Quintana (PCT)    PROTHROMBIN TIME + INR    Collection Time: 01/16/17  3:18 AM   Result Value Ref Range    INR 3.1 (H) 0.9 - 1.1      Prothrombin time 32.3 (H) 9.0 - 04.0 sec   METABOLIC PANEL, BASIC    Collection Time: 01/16/17  3:18 AM   Result Value Ref Range    Sodium 136 136 - 145 mmol/L    Potassium 4.3 3.5 - 5.1 mmol/L    Chloride 103 97 - 108 mmol/L    CO2 28 21 - 32 mmol/L    Anion gap 5 5 - 15 mmol/L    Glucose 109 (H) 65 - 100 mg/dL    BUN 16 6 - 20 MG/DL    Creatinine 0.83 0.55 - 1.02 MG/DL BUN/Creatinine ratio 19 12 - 20      GFR est AA >60 >60 ml/min/1.73m2    GFR est non-AA >60 >60 ml/min/1.73m2    Calcium 8.0 (L) 8.5 - 10.1 MG/DL   CBC W/O DIFF    Collection Time: 01/16/17  3:22 AM   Result Value Ref Range    WBC 9.6 3.6 - 11.0 K/uL    RBC 3.31 (L) 3.80 - 5.20 M/uL    HGB 10.6 (L) 11.5 - 16.0 g/dL    HCT 32.3 (L) 35.0 - 47.0 %    MCV 97.6 80.0 - 99.0 FL    MCH 32.0 26.0 - 34.0 PG    MCHC 32.8 30.0 - 36.5 g/dL    RDW 13.0 11.5 - 14.5 %    PLATELET 046 853 - 472 K/uL       Medications reviewed  Current Facility-Administered Medications   Medication Dose Route Frequency    bisacodyl (DULCOLAX) suppository 10 mg  10 mg Rectal DAILY PRN    albuterol (ACCUNEB) nebulizer solution 1.25 mg  1.25 mg Nebulization Q4H PRN    bimatoprost (LUMIGAN) 0.01 % ophthalmic drops 1 Drop  1 Drop Right Eye QHS    brimonidine (ALPHAGAN P) 0.1 % ophthalmic solution 1 Drop  1 Drop Right Eye BID    dorzolamide-timolol (COSOPT) 22.3-6.8 mg/mL ophthalmic solution 1 Drop  1 Drop Right Eye BID    memantine ER (NAMENDA XR) capsule 28 mg  28 mg Oral DAILY    QUEtiapine (SEROquel) tablet 12.5 mg  12.5 mg Oral QHS    naloxone (NARCAN) injection 0.4 mg  0.4 mg IntraVENous PRN    0.9% sodium chloride infusion  50 mL/hr IntraVENous CONTINUOUS    acetaminophen (TYLENOL) tablet 650 mg  650 mg Oral Q4H PRN    HYDROcodone-acetaminophen (NORCO) 5-325 mg per tablet 1 Tab  1 Tab Oral Q4H PRN    diphenhydrAMINE (BENADRYL) capsule 25 mg  25 mg Oral Q4H PRN    ondansetron (ZOFRAN) injection 4 mg  4 mg IntraVENous Q4H PRN    metoprolol tartrate (LOPRESSOR) tablet 75 mg  75 mg Oral BID    disopyramide phosphate (NORPACE) capsule 100 mg  100 mg Oral BID    Warfarin- Pharmacy to dose daily   Other Rx Dosing/Monitoring           Hao Lawler MD

## 2017-01-16 NOTE — PROGRESS NOTES
[facility] Dosing Services: 1/16/2017     Consult for Warfarin Dosing by Pharmacy by Dr. Crawford Mineralwells provided for this 68 y.o. female for indication of Venous Thrombosis. Day of Therapy: continued from home 5mg Monday, Tuesday, Wednesday ,Thursday, and Friday. 6mg on Saturday and Sunday. INR 1.3 on admission. Dose to achieve an INR goal of 2-3     INR rapidly increased from 2.4 to 3.1 overnight    HOLD dose for 1/16/17 for SUPRAthereapeutic INR and since INR increased significantly within 24 hours     Significant drug interactions: none  Previous dose given Warfarin 5 mg PO x 1 on 1/15/17   PT/INR Lab Results   Component Value Date/Time    INR 3.1 01/16/2017 03:18 AM    INR (POC) 1.0 10/24/2016 06:33 PM    INR POC 2.2 05/28/2015 01:01 PM      Platelets Lab Results   Component Value Date/Time    PLATELET 384 52/50/1389 03:22 AM      H/H Lab Results   Component Value Date/Time    HGB 10.6 01/16/2017 03:22 AM        Pharmacy to follow daily and will provide subsequent Warfarin dosing based on clinical status. Thank you for the consult,  Trey Joseph

## 2017-01-16 NOTE — PROGRESS NOTES
Bedside and Verbal shift change report given to [address] [place] [address] (oncoming nurse) by Gisell Ochoa RN (offgoing nurse). Report included the following information SBAR, Kardex, ED Summary, Procedure Summary, Intake/Output, MAR, Recent Results and Med Rec Status.

## 2017-01-16 NOTE — PROGRESS NOTES
Bedside and Verbal shift change report given to Gretchen Talavera RN (oncoming nurse) by Beverly Edouard RN (offgoing nurse). Report included the following information SBAR, Kardex, Intake/Output, MAR, Accordion, Recent Results and Med Rec Status.

## 2017-01-16 NOTE — PROGRESS NOTES
CM Note:  Met with family to give an update on snf referrals. Their fist choice is [facility]. The other snf is [facility]. Timothy Rodas from [place] met with the family and has accepted the pt. [employer] is closed today. Will likely obtain auth tomorrow.   ANUJA Haider

## 2017-01-16 NOTE — INTERDISCIPLINARY ROUNDS
Interdisciplinary team rounds were held 1/16/2017 with the following team members:Care Management, Nursing, Nutrition, Pharmacy, Physical Therapy, and Clinical Coordinator. Plan of care discussed. See clinical pathway and/or care plan for interventions and desired outcomes.     Anticipated discharge: pending placement

## 2017-01-17 LAB
GLUCOSE BLD STRIP.AUTO-MCNC: 108 MG/DL (ref 65–100)
GLUCOSE BLD STRIP.AUTO-MCNC: 126 MG/DL (ref 65–100)
GLUCOSE BLD STRIP.AUTO-MCNC: 129 MG/DL (ref 65–100)
GLUCOSE BLD STRIP.AUTO-MCNC: 133 MG/DL (ref 65–100)
INR PPP: 3.1 (ref 0.9–1.1)
PROTHROMBIN TIME: 32.7 SEC (ref 9–11.1)
SERVICE CMNT-IMP: ABNORMAL

## 2017-01-17 PROCEDURE — 97530 THERAPEUTIC ACTIVITIES: CPT

## 2017-01-17 PROCEDURE — 97535 SELF CARE MNGMENT TRAINING: CPT

## 2017-01-17 PROCEDURE — 74011250637 HC RX REV CODE- 250/637: Performed by: INTERNAL MEDICINE

## 2017-01-17 PROCEDURE — 97165 OT EVAL LOW COMPLEX 30 MIN: CPT

## 2017-01-17 PROCEDURE — 97163 PT EVAL HIGH COMPLEX 45 MIN: CPT

## 2017-01-17 PROCEDURE — 65660000000 HC RM CCU STEPDOWN

## 2017-01-17 PROCEDURE — 82962 GLUCOSE BLOOD TEST: CPT

## 2017-01-17 PROCEDURE — 36415 COLL VENOUS BLD VENIPUNCTURE: CPT | Performed by: INTERNAL MEDICINE

## 2017-01-17 PROCEDURE — 85610 PROTHROMBIN TIME: CPT | Performed by: INTERNAL MEDICINE

## 2017-01-17 RX ORDER — WARFARIN 2 MG/1
4 TABLET ORAL ONCE
Status: COMPLETED | OUTPATIENT
Start: 2017-01-17 | End: 2017-01-17

## 2017-01-17 RX ADMIN — WARFARIN SODIUM 4 MG: 2 TABLET ORAL at 17:14

## 2017-01-17 RX ADMIN — ACETAMINOPHEN 650 MG: 500 TABLET ORAL at 21:33

## 2017-01-17 RX ADMIN — BRIMONIDINE TARTRATE 1 DROP: 1 SOLUTION/ DROPS OPHTHALMIC at 09:34

## 2017-01-17 RX ADMIN — MEMANTINE HYDROCHLORIDE 28 MG: 28 CAPSULE, EXTENDED RELEASE ORAL at 09:31

## 2017-01-17 RX ADMIN — DISOPYRAMIDE PHOSPHATE 100 MG: 100 CAPSULE ORAL at 21:34

## 2017-01-17 RX ADMIN — DISOPYRAMIDE PHOSPHATE 100 MG: 100 CAPSULE ORAL at 09:32

## 2017-01-17 RX ADMIN — BIMATOPROST 1 DROP: 0.1 SOLUTION/ DROPS OPHTHALMIC at 22:00

## 2017-01-17 RX ADMIN — DORZOLAMIDE HYDROCHLORIDE AND TIMOLOL MALEATE 1 DROP: 20; 5 SOLUTION/ DROPS OPHTHALMIC at 09:34

## 2017-01-17 RX ADMIN — BRIMONIDINE TARTRATE 1 DROP: 1 SOLUTION/ DROPS OPHTHALMIC at 21:00

## 2017-01-17 RX ADMIN — METOPROLOL TARTRATE 75 MG: 25 TABLET ORAL at 09:31

## 2017-01-17 RX ADMIN — DORZOLAMIDE HYDROCHLORIDE AND TIMOLOL MALEATE 1 DROP: 20; 5 SOLUTION/ DROPS OPHTHALMIC at 21:00

## 2017-01-17 RX ADMIN — METOPROLOL TARTRATE 75 MG: 25 TABLET ORAL at 21:37

## 2017-01-17 RX ADMIN — QUETIAPINE FUMARATE 12.5 MG: 25 TABLET, FILM COATED ORAL at 21:34

## 2017-01-17 NOTE — PROGRESS NOTES
1526:  Pt's granddaughter, Valeen Galeazzi, called and wanted to know the reason the pt was denied at [facility]. She wanted to know who I talked to at [facility]. She was provided with the answers. The granddtr contacted [facility] and spoke to admissions. It was explained to her why pt was denied. ANUJA Haider    CM Note:  [facility] unable to accept pt per administration due to family complaint on social media and media regarding pt's stay at another facility. PT/OT notes sent in [facility] to [facility]. Awaiting reply.   ANUJA Haider

## 2017-01-17 NOTE — ROUTINE PROCESS
Bedside and Verbal shift change report given to Telma Rodriguez RN (oncoming nurse) by Jacqueline Brizuela RN (offgoing nurse). Report included the following information SBAR, Kardex, ED Summary, Intake/Output, MAR and Recent Results.

## 2017-01-17 NOTE — PROGRESS NOTES
Music Therapy Assessment    Aida Shore [id number]  [id number]    1940  68 y.o.  female    Patient Telephone Number: [phone] (home)   L[employer]-day Affiliation: [place]   Language: English   Extended Emergency Contact Information  Primary Emergency Contact: [address] Phone: [phone]  Mobile Phone: [phone]  Relation: Spouse  Secondary Emergency Contact: Roverto Valdovinos Phone: [phone]  Relation: Other Relative   Patient Active Problem List    Diagnosis Date Noted    [facility] 01/09/2017    Dehydration 01/09/2017    ARF (acute renal failure) ([facility] 01/09/2017    CAD (coronary artery disease)     Weakness due to cerebrovascular accident (CVA) ([facility] 10/26/2016    Goals of care, counseling/discussion 10/26/2016    Hyperlipidemia LDL goal <70 10/25/2016    Cerebral atrophy 10/25/2016    Dementia 10/25/2016    Glaucoma 01/13/2014    [facility]pert[facility] ([facility] 06/01/2012    Paroxysmal atrial fibrillation ([facility] 11/29/2011    Chronic diastolic heart failure ([facility] 02/26/2011        Date: 1/17/2017       Mental Status:   [x] Alert [  ] Coretha Pencil [  ]  Confused     Communication Status: [x] Impaired Speech [  ] Nonverbal     Physical Status:   [  ] Hard of Hearing [  ] Oxygen in use [  ] Ambulatory   [  ] Ambulatory with assistance  [  ] Non-ambulatory -N/A    Music Preferences, Background: Gospel. Clinical Problem addressed: Pt/family coping, spiritual support, improve/enhance mood. Goal(s) met in session:  Physical/Pain management (Scale of 1-10):    Pre-session rating: Pt's spouse denied pain for the pt. Post-session rating: No change reported or observed.    [  ] Increased relaxation   [  ] Regulated breathing patterns  [  ] Minimized physical distress   [  ] Decreased nausea discomfort     Emotional/Psychological:  Expression of feelings _____________ [  ] Decreased aggressive behavior   [  ] Decreased sadness   [  ] Increased range of coping skills     [x] Improved mood    [  ] Decreased withdrawn behavior     Social:  [  ] Decreased feelings of isolation  [x] Positive social interaction   [x] Increased feelings of comfort and/or support for family    Spiritual:  [x] Spiritual support    [  ] Expressed peace     Techniques Utilized (Check all that apply):   [  ] Procedural support MT [  ] Music for relaxation [x] Patient preferred music  [  ] Mei analysis  [  ] Song choice  [  ] Music for validation  [  ] Music listening  [  ] Progressive relaxation [  ] Guided imagery  [  ] Dionisio Xiong  [  ] TIMP   [  ] Gale Show writing  [  ] Silke Wood along   [  ] Bradley Hodgkins  [  ] Sensory stimulation  [x] Active Listening  [x] Music for spiritual support [  ] Making of CDs as gifts    Session Observations:  Referral from Dr. Ty Valdez, Palliative. This music therapist (MT) spoke with the patient's daughter-in-law Shahid Miller over the phone. Sanya Hurst shared the patient's music preferences with the MT and expressed agreement to the pt being offered music therapy. The patient (pt) was observed to be comfortably lying in bed with her spouse (sp) Russ Jones sitting in a chair at bedside. The MT greeted them and asked the pt a yes/no question Yanique Lingas you having any pain? \"). The pt's sp said the pt doesn't consistently understand everything she's asked and may no be able to answer appropriately. He said her leg has been swollen but denied pain for her. The MT shared with them about speaking with their daughter-in-law and the pt's sp confirmed the pt's music preferences. The pt's sp shared that it's been a difficult time for them lately, and that their only daughter passed away about three years ago. The MT provided active listening and words of condolence, and then sang and played [employer] with [employer].  The pt increased positive social interaction with the MT, as evidenced by (AEB) engaging in eye contact with the MT at times throughout the song. The pt's sp expressed enjoyment in the music afterward, and his sp nodded her head yes as if in agreement. The MT sang and played Elfida Leopard, then [employer] by the [facility], and then [employer]. The pt's affect brightened in response to the music, AEB continuing to engage in eye contact and moving her foot to the beat of each song at times. Her sp's affect also brightened in response to seeing his sp's foot moving. He expressed much enjoyment in the music and asked the pt if she enjoyed the music. She didn't reply, and he repeated the question. The second time, the pt nodded her head yes and said, [place]. \" The pt's sp expressed gratitude for the session. Will follow as able.     APPLE PachecoBC (Music Therapist-Board Certified)  Spiritual Care Department  Referral-based service

## 2017-01-17 NOTE — PROGRESS NOTES
Problem: Mobility Impaired (Adult and Pediatric)  Goal: *Acute Goals and Plan of Care (Insert Text)  Physical Therapy Goals  Initiated 1/17/2017  1. Patient will move from supine to sit and sit to supine in bed with moderate assistance within 7 day(s). 2. Patient will tolerate sitting up in chair position of bed for 1.5 hour duration for improved upright activity within 7 days. 3. Patient will tolerate wilfredo transfer to chair with MAX A within 7 days. PHYSICAL THERAPY EVALUATION  Patient: Jenny Lubin (77 y.o. female)  Date: 1/17/2017  Primary Diagnosis: Deep venous thrombosis (HCC)        Precautions:   Fall      ASSESSMENT :  Based on the objective data described below, the patient presents with decreased ability to follow commands, Right sided neglect/inattention, poor strength and unable to tolerate upright activity. Patient admitted for a new, and extensive DVT in the Left LE. INR currently 3.1. She has premorbid dementia and s/p CVA with residual Right hemiparesis. Patient has multiple comorbidities that impact her overall functional status Patient prior to admission was at a SNF, she sustained a CVA in [place] of 2016. Patient currently is still requiring 2 person assist due to poor coordination and decreased initiation of activities. Patient required MAX A x2 for bed mobility. Once in sitting she is able to stabilize at sitting edge of bed and only required CGA, but with fatigue and prolonged sitting she required MIn-MOD A due to increased right sided lean. Patient has movement in bilateral UE, but not volitional and does not follow commands for actual MMT. Patient with +3 hyper spasticity in the Right LE. She was able to tolerate sitting edge of bed for OT to attempt feeding.   Patient has poor proprioception and demonstrate manipulation with the Right UE, but she requires the inititation by external force (therapist) and she is unable to release from objects being held.,  Increased extensor synergy in sitting of the Right LE. She does attend to the Right side better once in sitting. Patient is non-verbal but smiles occasionally. Yuliet Riser She would benefit from return to SNF at discharge. .     Patient will benefit from skilled intervention to address the above impairments. Patients rehabilitation potential is considered to be Fair  Factors which may influence rehabilitation potential include:   [ ]         None noted  [X]         Mental ability/status  [ ]         Medical condition  [ ]         Home/family situation and support systems  [ ]         Safety awareness  [ ]         Pain tolerance/management  [ ]         Other:        PLAN :  Recommendations and Planned Interventions:  [X]           Bed Mobility Training             [ ]    Neuromuscular Re-Education  [X]           Transfer Training                   [ ]    Orthotic/Prosthetic Training  [ ]           Gait Training                         [ ]    Modalities  [X]           Therapeutic Exercises           [ ]    Edema Management/Control  [X]           Therapeutic Activities            [X]    Patient and Family Training/Education  [ ]           Other (comment):     Frequency/Duration: Patient will be followed by physical therapy  3 times a week to address goals.   Discharge Recommendations: [facility]  Further Equipment Recommendations for Discharge: TBD at rehab       SUBJECTIVE:   Patient stated: pt non-verbal, but smiles      OBJECTIVE DATA SUMMARY:   HISTORY:    Past Medical History   Diagnosis Date    CAD (coronary artery disease)      Carotid occlusion, left 10/25/2016    Cerebral atrophy 10/25/2016    Dementia      Depression      Glaucoma      Hx of completed stroke      Hyperlipidemia LDL goal <70 10/25/2016    Hypertension      Hypertrophic cardiomyopathy ([facility] [address] 6/1/2012    Hypertrophic subaortic stenosis (idiopathic) (HCC)      Mitral regurgitation      Paroxysmal atrial fibrillation ([facility] [address] 11/29/2011 Past Surgical History   Procedure Laterality Date    Cardiac catheterization   2004       Normal cors, EF 60%, significant MR. EDP 23    Echo 2d adult   8/20/2010       HCM, Resting LVOT gradient 129 mmHg, grade 3-4 diastolic dysfunction, MAC, mod-severe MR, marked LAE.      Echo 2d adult   5/27/11       mod-severe LVH, EF 65%, grade 3-4 diastolic dysfunction, LVOT resting gradient 42 mmHg, severe LAE, mod-severe MR, PA 46 mmHg    Pr cardiac surg procedure unlist         Prior Level of Function/Home Situation:see above  Personal factors and/or comorbidities impacting plan of care:      Home Situation  Home Environment: Rehabilitation facility  # Steps to Enter: 0  Wheelchair Ramp: Yes  One/Two Story Residence: One story  Living Alone: No  Support Systems: Home care staff  Patient Expects to be Discharged to[de-identified] Rehabilitation facility  Current DME Used/Available at Home: Wheelchair     EXAMINATION/PRESENTATION/DECISION MAKING:   Critical Behavior:  Neurologic State: Alert, Confused  Orientation Level: Oriented to person  Cognition: No command following, Decreased attention/concentration, Poor safety awareness     Skin:  All exposed intact  Strength:             RLE Strength  R Hip Flexion: 1  R Knee Flexion: 1  R Knee Extension: 1  R Ankle Dorsiflexion: 1  R Ankle Plantar Flexion: 1        LLE Strength  L Hip Flexion: 3-  L Knee Flexion: 3-  L Knee Extension: 3-  L Ankle Dorsiflexion: 3-  L Ankle Plantar Flexion: 3-  Tone & Sensation:         RLE Tone  Right Leg: Hyper-reflexive  R Hip Flexion: 3  R Knee Flexion: 3  R Knee Extension: 3  R Ankle Dorsiflexion: 3                        Range Of Motion:                          Coordination:        Functional Mobility:  Bed Mobility:     Supine to Sit: Maximum assistance;Assist x2  Sit to Supine: Maximum assistance;Assist x2  Scooting: Maximum assistance;Assist x2  Transfers:  Sit to Stand: Maximum assistance;Assist x2  Stand to Sit: Maximum assistance;Assist x2 unable to achieve full erect position                 Balance:   Sitting: Impaired  Sitting - Static: Fair (occasional)  Sitting - Dynamic: Poor (constant support)  Standing: Impaired  Standing - Static: Poor  Ambulation/Gait Training:      unable              Stairs: Therapeutic Exercises:         Functional Measure:  Barthel Index:      Bathin  Bladder: 0  Bowels: 0  Groomin  Dressin  Feedin  Mobility: 0  Stairs: 0  Toilet Use: 0  Transfer (Bed to Chair and Back): 5  Total: 10         Barthel and G-code impairment scale:  Percentage of impairment CH  0% CI  1-19% CJ  20-39% CK  40-59% CL  60-79% CM  80-99% CN  100%   Barthel Score 0-100 100 99-80 79-60 59-40 20-39 1-19    0   Barthel Score 0-20 20 17-19 13-16 9-12 5-8 1-4 0      The Barthel ADL Index: Guidelines  1. The index should be used as a record of what a patient does, not as a record of what a patient could do. 2. The main aim is to establish degree of independence from any help, physical or verbal, however minor and for whatever reason. 3. The need for supervision renders the patient not independent. 4. A patient's performance should be established using the best available evidence. Asking the patient, friends/relatives and nurses are the usual sources, but direct observation and common sense are also important. However direct testing is not needed. 5. Usually the patient's performance over the preceding 24-48 hours is important, but occasionally longer periods will be relevant. 6. Middle categories imply that the patient supplies over 50 per cent of the effort. 7. Use of aids to be independent is allowed. Lena Hathaway., Barthel, D.W. ([id number]). Functional evaluation: the Barthel Index. [facility] (14)2. Lewis Finley tamera TINO Bella, Damon Colón., Quang Zhang, [address] ().  Measuring the change indisability after inpatient rehabilitation; comparison of the responsiveness of the Barthel Index and Functional Mercedes Measure. Journal of Neurology, Neurosurgery, and Psychiatry, 66(4), 890-783. GERARD Hoang, SWEETIE Epps, & Salma Guadalupe M.A. (2004.) Assessment of post-stroke quality of life in cost-effectiveness studies: The usefulness of the Barthel Index and the EuroQoL-5D. Quality of Life Research, 13, 216-38         G codes: In compliance with CMSs Claims Based Outcome Reporting, the following G-code set was chosen for this patient based on their primary functional limitation being treated: The outcome measure chosen to determine the severity of the functional limitation was the Barthel Index with a score of 10/100 which was correlated with the impairment scale. · Mobility - Walking and Moving Around:               - CURRENT STATUS:    CM - 80%-99% impaired, limited or restricted               - GOAL STATUS:           CM - 80%-99% impaired, limited or restricted               - D/C STATUS:                       ---------------To be determined---------------      Physical Therapy Evaluation Charge Determination   History Examination Presentation Decision-Making   HIGH Complexity :3+ comorbidities / personal factors will impact the outcome/ POC  HIGH Complexity : 4+ Standardized tests and measures addressing body structure, function, activity limitation and / or participation in recreation  HIGH Complexity : Unstable and unpredictable characteristics  Other outcome measures Barthel Index HIGH       Based on the above components, the patient evaluation is determined to be of the following complexity level: HIGH      Pain:  Pain Scale 1: Adult Nonverbal Pain Scale                 Activity Tolerance:   Fair- limited by premorbid CVA residuals and cognitive deficits  Please refer to the flowsheet for vital signs taken during this treatment.   After treatment:   [ ]         Patient left in no apparent distress sitting up in chair  [ ]         Patient left in no apparent distress in bed  [ ]         Call bell left within reach  [ ]         Nursing notified  [ ]         Caregiver present  [ ]         Bed alarm activated      COMMUNICATION/EDUCATION:   The patients plan of care was discussed with: Registered Nurse. [ ]         Fall prevention education was provided and the patient/caregiver indicated understanding. [ ]         Patient/family have participated as able in goal setting and plan of care. [ ]         Patient/family agree to work toward stated goals and plan of care. [ ]         Patient understands intent and goals of therapy, but is neutral about his/her participation. [ ]         Patient is unable to participate in goal setting and plan of care.      Thank you for this referral.  Tomi Pichardo, PT, DPT   Time Calculation: 37 mins

## 2017-01-17 NOTE — PROGRESS NOTES
[facility] Dosing Services: 1/17/2017     Consult for Warfarin Dosing by Pharmacy by Dr. Miko Vences provided for this 68 y.o. female for indication of Venous Thrombosis. Day of Therapy: continued from home 5mg Monday, Tuesday, Wednesday ,Thursday, and Friday. 6mg on Saturday and Sunday. INR 1.3 on admission. Dose to achieve an INR goal of 2-3     INR 3.1 again today, likely trending down since held yesterday, so will provide dose tonight    Order entered for Warfarin 4 (mg) ordered to be given today at 18:00. Significant drug interactions: none  Previous dose given Held on 1/16/17   PT/INR Lab Results   Component Value Date/Time    INR 3.1 01/17/2017 02:51 AM    INR (POC) 1.0 10/24/2016 06:33 PM    INR POC 2.2 05/28/2015 01:01 PM      Platelets Lab Results   Component Value Date/Time    PLATELET 242 03/01/1096 03:22 AM      H/H Lab Results   Component Value Date/Time    HGB 10.6 01/16/2017 03:22 AM        Pharmacy to follow daily and will provide subsequent Warfarin dosing based on clinical status. Thank you for the consult,  Gerald Das D, Trey Zamora

## 2017-01-17 NOTE — ROUTINE PROCESS
Bedside and Verbal shift change report given to Anjali Storm RN (oncoming nurse) by Eneida Rod RN (offgoing nurse). Report included the following information SBAR, Kardex, ED Summary, Intake/Output, MAR and Recent Results.

## 2017-01-17 NOTE — PROGRESS NOTES
Bedside and Verbal shift change report given to [id number] [place] [address] (oncoming nurse) by Wendy Murray RN (offgoing nurse). Report included the following information SBAR, Kardex, ED Summary, Procedure Summary, Intake/Output, MAR, Recent Results and Med Rec Status.

## 2017-01-17 NOTE — PROGRESS NOTES
Interdisciplinary team rounds were held 1/17/2017 with the following team members:Care Management, Nursing, Nutrition, Pharmacy, Physical Therapy and Physician and the Primary RN. Plan of care discussed. See clinical pathway and/or care plan for interventions and desired outcomes.     Discharge when SNF available

## 2017-01-17 NOTE — PROGRESS NOTES
Problem: Self Care Deficits Care Plan (Adult)  Goal: *Acute Goals and Plan of Care (Insert Text)  Occupational Therapy Goals  Initiated 1/17/2017  1. Patient will perform grooming with moderate assistance in supported sitting within 7 day(s). 2. Patient will perform self-feeding with minimal assistance within 7 day(s). 3. Patient will perform upper body dressing with moderate assistance within 7 day(s). 4. Patient will perform sitting EOB with CGA x 5 minutes to decrease caregiver dependence within 7 day(s). OCCUPATIONAL THERAPY EVALUATION  Patient: Vimal Hermosillo (73 y.o. female)  Date: 1/17/2017  Primary Diagnosis: Deep venous thrombosis (HCC)        Precautions: R eye blind, fall, nectar/puree         ASSESSMENT :  Nursing cleared patient for therapy. Based on the objective data described below, the patient presents with impaired functional mobility, activity tolerance, sitting balance, and L sided inattention necessary in completing ADL tasks. Admitted for DVTs, on anticoagulants currently. History of CVA with aphasia, ARF, dementia, and A fib. She was previously at SNF with assistance for ADL tasks. Family preferring to change SNF. INitially asleep, needing verbal and tactile cuing to increase alertness. Patient with impaired command follow secondary to aphasia and non-verbal during session. Completed bed mob with max A x 2, sitting EOB with CGA-min A and facilitation of RLE to remain on floor. While EOB facilitated self feeding with overall max A, difficulty with utensil management but able to hold cup to drink. Poor initiation of tasks however once assisted with initiation she was able to bring drink to mouth. Patient will benefit from skilled intervention to address the above impairments while in hospital and continued with SNF.       Patients rehabilitation potential is considered to be Good  Factors which may influence rehabilitation potential include:   [ ]             None noted  [X] Mental ability/status  [X]             Medical condition  [ ]             Home/family situation and support systems  [ ]             Safety awareness  [ ]             Pain tolerance/management  [ ]             Other:        PLAN :  Recommendations and Planned Interventions:  [X]               Self Care Training                  [X]        Therapeutic Activities  [X]               Functional Mobility Training    [ ]        Cognitive Retraining  [X]               Therapeutic Exercises           [X]        Endurance Activities  [X]               Balance Training                   [ ]        Neuromuscular Re-Education  [ ]               Visual/Perceptual Training     [X]   Home Safety Training  [X]               Patient Education                 [X]        Family Training/Education  [ ]               Other (comment):     Frequency/Duration: Patient will be followed by occupational therapy 3 times a week to address goals. Discharge Recommendations: Juan David Kwok  Further Equipment Recommendations for Discharge: TBD by SNF       SUBJECTIVE:   Patient non verbal through session. Smiling and giggling a few times with therapists. OBJECTIVE DATA SUMMARY:   HISTORY:   Past Medical History   Diagnosis Date    CAD (coronary artery disease)      Carotid occlusion, left 10/25/2016    Cerebral atrophy 10/25/2016    Dementia      Depression      Glaucoma      Hx of completed stroke      Hyperlipidemia LDL goal <70 10/25/2016    Hypertension      Hypertrophic cardiomyopathy ([facility] 6/1/2012    Hypertrophic subaortic stenosis (idiopathic) (HCC)      Mitral regurgitation      Paroxysmal atrial fibrillation ([address] 11/29/2011     Past Surgical History   Procedure Laterality Date    Cardiac catheterization   2004       Normal cors, EF 60%, significant MR. EDP 23    Echo 2d adult   8/20/2010       HCM, Resting LVOT gradient 129 mmHg, grade 3-4 diastolic dysfunction, MAC, mod-severe MR, marked LAE.      Echo 2d adult   5/27/11       mod-severe LVH, EF 65%, grade 3-4 diastolic dysfunction, LVOT resting gradient 42 mmHg, severe LAE, mod-severe MR, PA 46 mmHg    Pr cardiac surg procedure unlist            Prior Level of Function/Home Situation: SNF/LTC at the [address] [facility]  Expanded or extensive additional review of patient history: Dementia, CVA, HTN, A fib. Assistance for ADLs from nursing staff at [address] [place] [address]: Private residence (Currently rehab facility)  # Steps to Enter: 3  One/Two Story Residence: One story  Living Alone: Yes  Support Systems: Family member(s)  Patient Expects to be Discharged to[de-identified] Skilled nursing facility  Current DME Used/Available at Home: Wheelchair  [X]  Right hand dominant- changing drink from L to R when therapist handed drink to L hand              [ ]  Left hand dominant     EXAMINATION OF PERFORMANCE DEFICITS:  Cognitive/Behavioral Status:  Neurologic State: Alert;Confused  Orientation Level: Oriented to person  Cognition: No command following;Decreased attention/concentration;Poor safety awareness     Skin: uppers intact     Edema: uppers none     Vision/Perceptual:    Unable to assess secondary to impaired command following. Acuity:  (per chart blind in R eye)       Range of Motion:  BUE: AAROM WDL        Strength:  BUE: 3+ to 4/5 overall         Coordination:     Fine Motor Skills-Upper: Left Intact; Right Intact (impaired pruposefulness)    Gross Motor Skills-Upper: Left Intact; Right Intact (impaired purposefulness)  Tone & Sensation:  Sensation: RUE: inattention, unable to assess debo sensation secondary to aphasia  LUE: intact to touch  Tone: normal in BUE at Rest        Balance:  Sitting: Impaired  Sitting - Static: Fair (occasional)  Sitting - Dynamic: Poor (constant support)  Standing: Impaired  Standing - Static: Poor     Functional Mobility and Transfers for ADLs:  Bed Mobility:  Supine to Sit: Maximum assistance;Assist x2  Sit to Supine: Maximum assistance;Assist x2  Scooting: Maximum assistance;Assist x2     Transfers:  Sit to Stand: Maximum assistance;Assist x2  Stand to Sit: Maximum assistance;Assist x2     ADL Assessment:  Feeding: Maximum assistance  Oral Facial Hygiene/Grooming: Total assistance  Bathing: Maximum assistance  Upper Body Dressing: Maximum assistance  Lower Body Dressing: Total assistance  Toileting: Total assistance (incontinent)                 ADL Intervention and task modifications:  Feeding  Feeding Assistance: Maximum assistance  Container Management: Total assistance (dependent)  Cutting Food: Total assistance (dependent)  Utensil Management: Maximum assistance  Food to Mouth: Moderate assistance  Drink to Mouth: Minimum assistance  Cues: Verbal cues provided;Visual cues provided; Tactile cues provided     Training for bed mobility and feeding in order to increase independence necessary in ADL tasks and decrease caregiver burden. Patient needing verbal, tactile and visual cues to maximize participation. Poor initiation with self care tasks. Once initiation facilitated she brought drink to mouth multiple times with RUE. Impaired utensil use, needing assist with initiation, bring spoon to mouth 3/5 times with additional time. Functional Measure:  Barthel Index:      Bathin  Bladder: 0  Bowels: 0  Groomin  Dressin  Feedin  Mobility: 0  Stairs: 0  Toilet Use: 0  Transfer (Bed to Chair and Back): 5  Total: 10         Barthel and G-code impairment scale:  Percentage of impairment CH  0% CI  1-19% CJ  20-39% CK  40-59% CL  60-79% CM  80-99% CN  100%   Barthel Score 0-100 100 99-80 79-60 59-40 20-39 1-19    0   Barthel Score 0-20 20 17-19 13-16 9-12 5-8 1-4 0      The Barthel ADL Index: Guidelines  1. The index should be used as a record of what a patient does, not as a record of what a patient could do.   2. The main aim is to establish degree of independence from any help, physical or verbal, however minor and for whatever reason. 3. The need for supervision renders the patient not independent. 4. A patient's performance should be established using the best available evidence. Asking the patient, friends/relatives and nurses are the usual sources, but direct observation and common sense are also important. However direct testing is not needed. 5. Usually the patient's performance over the preceding 24-48 hours is important, but occasionally longer periods will be relevant. 6. Middle categories imply that the patient supplies over 50 per cent of the effort. 7. Use of aids to be independent is allowed. Lena Hathaway., Barthel, D.W. (2237). Functional evaluation: the Barthel Index. [facility] (14)2. Lewis Finley tamera TABITHA BellaF, Damon Colón., Freddie Cantu., [place], [address] [place] [address] (1999). Measuring the change indisability after inpatient rehabilitation; comparison of the responsiveness of the Barthel Index and Functional [place] Measure. Journal of Neurology, Neurosurgery, and Psychiatry, 66(4), 434-718. Magaly Arriaza, [place].JEMMA, SWEETIE Epps, & Lindsay Baez MNeidaA. (2004.) Assessment of post-stroke quality of life in cost-effectiveness studies: The usefulness of the Barthel Index and the EuroQoL-5D. Quality of Life Research, 13, 704-48            G codes: In compliance with CMSs Claims Based Outcome Reporting, the following G-code set was chosen for this patient based on their primary functional limitation being treated: The outcome measure chosen to determine the severity of the functional limitation was the Barthel Index with a score of 10/100 which was correlated with the impairment scale.       · Self Care:               - CURRENT STATUS:    CM - 80%-99% impaired, limited or restricted               - GOAL STATUS:           CL - 60%-79% impaired, limited or restricted               - D/C STATUS:                       ---------------To be determined---------------      Occupational Therapy Evaluation Charge Determination   History Examination Decision-Making   LOW Complexity : Brief history review  HIGH Complexity : 5 or more performance deficits relating to physical, cognitive , or psychosocial skils that result in activity limitations and / or participation restrictions HIGH Complexity : Patient presents with comorbidities that affect occupational performance. Signifigant modification of tasks or assistance (eg, physical or verbal) with assessment (s) is necessary to enable patient to complete evaluation       Based on the above components, the patient evaluation is determined to be of the following complexity level: LOW   Pain:  Pain Scale 1: Adult Nonverbal Pain Scale     Activity Tolerance:   Fair for sitting EOB and completion of self feeding. After treatment:   [ ] Patient left in no apparent distress sitting up in chair  [X] Patient left in no apparent distress in bed  [X] Call bell left within reach  [X] Nursing notified  [ ] Caregiver present  [X] Bed alarm activated      COMMUNICATION/EDUCATION:   The patients plan of care was discussed with: Physical Therapist, Registered Nurse,  and Patient. [ ] Home safety education was provided and the patient/caregiver indicated understanding. [ ] Patient/family have participated as able in goal setting and plan of care. [X] Patient/family agree to work toward stated goals and plan of care. [ ] Patient understands intent and goals of therapy, but is neutral about his/her participation. [ ] Patient is unable to participate in goal setting and plan of care. This patients plan of care is appropriate for delegation to [facility].      Thank you for this referral.  Stefany Workman OT  Time Calculation: 27 mins

## 2017-01-17 NOTE — PROGRESS NOTES
Daily Progress Note: 1/17/2017  aIm Sheppard MD    Assessment/Plan:   Deep venous thrombosis - New acute and extensive. --pharmacy dosing coumadin       ARF (acute renal failure) / Dehydration - POA, likely due to poor PO intake. Hydrate. Hold PO K and hold Bumex  --Cr back to baseline, K normal     Hypertrophic cardiomyopathy / Chronic diastolic heart failure -no exacerbation  -Hold bumex, continue metoprolol, home norpace     Dementia / Cerebral atrophy - Severe. Continue seroquel and memantine.     Paroxysmal atrial fibrillation / CAD (coronary artery disease) - No acute symptoms. Continue BB. Not on ASA. Hold statin.     Glaucoma - Continue lumigan, alphagan and cospot     Hyperlipidemia- Given age and co morbidities, I would not test or treat hyperlipidemia     Weakness due to cerebrovascular accident - Fall precautions. Pureed and nector diet. She is at baseline, making no improvement since stroke.  Family wants out of SNF, and into LTC      No UTI - Colonization with VRE   - repeat as cath specimen           Problem List:  Problem List as of 1/17/2017  Date Reviewed: 1/9/2017          Codes Class Noted - Resolved    Deep venous thrombosis ([facility] ICD-10-CM: I82.409  ICD-9-CM: 453.40  1/9/2017 - Present        CAD (coronary artery disease) ICD-10-CM: I25.10  ICD-9-CM: 414.00  Unknown - Present        Dehydration ICD-10-CM: E86.0  ICD-9-CM: 276.51  1/9/2017 - Present        ARF (acute renal failure) ([facility] ICD-10-CM: N17.9  ICD-9-CM: 584.9  1/9/2017 - Present        Weakness due to cerebrovascular accident (CVA) ([facility] ICD-10-CM: I63.9, R53.1  ICD-9-CM: 434.91, 780.79  10/26/2016 - Present        Goals of care, counseling/discussion ICD-10-CM: Z71.89  ICD-9-CM: V65.49  10/26/2016 - Present        Hyperlipidemia LDL goal <70 ICD-10-CM: E78.5  ICD-9-CM: 272.4  10/25/2016 - Present        Cerebral atrophy ICD-10-CM: G31.9  ICD-9-CM: 331.9  10/25/2016 - Present        Dementia ICD-10-CM: F03.90  ICD-9-CM: 294.20  10/25/2016 - Present        Glaucoma ICD-10-CM: H40.9  ICD-9-CM: 365.9  1/13/2014 - Present        Hypertrophic cardiomyopathy ([facility] ICD-10-CM: I42.2  ICD-9-CM: 425.18  6/1/2012 - Present        Paroxysmal atrial fibrillation ([facility] ICD-10-CM: I48.0  ICD-9-CM: 427.31  11/29/2011 - Present        Chronic diastolic heart failure (HCC) ICD-10-CM: I50.32  ICD-9-CM: 428.32  2/26/2011 - Present        RESOLVED: Carotid occlusion, left ICD-10-CM: I65.22  ICD-9-CM: 433.10  10/25/2016 - 1/9/2017        RESOLVED: Acute CVA (cerebrovascular accident) ([facility] ICD-10-CM: I63.9  ICD-9-CM: 434.91  10/24/2016 - 1/9/2017        RESOLVED: Orthostatic hypotension ICD-10-CM: I95.1  ICD-9-CM: 458.0  10/28/2015 - 1/9/2017        RESOLVED: Acute embolic stroke ([facility] [id number]: I63.9  ICD-9-CM: 434.11  6/26/2015 - 1/9/2017        RESOLVED: Dyslipidemia ICD-10-CM: E78.5  ICD-9-CM: 272.4  6/25/2015 - 1/9/2017        RESOLVED: Pulmonary nodule ICD-10-CM: R91.1  ICD-9-CM: 793.11  6/25/2015 - 1/9/2017        RESOLVED: Receptive aphasia ICD-10-CM: R47.01  ICD-9-CM: 784.3  6/25/2015 - 8/23/2015        RESOLVED: Dysarthria ICD-10-CM: R47.1  ICD-9-CM: 784.51  6/25/2015 - 8/23/2015        RESOLVED: Expressive aphasia ICD-10-CM: R47.01  ICD-9-CM: 784.3  6/25/2015 - 8/23/2015        RESOLVED: ADHF (acute decompensated heart failure) ([facility] ICD-10-CM: I50.9  ICD-9-CM: 428.0  6/24/2015 - 8/23/2015        RESOLVED: Cerebral ventriculomegaly due to brain atrophy ICD-10-CM: G31.9  ICD-9-CM: 331.9  6/24/2015 - 8/23/2015        RESOLVED: Left-sided visual neglect ICD-10-CM: R41.4  ICD-9-CM: 781.8  6/24/2015 - 8/23/2015        RESOLVED: Chest pain ICD-10-CM: R07.9  ICD-9-CM: 786.50  6/23/2015 - 8/23/2015        RESOLVED: RYAN (dyspnea on exertion) ICD-10-CM: R06.09  ICD-9-CM: 786.09  5/22/2015 - 8/23/2015        RESOLVED: Chest tightness ICD-10-CM: R07.89  ICD-9-CM: 786.59  5/22/2015 - 8/23/2015        RESOLVED: Anemia ICD-10-CM: D64.9  ICD-9-CM: 285.9  1/29/2014 - 1/9/2017        RESOLVED: Supratherapeutic INR ICD-10-CM: R79.1  ICD-9-CM: 790.92  1/13/2014 - 1/29/2014        RESOLVED: Melena ICD-10-CM: K92.1  ICD-9-CM: 578.1  1/13/2014 - 1/29/2014        RESOLVED: Macrocytic anemia ICD-10-CM: D53.9  ICD-9-CM: 281.9  1/13/2014 - 1/29/2014        RESOLVED: GI bleed ICD-10-CM: K92.2  ICD-9-CM: 578.9  1/13/2014 - 1/29/2014        RESOLVED: Cough ICD-10-CM: R05  ICD-9-CM: 786.2  1/13/2014 - 1/29/2014        RESOLVED: Diarrhea ICD-10-CM: R19.7  ICD-9-CM: 787.91  1/13/2014 - 1/29/2014        RESOLVED: Coffee ground emesis ICD-10-CM: K92.0  ICD-9-CM: 578.0  1/13/2014 - 1/29/2014        RESOLVED: Left flank pain ICD-10-CM: R10.9  ICD-9-CM: 789.09  1/13/2014 - 1/29/2014        RESOLVED: Weakness generalized ICD-10-CM: R53.1  ICD-9-CM: 780.79  1/13/2014 - 8/23/2015        RESOLVED: Hypoxia ICD-10-CM: R09.02  ICD-9-CM: 799.02  1/13/2014 - 1/29/2014        RESOLVED: CAP (community acquired pneumonia) ICD-10-CM: J18.9  ICD-9-CM: 390  1/13/2014 - 1/29/2014        RESOLVED: Pericardial effusion ICD-10-CM: I31.3  ICD-9-CM: 423.9  1/13/2014 - 1/29/2014        RESOLVED: Elevated blood pressure reading without diagnosis of hypertension ICD-10-CM: R03.0  ICD-9-CM: 796.2  1/13/2014 - 1/29/2014        RESOLVED: Mitral regurgitation ICD-10-CM: I34.0  ICD-9-CM: 424.0  10/22/2010 - 1/9/2017              Subjective:    68 y.o.  female who presented to the Emergency Department complaining of leg swelling. She provides no hx. Family is present. I have reviewed records. A few months ago she had a CVA, and has been persistently more immobile since. She has severe dementia and other medical issues. Family noted LLE swelling yesterday, and she had mild LLE discomfort today. She is supposed to be on coumadin for Afib, but her INR is only 1.3. ER workup shows extensive LLE DVT and ARF. 1/10:  Dopplers revealed extensive DVT LLE.   Pt poorly responsive at baseline since CVA - now seems worse. Lovenox until coumadin therapeutic.     :  Little change from yesterday and remains poorly responsive. She is stable for [facility].     : More alert today. Nursing reports she was more alert yesterday and took diet well. She is stable for [facility].     : Sleeping but arousable. Case management working on placement. She is stable for d/c.    : Patient is awake and alert this morning. She looks comfortable. Able to say yes/no a few times but unreliable answers. Believes she is at home. Does not seem to be in pain. No acute events overnight. 1/15: No acute events overnight. Patient answers simple yes/no questions but unreliable. :  Little change. Alert but not oriented at all. Seems to be in no distress. Stable for [facility].      :  Little change. She said \"OK\" this AM when asked how she was doing but no other speech to questions. Stable for [facility]. Review of Systems:   Review of systems not obtained due to patient factors. Objective:   Physical Exam:     Visit Vitals    /68 (BP 1 Location: Left arm, BP Patient Position: At rest)    Pulse 76    Temp 97.9 °F (36.6 °C)    Resp 16    Ht 5' 4\" (1.626 m)    Wt 61.7 kg (136 lb)    SpO2 98%    Breastfeeding No    BMI 23.34 kg/m2      O2 Device: Room air    Temp (24hrs), Av.9 °F (36.6 °C), Min:97.7 °F (36.5 °C), Max:98.1 °F (36.7 °C)        01/15 07 -  1900  In: 480 [P.O.:480]  Out: 750 [Urine:750]    General:   awake and alert   Head:  Normocephalic, without obvious abnormality, atraumatic. Eyes:  Conjunctivae/corneas clear. EOMs intact. Nose: Nares normal. Septum midline. Mucosa normal. No drainage or sinus tenderness. Throat: Lips, mucosa, and tongue moist..   Neck: Supple, symmetrical, trachea midline   Lungs:   Ctab, no w/r/r   Heart:  Regular rate and rhythm, S1, S2 normal, 2/6 murmur loudest axilla, apex and LSB. No click, rub or gallop.    Abdomen:   Soft, apparently non-tender. Bowel sounds normal. No masses,  No organomegaly. Extremities: no cyanosis or RLE edema; LLE with trace to 1+ edema. Skin: Skin color, texture, turgor normal. No rashes or lesions   Neurologic:  Alert and oriented X 0        Data Review:     Jong LE Dopplers 1/9/17:  CONCLUSION: Left lower extremity venous duplex positive for acute deep  venous thrombosis and thrombophlebitis throughout the left leg from  external iliac vein down through the calf. Right common femoral vein  is thrombus free.     Recent Days:  Recent Labs      01/16/17   0322  01/15/17   0407   WBC  9.6  8.1   HGB  10.6*  10.4*   HCT  32.3*  32.1*   PLT  312  277     Recent Labs      01/17/17   0251  01/16/17   0318  01/15/17   0407   NA   --   136  137   K   --   4.3  4.2   CL   --   103  106   CO2   --   28  27   GLU   --   109*  94   BUN   --   16  16   CREA   --   0.83  0.82   CA   --   8.0*  8.0*   INR  3.1*  3.1*  2.4*     No results for input(s): PH, PCO2, PO2, HCO3, FIO2 in the last 72 hours.     24 Hour Results:  Recent Results (from the past 24 hour(s))   GLUCOSE, POC    Collection Time: 01/16/17  7:30 AM   Result Value Ref Range    Glucose (POC) 104 (H) 65 - 100 mg/dL    Performed by Rubén IndiaMARTteresaMicello    GLUCOSE, POC    Collection Time: 01/16/17 11:38 AM   Result Value Ref Range    Glucose (POC) 129 (H) 65 - 100 mg/dL    Performed by [place]    GLUCOSE, POC    Collection Time: 01/16/17  4:10 PM   Result Value Ref Range    Glucose (POC) 151 (H) 65 - 100 mg/dL    Performed by [place]    GLUCOSE, POC    Collection Time: 01/16/17  9:03 PM   Result Value Ref Range    Glucose (POC) 168 (H) 65 - 100 mg/dL    Performed by [address] [place] [address] Drive + INR    Collection Time: 01/17/17  2:51 AM   Result Value Ref Range    INR 3.1 (H) 0.9 - 1.1      Prothrombin time 32.7 (H) 9.0 - 11.1 sec       Medications reviewed  Current Facility-Administered Medications   Medication Dose Route Frequency    bisacodyl (DULCOLAX) suppository 10 mg  10 mg Rectal DAILY PRN    albuterol (ACCUNEB) nebulizer solution 1.25 mg  1.25 mg Nebulization Q4H PRN    bimatoprost (LUMIGAN) 0.01 % ophthalmic drops 1 Drop  1 Drop Right Eye QHS    brimonidine (ALPHAGAN P) 0.1 % ophthalmic solution 1 Drop  1 Drop Right Eye BID    dorzolamide-timolol (COSOPT) 22.3-6.8 mg/mL ophthalmic solution 1 Drop  1 Drop Right Eye BID    memantine ER (NAMENDA XR) capsule 28 mg  28 mg Oral DAILY    QUEtiapine (SEROquel) tablet 12.5 mg  12.5 mg Oral QHS    naloxone (NARCAN) injection 0.4 mg  0.4 mg IntraVENous PRN    0.9% sodium chloride infusion  50 mL/hr IntraVENous CONTINUOUS    acetaminophen (TYLENOL) tablet 650 mg  650 mg Oral Q4H PRN    HYDROcodone-acetaminophen (NORCO) 5-325 mg per tablet 1 Tab  1 Tab Oral Q4H PRN    diphenhydrAMINE (BENADRYL) capsule 25 mg  25 mg Oral Q4H PRN    ondansetron (ZOFRAN) injection 4 mg  4 mg IntraVENous Q4H PRN    metoprolol tartrate (LOPRESSOR) tablet 75 mg  75 mg Oral BID    disopyramide phosphate (NORPACE) capsule 100 mg  100 mg Oral BID    Warfarin- Pharmacy to dose daily   Other Rx Dosing/Monitoring           Julien Rasmussen MD

## 2017-01-18 LAB
BACTERIA SPEC CULT: NORMAL
CC UR VC: NORMAL
GLUCOSE BLD STRIP.AUTO-MCNC: 100 MG/DL (ref 65–100)
GLUCOSE BLD STRIP.AUTO-MCNC: 118 MG/DL (ref 65–100)
GLUCOSE BLD STRIP.AUTO-MCNC: 132 MG/DL (ref 65–100)
GLUCOSE BLD STRIP.AUTO-MCNC: 88 MG/DL (ref 65–100)
INR PPP: 2.2 (ref 0.9–1.1)
PROTHROMBIN TIME: 22.2 SEC (ref 9–11.1)
SERVICE CMNT-IMP: ABNORMAL
SERVICE CMNT-IMP: ABNORMAL
SERVICE CMNT-IMP: NORMAL

## 2017-01-18 PROCEDURE — 85610 PROTHROMBIN TIME: CPT | Performed by: INTERNAL MEDICINE

## 2017-01-18 PROCEDURE — 82962 GLUCOSE BLOOD TEST: CPT

## 2017-01-18 PROCEDURE — 74011250637 HC RX REV CODE- 250/637: Performed by: INTERNAL MEDICINE

## 2017-01-18 PROCEDURE — 36415 COLL VENOUS BLD VENIPUNCTURE: CPT | Performed by: INTERNAL MEDICINE

## 2017-01-18 PROCEDURE — 65660000000 HC RM CCU STEPDOWN

## 2017-01-18 RX ORDER — WARFARIN SODIUM 5 MG/1
5 TABLET ORAL ONCE
Status: COMPLETED | OUTPATIENT
Start: 2017-01-18 | End: 2017-01-18

## 2017-01-18 RX ADMIN — QUETIAPINE FUMARATE 12.5 MG: 25 TABLET, FILM COATED ORAL at 23:15

## 2017-01-18 RX ADMIN — MEMANTINE HYDROCHLORIDE 28 MG: 28 CAPSULE, EXTENDED RELEASE ORAL at 10:42

## 2017-01-18 RX ADMIN — DORZOLAMIDE HYDROCHLORIDE AND TIMOLOL MALEATE 1 DROP: 20; 5 SOLUTION/ DROPS OPHTHALMIC at 23:17

## 2017-01-18 RX ADMIN — METOPROLOL TARTRATE 75 MG: 25 TABLET ORAL at 10:42

## 2017-01-18 RX ADMIN — DISOPYRAMIDE PHOSPHATE 100 MG: 100 CAPSULE ORAL at 23:15

## 2017-01-18 RX ADMIN — METOPROLOL TARTRATE 75 MG: 25 TABLET ORAL at 23:15

## 2017-01-18 RX ADMIN — WARFARIN SODIUM 5 MG: 5 TABLET ORAL at 19:14

## 2017-01-18 RX ADMIN — BRIMONIDINE TARTRATE 1 DROP: 1 SOLUTION/ DROPS OPHTHALMIC at 10:43

## 2017-01-18 RX ADMIN — BRIMONIDINE TARTRATE 1 DROP: 1 SOLUTION/ DROPS OPHTHALMIC at 23:17

## 2017-01-18 RX ADMIN — DORZOLAMIDE HYDROCHLORIDE AND TIMOLOL MALEATE 1 DROP: 20; 5 SOLUTION/ DROPS OPHTHALMIC at 10:44

## 2017-01-18 RX ADMIN — DISOPYRAMIDE PHOSPHATE 100 MG: 100 CAPSULE ORAL at 10:43

## 2017-01-18 RX ADMIN — BIMATOPROST 1 DROP: 0.1 SOLUTION/ DROPS OPHTHALMIC at 23:17

## 2017-01-18 NOTE — PROGRESS NOTES
Daily Progress Note: 1/18/2017  Cintia Limon MD    Assessment/Plan:   Deep venous thrombosis - New acute and extensive. --pharmacy dosing coumadin       ARF (acute renal failure) / Dehydration - POA, likely due to poor PO intake. Hydrate. Hold PO K and hold Bumex  --Cr back to baseline, K normal     Hypertrophic cardiomyopathy / Chronic diastolic heart failure -no exacerbation  -Hold bumex, continue metoprolol, home norpace     Dementia / Cerebral atrophy - Severe. Continue seroquel and memantine.     Paroxysmal atrial fibrillation / CAD (coronary artery disease) - No acute symptoms. Continue BB. Not on ASA. Hold statin.     Glaucoma - Continue lumigan, alphagan and cospot     Hyperlipidemia- Given age and co morbidities, I would not test or treat hyperlipidemia     Weakness due to cerebrovascular accident - Fall precautions. Pureed and nector diet.  She is at baseline, making no improvement since stroke.       No UTI - Colonization with VRE   - repeat urine culture with mult bacteria - likely colonization given no symptoms, no fever           Problem List:  Problem List as of 1/18/2017  Date Reviewed: 1/9/2017          Codes Class Noted - Resolved    Deep venous thrombosis ([facility] ICD-10-CM: I82.409  ICD-9-CM: 453.40  1/9/2017 - Present        CAD (coronary artery disease) ICD-10-CM: I25.10  ICD-9-CM: 414.00  Unknown - Present        Dehydration ICD-10-CM: E86.0  ICD-9-CM: 276.51  1/9/2017 - Present        ARF (acute renal failure) ([facility] ICD-10-CM: N17.9  ICD-9-CM: 584.9  1/9/2017 - Present        Weakness due to cerebrovascular accident (CVA) ([facility] ICD-10-CM: I63.9, R53.1  ICD-9-CM: 434.91, 780.79  10/26/2016 - Present        Goals of care, counseling/discussion ICD-10-CM: Z71.89  ICD-9-CM: V65.49  10/26/2016 - Present        Hyperlipidemia LDL goal <70 ICD-10-CM: E78.5  ICD-9-CM: 272.4  10/25/2016 - Present        Cerebral atrophy ICD-10-CM: G31.9  ICD-9-CM: 331.9  10/25/2016 - Present Dementia ICD-10-CM: F03.90  ICD-9-CM: 294.20  10/25/2016 - Present        Glaucoma ICD-10-CM: H40.9  ICD-9-CM: 365.9  1/13/2014 - Present        Hypertrophic cardiomyopathy ([facility] ICD-10-CM: I42.2  ICD-9-CM: 425.18  6/1/2012 - Present        Paroxysmal atrial fibrillation ([facility] ICD-10-CM: I48.0  ICD-9-CM: 427.31  11/29/2011 - Present        Chronic diastolic heart failure (HCC) ICD-10-CM: I50.32  ICD-9-CM: 428.32  2/26/2011 - Present        RESOLVED: Carotid occlusion, left ICD-10-CM: I65.22  ICD-9-CM: 433.10  10/25/2016 - 1/9/2017        RESOLVED: Acute CVA (cerebrovascular accident) ([facility] ICD-10-CM: I63.9  ICD-9-CM: 434.91  10/24/2016 - 1/9/2017        RESOLVED: Orthostatic hypotension ICD-10-CM: I95.1  ICD-9-CM: 458.0  10/28/2015 - 1/9/2017        RESOLVED: Acute embolic stroke ([facility] VDW-73-MM: I63.9  ICD-9-CM: 434.11  6/26/2015 - 1/9/2017        RESOLVED: Dyslipidemia ICD-10-CM: E78.5  ICD-9-CM: 272.4  6/25/2015 - 1/9/2017        RESOLVED: Pulmonary nodule ICD-10-CM: R91.1  ICD-9-CM: 793.11  6/25/2015 - 1/9/2017        RESOLVED: Receptive aphasia ICD-10-CM: R47.01  ICD-9-CM: 784.3  6/25/2015 - 8/23/2015        RESOLVED: Dysarthria ICD-10-CM: R47.1  ICD-9-CM: 784.51  6/25/2015 - 8/23/2015        RESOLVED: Expressive aphasia ICD-10-CM: R47.01  ICD-9-CM: 784.3  6/25/2015 - 8/23/2015        RESOLVED: ADHF (acute decompensated heart failure) ([facility] ICD-10-CM: I50.9  ICD-9-CM: 428.0  6/24/2015 - 8/23/2015        RESOLVED: Cerebral ventriculomegaly due to brain atrophy ICD-10-CM: G31.9  ICD-9-CM: 331.9  6/24/2015 - 8/23/2015        RESOLVED: Left-sided visual neglect ICD-10-CM: R41.4  ICD-9-CM: 781.8  6/24/2015 - 8/23/2015        RESOLVED: Chest pain ICD-10-CM: R07.9  ICD-9-CM: 786.50  6/23/2015 - 8/23/2015        RESOLVED: RYAN (dyspnea on exertion) ICD-10-CM: R06.09  ICD-9-CM: 786.09  5/22/2015 - 8/23/2015        RESOLVED: Chest tightness ICD-10-CM: R07.89  ICD-9-CM: 786.59  5/22/2015 - 8/23/2015        RESOLVED: Anemia ICD-10-CM: D64.9  ICD-9-CM: 285.9  1/29/2014 - 1/9/2017        RESOLVED: Supratherapeutic INR ICD-10-CM: R79.1  ICD-9-CM: 790.92  1/13/2014 - 1/29/2014        RESOLVED: Melena ICD-10-CM: K92.1  ICD-9-CM: 578.1  1/13/2014 - 1/29/2014        RESOLVED: Macrocytic anemia ICD-10-CM: D53.9  ICD-9-CM: 281.9  1/13/2014 - 1/29/2014        RESOLVED: GI bleed ICD-10-CM: K92.2  ICD-9-CM: 578.9  1/13/2014 - 1/29/2014        RESOLVED: Cough ICD-10-CM: R05  ICD-9-CM: 786.2  1/13/2014 - 1/29/2014        RESOLVED: Diarrhea ICD-10-CM: R19.7  ICD-9-CM: 787.91  1/13/2014 - 1/29/2014        RESOLVED: Coffee ground emesis ICD-10-CM: K92.0  ICD-9-CM: 578.0  1/13/2014 - 1/29/2014        RESOLVED: Left flank pain ICD-10-CM: R10.9  ICD-9-CM: 789.09  1/13/2014 - 1/29/2014        RESOLVED: Weakness generalized ICD-10-CM: R53.1  ICD-9-CM: 780.79  1/13/2014 - 8/23/2015        RESOLVED: Hypoxia ICD-10-CM: R09.02  ICD-9-CM: 799.02  1/13/2014 - 1/29/2014        RESOLVED: CAP (community acquired pneumonia) ICD-10-CM: J18.9  ICD-9-CM: 800  1/13/2014 - 1/29/2014        RESOLVED: Pericardial effusion ICD-10-CM: I31.3  ICD-9-CM: 423.9  1/13/2014 - 1/29/2014        RESOLVED: Elevated blood pressure reading without diagnosis of hypertension ICD-10-CM: R03.0  ICD-9-CM: 796.2  1/13/2014 - 1/29/2014        RESOLVED: Mitral regurgitation ICD-10-CM: I34.0  ICD-9-CM: 424.0  10/22/2010 - 1/9/2017              Subjective:    68 y.o.  female who presented to the Emergency Department complaining of leg swelling. She provides no hx. Family is present. I have reviewed records. A few months ago she had a CVA, and has been persistently more immobile since. She has severe dementia and other medical issues. Family noted LLE swelling yesterday, and she had mild LLE discomfort today. She is supposed to be on coumadin for Afib, but her INR is only 1.3. ER workup shows extensive LLE DVT and ARF. 1/10:  Dopplers revealed extensive DVT LLE.   Pt poorly responsive at baseline since CVA - now seems worse. Lovenox until coumadin therapeutic.     :  Little change from yesterday and remains poorly responsive. She is stable for [facility].     : More alert today. Nursing reports she was more alert yesterday and took diet well. She is stable for [facility].     : Sleeping but arousable. Case management working on placement. She is stable for d/c.    : Patient is awake and alert this morning. She looks comfortable. Able to say yes/no a few times but unreliable answers. Believes she is at home. Does not seem to be in pain. No acute events overnight. 1/15: No acute events overnight. Patient answers simple yes/no questions but unreliable. :  Little change. Alert but not oriented at all. Seems to be in no distress. Stable for [facility].      :  Little change. She said \"OK\" this AM when asked how she was doing but no other speech to questions. Stable for [facility].    :  Little change. Less verbal this AM.  Moves extrem spontaneously but not to command. Stable for [facility] or [facility]. Review of Systems:   Review of systems not obtained due to patient factors. Objective:   Physical Exam:     Visit Vitals    BP 99/51 (BP 1 Location: Left arm, BP Patient Position: At rest)    Pulse 85    Temp 98.1 °F (36.7 °C)    Resp 16    Ht 5' 4\" (1.626 m)    Wt 61.7 kg (136 lb)    SpO2 96%    Breastfeeding No    BMI 23.34 kg/m2      O2 Device: Room air    Temp (24hrs), Av °F (36.7 °C), Min:97.6 °F (36.4 °C), Max:98.4 °F (36.9 °C)         07 -  1900  In: 480 [P.O.:480]  Out: 750 [Urine:750]    General:   awake and alert   Head:  Normocephalic, without obvious abnormality, atraumatic. Eyes:  Conjunctivae/corneas clear. EOMs intact. Nose: Nares normal. Septum midline. Mucosa normal. No drainage or sinus tenderness.    Throat: Lips, mucosa, and tongue moist..   Neck: Supple, symmetrical, trachea midline   Lungs:   Ctab, no w/r/r   Heart:  Regular rate and rhythm, S1, S2 normal, 2/6 murmur loudest axilla, apex and LSB. No click, rub or gallop. Abdomen:   Soft, apparently non-tender. Bowel sounds normal. No masses,  No organomegaly. Extremities: no cyanosis or RLE edema; LLE with trace to 1+ edema. Skin: Skin color, texture, turgor normal. No rashes or lesions   Neurologic:  Alert and oriented X 0. Moves limbs spontaneously. Does not follow commands. Will eat when fed. Data Review:     Jong LE Dopplers 1/9/17:  CONCLUSION: Left lower extremity venous duplex positive for acute deep  venous thrombosis and thrombophlebitis throughout the left leg from  external iliac vein down through the calf. Right common femoral vein  is thrombus free.     Recent Days:  Recent Labs      01/16/17   0322   WBC  9.6   HGB  10.6*   HCT  32.3*   PLT  312     Recent Labs      01/17/17   0251  01/16/17   0318   NA   --   136   K   --   4.3   CL   --   103   CO2   --   28   GLU   --   109*   BUN   --   16   CREA   --   0.83   CA   --   8.0*   INR  3.1*  3.1*     No results for input(s): PH, PCO2, PO2, HCO3, FIO2 in the last 72 hours.     24 Hour Results:  Recent Results (from the past 24 hour(s))   GLUCOSE, POC    Collection Time: 01/17/17  7:21 AM   Result Value Ref Range    Glucose (POC) 108 (H) 65 - 100 mg/dL    Performed by [employer]    GLUCOSE, POC    Collection Time: 01/17/17 11:05 AM   Result Value Ref Range    Glucose (POC) 129 (H) 65 - 100 mg/dL    Performed by [employer]    GLUCOSE, POC    Collection Time: 01/17/17  4:09 PM   Result Value Ref Range    Glucose (POC) 126 (H) 65 - 100 mg/dL    Performed by Usha Chapman (PCT)    GLUCOSE, POC    Collection Time: 01/17/17  9:32 PM   Result Value Ref Range    Glucose (POC) 133 (H) 65 - 100 mg/dL    Performed by Monica Davidson (PCT)        Medications reviewed  Current Facility-Administered Medications   Medication Dose Route Frequency    bisacodyl (DULCOLAX) suppository 10 mg  10 mg Rectal DAILY PRN    albuterol (ACCUNEB) nebulizer solution 1.25 mg  1.25 mg Nebulization Q4H PRN    bimatoprost (LUMIGAN) 0.01 % ophthalmic drops 1 Drop  1 Drop Right Eye QHS    brimonidine (ALPHAGAN P) 0.1 % ophthalmic solution 1 Drop  1 Drop Right Eye BID    dorzolamide-timolol (COSOPT) 22.3-6.8 mg/mL ophthalmic solution 1 Drop  1 Drop Right Eye BID    memantine ER (NAMENDA XR) capsule 28 mg  28 mg Oral DAILY    QUEtiapine (SEROquel) tablet 12.5 mg  12.5 mg Oral QHS    naloxone (NARCAN) injection 0.4 mg  0.4 mg IntraVENous PRN    0.9% sodium chloride infusion  50 mL/hr IntraVENous CONTINUOUS    acetaminophen (TYLENOL) tablet 650 mg  650 mg Oral Q4H PRN    HYDROcodone-acetaminophen (NORCO) 5-325 mg per tablet 1 Tab  1 Tab Oral Q4H PRN    diphenhydrAMINE (BENADRYL) capsule 25 mg  25 mg Oral Q4H PRN    ondansetron (ZOFRAN) injection 4 mg  4 mg IntraVENous Q4H PRN    metoprolol tartrate (LOPRESSOR) tablet 75 mg  75 mg Oral BID    disopyramide phosphate (NORPACE) capsule 100 mg  100 mg Oral BID    Warfarin- Pharmacy to dose daily   Other Rx Dosing/Monitoring       Mario Solis MD

## 2017-01-18 NOTE — PROGRESS NOTES
8866:  I spoke with pt's . He stated he would like pt to return to [facility]. I sent a referral in [address]. Awaiting reply. ANUJA Haider    0662:  Call received from [place] who would like to either get nursing care in at home for 8-9 hrs/day or their next choice is for [facility]. A referral was sent in [address]. Awaiting reply. ANUJA Haider    CM Note:  I called Federico Ramirez, pt's granddaughter, to let her know pt is being considered by [facility]. She stated it was \"too far\" for her grandfather to drive. When I suggested sending a referral to [facility], she gave the same answer. She stated she didn't know what they were going to do. Will continue to follow.   ANUJA Haider

## 2017-01-18 NOTE — PROGRESS NOTES
[facility] Dosing Services: 1/18/2017     Consult for Warfarin Dosing by Pharmacy by Dr. Rajinder Tai provided for this 68 y.o. female for indication of Venous Thrombosis. Day of Therapy: continued from home 5mg Monday, Tuesday, Wednesday ,Thursday, and Friday. 6mg on Saturday and Sunday. INR 1.3 on admission. Dose to achieve an INR goal of 2-3     INR 2.2 (Therapeutic)    Order entered for Warfarin 5 mg to be given today at 18:00. Significant drug interactions: none  LABS    PT/INR Lab Results   Component Value Date/Time    INR 2.2 01/18/2017 06:21 AM    INR (POC) 1.0 10/24/2016 06:33 PM    INR POC 2.2 05/28/2015 01:01 PM      Platelets Lab Results   Component Value Date/Time    PLATELET 298 61/69/4653 03:22 AM      H/H     Lab Results   Component Value Date/Time    HGB 10.6 01/16/2017 03:22 AM        Warfarin Administrations (last 168 hours)       Date/Time Action Medication Dose      01/17/17 1714 Given    warfarin (COUMADIN) tablet 4 mg 4 mg    01/16/17 1800 Given   [held per order]    Warfarin - HOLD dose for 1/16/17     01/15/17 1709 Given    warfarin (COUMADIN) tablet 5 mg 5 mg    01/14/17 1807 Given    warfarin (COUMADIN) tablet 5 mg 5 mg    01/13/17 1737 Given    warfarin (COUMADIN) tablet 5 mg 5 mg    01/12/17 1850 Given    warfarin (COUMADIN) tablet 4 mg 4 mg    01/11/17 1737 Given    warfarin (COUMADIN) tablet 4 mg 4 mg          Pharmacy to follow daily and will provide subsequent Warfarin dosing based on clinical status. Thank you for the consult,  Trey Andersen [facility]

## 2017-01-19 LAB
ALBUMIN SERPL BCP-MCNC: 2.2 G/DL (ref 3.5–5)
ALBUMIN/GLOB SERPL: 0.5 {RATIO} (ref 1.1–2.2)
ALP SERPL-CCNC: 94 U/L (ref 45–117)
ALT SERPL-CCNC: 29 U/L (ref 12–78)
ANION GAP BLD CALC-SCNC: 5 MMOL/L (ref 5–15)
AST SERPL W P-5'-P-CCNC: 25 U/L (ref 15–37)
BASOPHILS # BLD AUTO: 0 K/UL (ref 0–0.1)
BASOPHILS # BLD: 1 % (ref 0–1)
BILIRUB SERPL-MCNC: 0.4 MG/DL (ref 0.2–1)
BUN SERPL-MCNC: 16 MG/DL (ref 6–20)
BUN/CREAT SERPL: 18 (ref 12–20)
CALCIUM SERPL-MCNC: 8.6 MG/DL (ref 8.5–10.1)
CHLORIDE SERPL-SCNC: 105 MMOL/L (ref 97–108)
CO2 SERPL-SCNC: 30 MMOL/L (ref 21–32)
CREAT SERPL-MCNC: 0.87 MG/DL (ref 0.55–1.02)
EOSINOPHIL # BLD: 0.3 K/UL (ref 0–0.4)
EOSINOPHIL NFR BLD: 4 % (ref 0–7)
ERYTHROCYTE [DISTWIDTH] IN BLOOD BY AUTOMATED COUNT: 13.5 % (ref 11.5–14.5)
GLOBULIN SER CALC-MCNC: 4.1 G/DL (ref 2–4)
GLUCOSE BLD STRIP.AUTO-MCNC: 135 MG/DL (ref 65–100)
GLUCOSE BLD STRIP.AUTO-MCNC: 146 MG/DL (ref 65–100)
GLUCOSE BLD STRIP.AUTO-MCNC: 154 MG/DL (ref 65–100)
GLUCOSE BLD STRIP.AUTO-MCNC: 96 MG/DL (ref 65–100)
GLUCOSE SERPL-MCNC: 100 MG/DL (ref 65–100)
HCT VFR BLD AUTO: 32.4 % (ref 35–47)
HGB BLD-MCNC: 10.4 G/DL (ref 11.5–16)
INR PPP: 2.3 (ref 0.9–1.1)
LYMPHOCYTES # BLD AUTO: 30 % (ref 12–49)
LYMPHOCYTES # BLD: 2.2 K/UL (ref 0.8–3.5)
MCH RBC QN AUTO: 32.1 PG (ref 26–34)
MCHC RBC AUTO-ENTMCNC: 32.1 G/DL (ref 30–36.5)
MCV RBC AUTO: 100 FL (ref 80–99)
MONOCYTES # BLD: 0.7 K/UL (ref 0–1)
MONOCYTES NFR BLD AUTO: 9 % (ref 5–13)
NEUTS SEG # BLD: 4.1 K/UL (ref 1.8–8)
NEUTS SEG NFR BLD AUTO: 56 % (ref 32–75)
PLATELET # BLD AUTO: 299 K/UL (ref 150–400)
POTASSIUM SERPL-SCNC: 4.3 MMOL/L (ref 3.5–5.1)
PROT SERPL-MCNC: 6.3 G/DL (ref 6.4–8.2)
PROTHROMBIN TIME: 23.3 SEC (ref 9–11.1)
RBC # BLD AUTO: 3.24 M/UL (ref 3.8–5.2)
SERVICE CMNT-IMP: ABNORMAL
SERVICE CMNT-IMP: NORMAL
SODIUM SERPL-SCNC: 140 MMOL/L (ref 136–145)
WBC # BLD AUTO: 7.3 K/UL (ref 3.6–11)

## 2017-01-19 PROCEDURE — 97530 THERAPEUTIC ACTIVITIES: CPT

## 2017-01-19 PROCEDURE — 85610 PROTHROMBIN TIME: CPT | Performed by: FAMILY MEDICINE

## 2017-01-19 PROCEDURE — 65660000000 HC RM CCU STEPDOWN

## 2017-01-19 PROCEDURE — 82962 GLUCOSE BLOOD TEST: CPT

## 2017-01-19 PROCEDURE — 85025 COMPLETE CBC W/AUTO DIFF WBC: CPT | Performed by: FAMILY MEDICINE

## 2017-01-19 PROCEDURE — 36415 COLL VENOUS BLD VENIPUNCTURE: CPT | Performed by: FAMILY MEDICINE

## 2017-01-19 PROCEDURE — 80053 COMPREHEN METABOLIC PANEL: CPT | Performed by: FAMILY MEDICINE

## 2017-01-19 PROCEDURE — 74011250637 HC RX REV CODE- 250/637: Performed by: INTERNAL MEDICINE

## 2017-01-19 RX ORDER — WARFARIN SODIUM 5 MG/1
5 TABLET ORAL ONCE
Status: COMPLETED | OUTPATIENT
Start: 2017-01-19 | End: 2017-01-19

## 2017-01-19 RX ADMIN — BRIMONIDINE TARTRATE 1 DROP: 1 SOLUTION/ DROPS OPHTHALMIC at 10:03

## 2017-01-19 RX ADMIN — MEMANTINE HYDROCHLORIDE 28 MG: 28 CAPSULE, EXTENDED RELEASE ORAL at 10:03

## 2017-01-19 RX ADMIN — METOPROLOL TARTRATE 75 MG: 25 TABLET ORAL at 10:03

## 2017-01-19 RX ADMIN — DORZOLAMIDE HYDROCHLORIDE AND TIMOLOL MALEATE 1 DROP: 20; 5 SOLUTION/ DROPS OPHTHALMIC at 10:04

## 2017-01-19 RX ADMIN — DORZOLAMIDE HYDROCHLORIDE AND TIMOLOL MALEATE 1 DROP: 20; 5 SOLUTION/ DROPS OPHTHALMIC at 22:16

## 2017-01-19 RX ADMIN — BRIMONIDINE TARTRATE 1 DROP: 1 SOLUTION/ DROPS OPHTHALMIC at 22:15

## 2017-01-19 RX ADMIN — DISOPYRAMIDE PHOSPHATE 100 MG: 100 CAPSULE ORAL at 10:03

## 2017-01-19 RX ADMIN — BIMATOPROST 1 DROP: 0.1 SOLUTION/ DROPS OPHTHALMIC at 22:20

## 2017-01-19 RX ADMIN — QUETIAPINE FUMARATE 12.5 MG: 25 TABLET, FILM COATED ORAL at 22:38

## 2017-01-19 RX ADMIN — WARFARIN SODIUM 5 MG: 5 TABLET ORAL at 17:43

## 2017-01-19 NOTE — CONSULTS
Palliative Medicine Consult  [place]: [phone] ([phone])    Patient Name: Michelle Gould  YOB: 1940    Date of Initial Consult: 1/10/16  Reason for Consult: Care decisions  Requesting Provider: Dr. Jackie Pelletier   Primary Care Physician: Divine Hernandez MD      SUMMARY:   Michelle Gould is a 68y.o. year old with a past history of CVA, CAD, Dementia, HLD, Hypertrophic CM, PAF, Aphasia, who was admitted on 1/9/2017 from [facility] with a diagnosis of DVT, HATTIE. Current medical issues leading to Palliative Medicine involvement include: Care decisions. Chart reviewed. Patient seen by our team in October after her stroke. Talked with her , patient still total assist with having to be fed, bed bound. They are looking to move her closer to their home in [address]:   1. Goals of care discussion  2. Debility  3. CVA  4. FTT  5. Acute DVT       PLAN:   1. Serenity and I able to meet with patient's  who is at bedside. Discussion earlier in the day with [facility]) and family had approached her about options for help especially if she were to go for a period of time. 2. Patient has [place] which will end at the end of this month and the plan is to go [facility] with Medicare at that time but options for other facilities limited right now. Family consider taking home for next 10 days until she can go to [facility]  3. Discussed options with her  but really wanted us to talk with Terrell Cuello, granddaughter who does a lot of organizing of family. I was able to talk with Terrell Cuello over the phone and we discussed options such as [facility] vs Hospice vs paid caregivers vs possible medicaid workers. Outline how much help could be provided by these options and she did want to talk further about Hospice. Given patient's stroke, poor po intake, albumin of 2.1 and now significant progress in last 3 months, I suspect she potentially is a candidate for Hospice.  Hospice care could be provided within the home and also go to [facility]. Ajith Arora interested in informational session so will place consult to [place] [id number] which serves this area. 4. Code status-remains DNR status  5. AMD-the one on file in our EMR is financial. Her  remains MPOA by law. 6. Psychosocial support- frustrated his wife has not made a lot of progress from the CVA but has support from other family. Hoping to move her closer to home to [facility]  7. Initial consult note routed to primary continuity provider  8. Communicated plan of care with: Palliative [facility]       GOALS OF CARE / TREATMENT PREFERENCES:   [====Goals of Care====]  GOALS OF CARE:  Patient / health care proxy stated goals: take her to [facility] per       TREATMENT PREFERENCES:   Code Status: DNR    Advance Care Planning:  Advance Care Planning 1/10/2017   Patient's Healthcare Decision Maker is: Legal Next of Little 69   Primary Decision Maker Name Cuco Adame   Primary Decision Maker Phone Number [phone]   Primary Decision Maker Relationship to Patient Spouse   Secondary Decision Maker Name -   Secondary Decision Maker Phone Number -   Secondary Decision Maker Relationship to Patient -   Confirm Advance Directive -   Patient Would Like to Complete Advance Directive -   Does the patient have other document types Do Not Resuscitate       Other:    The palliative care team has discussed with patient / health care proxy about goals of care / treatment preferences for patient.  [====Goals of Care====]         HISTORY:     History obtained from: chart, nursing staff, family    CHIEF COMPLAINT: leg pain    HPI/SUBJECTIVE:    The patient is:   [] Verbal and participatory  [x] Non-participatory due to: CVA  Patient is awake.  Does attempt to talk with us but expressive aphasia at times    Clinical Pain Assessment (nonverbal scale for severity on nonverbal patients):   [++++ Clinical Pain Assessment++++]  Adult Non-Verbal Pain Assessment    Face  [x] 0   No particular expression or smile  [] 1   Occasional grimace, tearing, frowning, wrinkled forehead  [] 2   Frequent grimace, tearing, frowning, wrinkled forehead    Activity (movement)  [x] 0   Lying quietly, normal position  [] 1   Seeking attention through movement or slow, cautious movement  [] 2   Restless, excessive activity and/or withdrawal reflexes    Guarding  [x] 0   Lying quietly, no positioning of hands over areas of body  [] 1   Splinting areas of the body, tense  [] 2   Rigid, stiff    Physiology (vital signs)  [x] 0   Stable vital signs  [] 1   Change in any of the following: SBP > 20mm Hg; HR > 20/minute  [] 2   Change in any of the following: SBP > 30mm Hg; HR > 25/minute    Respiratory  [x] 0   Baseline RR/SpO2, compliant with ventilator  [] 1   RR > 10 above baseline, or 5% drop SpO2, mild asynchrony with ventilator  [] 2   RR > 20 above baseline, or 10% drop SpO2, asynchrony with ventilator    Total Non-Verbal Pain Score: 0  [++++ Clinical Pain Assessment++++]     FUNCTIONAL ASSESSMENT:     Palliative Performance Scale (PPS):  PPS: 60       PSYCHOSOCIAL/SPIRITUAL SCREENING:     Advance Care Planning:  Advance Care Planning 1/10/2017   Patient's Healthcare Decision Maker is: Legal Next carmen Fitch 69   Primary Decision Maker Name Juana Balderas   Primary Decision Maker Phone Number [phone]   Primary Decision Maker Relationship to Patient Spouse   Secondary Decision Maker Name -   Secondary Decision Maker Phone Number -   Secondary Decision Maker Relationship to Patient -   Confirm Advance Directive -   Patient Would Like to Complete Advance Directive -   Does the patient have other document types Do Not Resuscitate        Any spiritual / Scientologist concerns:  [] Yes /  [x] No    Caregiver Burnout:  [] Yes /  [x] No /  [] No Caregiver Present      Anticipatory grief assessment:   [x] Normal  / [] Maladaptive       ESAS Anxiety: Anxiety: 0    ESAS Depression: Depression: 0 REVIEW OF SYSTEMS:     Positive and pertinent negative findings in ROS are noted above in HPI. The following systems were [x] reviewed / [] unable to be reviewed as noted in HPI  Other findings are noted below. Systems: constitutional, ears/nose/mouth/throat, respiratory, gastrointestinal, genitourinary, musculoskeletal, integumentary, neurologic, psychiatric, endocrine. Positive findings noted below. Modified ESAS Completed by: provider   Fatigue: 2 Drowsiness: 2   Depression: 0 Pain: 0   Anxiety: 0 Nausea: 0   Anorexia: 0 Dyspnea: 0     Constipation: No     Stool Occurrence(s): 1        PHYSICAL EXAM:     Wt Readings from Last 3 Encounters:   01/09/17 136 lb (61.7 kg)   01/03/17 150 lb (68 kg)   12/27/16 143 lb 6.4 oz (65 kg)     Blood pressure 120/64, pulse 71, temperature 97.9 °F (36.6 °C), resp. rate 16, height 5' 4\" (1.626 m), weight 136 lb (61.7 kg), SpO2 96 %, not currently breastfeeding.   Pain:  Pain Scale 1: Adult Nonverbal Pain Scale  Pain Intensity 1: 0                 Last bowel movement, if known:     Constitutional: awake, attempts to talk, states doing\"ok\"  Eyes: pupils equal, anicteric  ENMT: no nasal discharge, moist mucous membranes  Cardiovascular: regular rhythm, distal pulses intact  Respiratory: breathing not labored, symmetric  Gastrointestinal: soft non-tender, +bowel sounds  Musculoskeletal: no deformity, no tenderness to palpation  Skin: warm, dry  Neurologic: awake,, expressive aphasia, limited motion on the right side  Other:       HISTORY:     Active Problems:    Chronic diastolic heart failure (HCC) (2/26/2011)      Paroxysmal atrial fibrillation ([facility] (11/29/2011)      Hypertrophic cardiomyopathy ([facility] (6/1/2012)      Glaucoma (1/13/2014)      Hyperlipidemia LDL goal <70 (10/25/2016)      Cerebral atrophy (10/25/2016)      Dementia (10/25/2016)      Weakness due to cerebrovascular accident (CVA) ([facility] (10/26/2016)      Goals of care, counseling/discussion (10/26/2016) Deep venous thrombosis ([facility] (1/9/2017)      CAD (coronary artery disease) ()      Dehydration (1/9/2017)      ARF (acute renal failure) ([facility] (1/9/2017)      Past Medical History   Diagnosis Date    CAD (coronary artery disease)     Carotid occlusion, left 10/25/2016    Cerebral atrophy 10/25/2016    Dementia     Depression     Glaucoma     Hx of completed stroke     Hyperlipidemia LDL goal <70 10/25/2016    Hypertension     Hypertrophic cardiomyopathy ([facility] 6/1/2012    Hypertrophic subaortic stenosis (idiopathic) (HCC)     Mitral regurgitation     Paroxysmal atrial fibrillation ([facility] 11/29/2011      Past Surgical History   Procedure Laterality Date    Cardiac catheterization  2004     Normal cors, EF 60%, significant MR. EDP 23    Echo 2d adult  8/20/2010     HCM, Resting LVOT gradient 129 mmHg, grade 3-4 diastolic dysfunction, MAC, mod-severe MR, marked LAE.  Echo 2d adult  5/27/11     mod-severe LVH, EF 65%, grade 3-4 diastolic dysfunction, LVOT resting gradient 42 mmHg, severe LAE, mod-severe MR, PA 46 mmHg    Pr cardiac surg procedure unlist        Family History   Problem Relation Age of Onset    Other Other      patient specifically denies the following in 1st degree relative: HTN, DM, CVA, CAD, ca    Heart Disease Mother       History reviewed, no pertinent family history.   Social History   Substance Use Topics    Smoking status: Never Smoker    Smokeless tobacco: Never Used    Alcohol use No     No Known Allergies   Current Facility-Administered Medications   Medication Dose Route Frequency    warfarin (COUMADIN) tablet 5 mg  5 mg Oral ONCE    bisacodyl (DULCOLAX) suppository 10 mg  10 mg Rectal DAILY PRN    albuterol (ACCUNEB) nebulizer solution 1.25 mg  1.25 mg Nebulization Q4H PRN    bimatoprost (LUMIGAN) 0.01 % ophthalmic drops 1 Drop  1 Drop Right Eye QHS    brimonidine (ALPHAGAN P) 0.1 % ophthalmic solution 1 Drop  1 Drop Right Eye BID    dorzolamide-timolol (COSOPT) 22.3-6.8 mg/mL ophthalmic solution 1 Drop  1 Drop Right Eye BID    memantine ER (NAMENDA XR) capsule 28 mg  28 mg Oral DAILY    QUEtiapine (SEROquel) tablet 12.5 mg  12.5 mg Oral QHS    naloxone (NARCAN) injection 0.4 mg  0.4 mg IntraVENous PRN    0.9% sodium chloride infusion  50 mL/hr IntraVENous CONTINUOUS    acetaminophen (TYLENOL) tablet 650 mg  650 mg Oral Q4H PRN    HYDROcodone-acetaminophen (NORCO) 5-325 mg per tablet 1 Tab  1 Tab Oral Q4H PRN    diphenhydrAMINE (BENADRYL) capsule 25 mg  25 mg Oral Q4H PRN    ondansetron (ZOFRAN) injection 4 mg  4 mg IntraVENous Q4H PRN    metoprolol tartrate (LOPRESSOR) tablet 75 mg  75 mg Oral BID    disopyramide phosphate (NORPACE) capsule 100 mg  100 mg Oral BID    Warfarin- Pharmacy to dose daily   Other Rx Dosing/Monitoring          LAB AND IMAGING FINDINGS:     Lab Results   Component Value Date/Time    WBC 7.3 01/19/2017 04:51 AM    HGB 10.4 01/19/2017 04:51 AM    PLATELET 438 93/49/9022 04:51 AM     Lab Results   Component Value Date/Time    Sodium 140 01/19/2017 04:51 AM    Potassium 4.3 01/19/2017 04:51 AM    Chloride 105 01/19/2017 04:51 AM    CO2 30 01/19/2017 04:51 AM    BUN 16 01/19/2017 04:51 AM    Creatinine 0.87 01/19/2017 04:51 AM    Calcium 8.6 01/19/2017 04:51 AM    Magnesium 2.1 10/27/2016 12:58 AM    Phosphorus 2.5 01/16/2014 04:12 AM      Lab Results   Component Value Date/Time    AST 25 01/19/2017 04:51 AM    Alk.  phosphatase 94 01/19/2017 04:51 AM    Protein, total 6.3 01/19/2017 04:51 AM    Albumin 2.2 01/19/2017 04:51 AM    Globulin 4.1 01/19/2017 04:51 AM     Lab Results   Component Value Date/Time    INR 2.3 01/19/2017 06:04 AM    INR (POC) 1.0 10/24/2016 06:33 PM    INR POC 2.2 05/28/2015 01:01 PM    Prothrombin time 23.3 01/19/2017 06:04 AM    aPTT 74.5 06/24/2015 03:00 AM      Lab Results   Component Value Date/Time    Iron 49 01/15/2014 11:45 AM    TIBC 252 01/15/2014 11:45 AM    Iron % saturation 19 01/15/2014 11:45 AM Ferritin 84 01/15/2014 11:45 AM      No results found for: PH, PCO2, PO2  No components found for: [place]   Lab Results   Component Value Date/Time    CK 62 01/03/2017 08:24 PM    CK - MB 1.2 01/03/2017 08:24 PM                Total time:35  Counseling / coordination time: 30  > 50% counseling / coordination?: yes    Prolonged service was provided for  []30 min   []75 min in face to face time in the presence of the patient. Time Start:   Time End:   Note: this can only be billed with 82409 (initial) or 33443 (follow up). If multiple start / stop times, list each separately.

## 2017-01-19 NOTE — PROGRESS NOTES
Occupational Therapy    Nursing cleared for OT. Attempted OT. Multiple family members present in room and meeting with case management regarding discharge plans. Will continue to follow.       Thank you,    Vu Patel OTR/L

## 2017-01-19 NOTE — PROGRESS NOTES
Problem: Mobility Impaired (Adult and Pediatric)  Goal: *Acute Goals and Plan of Care (Insert Text)  Physical Therapy Goals  Initiated 1/17/2017  1. Patient will move from supine to sit and sit to supine in bed with moderate assistance within 7 day(s). 2. Patient will tolerate sitting up in chair position of bed for 1.5 hour duration for improved upright activity within 7 days. 3. Patient will tolerate wilfredo transfer to chair with MAX A within 7 days. PHYSICAL THERAPY TREATMENT  Patient: Kipp Prader (78 y.o. female)  Date: 1/19/2017  Diagnosis: Deep venous thrombosis (HCC) <principal problem not specified>       Precautions: Fall      ASSESSMENT:  Patient still with nonpurpose movement. Randomly moves extremities (UE) with sitting. Required initial sitting CGA but with increased time she has increased fatigue and requires MOD A for stability due to right lateral lean. She does not follow the verbal command for correction, but does respond less than 50% of the time to a tactile cue. Still recommend SNF. Patient incontinent of urine required linen changing. Progression toward goals:  [X]    Improving appropriately and progressing toward goals  [ ]    Improving slowly and progressing toward goals  [ ]    Not making progress toward goals and plan of care will be adjusted       PLAN:  Patient continues to benefit from skilled intervention to address the above impairments. Continue treatment per established plan of care.   Discharge Recommendations:  Juan David Kwok  Further Equipment Recommendations for Discharge:         SUBJECTIVE:   Patient stated .  hi  OBJECTIVE DATA SUMMARY:   Critical Behavior:  Neurologic State: Alert, Confused, Eyes open spontaneously  Orientation Level: Unable to verbalize  Cognition: Decreased attention/concentration, Decreased command following     Functional Mobility Training:  Bed Mobility:  Rolling: Maximum assistance  Supine to Sit: Maximum assistance  Sit to Supine: Maximum assistance                          Neuro Re-Education:     Therapeutic Exercises:      Pain:  Pain Scale 1: Adult Nonverbal Pain Scale  Pain Intensity 1: 0              Activity Tolerance:   No complications  Please refer to the flowsheet for vital signs taken during this treatment.   After treatment:   [ ]    Patient left in no apparent distress sitting up in chair  [X]    Patient left in no apparent distress in bed  [X]    Call bell left within reach  [X]    Nursing notified  [ ]    Caregiver present  [ ]    Bed alarm activated      COMMUNICATION/COLLABORATION:   The patients plan of care was discussed with: Registered Nurse     Carole Kohler, PT, DPT   Time Calculation: 30 mins

## 2017-01-19 NOTE — PROGRESS NOTES
[facility] Dosing Services: 1/19/2017     Consult for Warfarin Dosing by Pharmacy by Dr. Enrico Vanessa provided for this 68 y.o. female for indication of Venous Thrombosis. Day of Therapy: continued from home 5mg Monday, Tuesday, Wednesday ,Thursday, and Friday. 6mg on Saturday and Sunday. INR 1.3 on admission. Dose to achieve an INR goal of 2-3     INR 2.3 (Therapeutic)     Order entered for Warfarin 5 mg to be given today at 18:00. Significant drug interactions: none  LABS    PT/INR Lab Results   Component Value Date/Time    INR 2.3 01/19/2017 06:04 AM    INR (POC) 1.0 10/24/2016 06:33 PM    INR POC 2.2 05/28/2015 01:01 PM      Platelets Lab Results   Component Value Date/Time    PLATELET 122 90/52/6006 04:51 AM      H/H     Lab Results   Component Value Date/Time    HGB 10.4 01/19/2017 04:51 AM        Warfarin Administrations (last 168 hours)       Date/Time Action Medication Dose      01/18/17 1914 Given    warfarin (COUMADIN) tablet 5 mg 5 mg    01/17/17 1714 Given    warfarin (COUMADIN) tablet 4 mg 4 mg    01/16/17 1800 Given   [held per order]    Warfarin - HOLD dose for 1/16/17     01/15/17 1709 Given    warfarin (COUMADIN) tablet 5 mg 5 mg    01/14/17 1807 Given    warfarin (COUMADIN) tablet 5 mg 5 mg    01/13/17 1737 Given    warfarin (COUMADIN) tablet 5 mg 5 mg    01/12/17 1850 Given    warfarin (COUMADIN) tablet 4 mg 4 mg          Pharmacy to follow daily and will provide subsequent Warfarin dosing based on clinical status. Thank you for the consult,  Trey Gamez

## 2017-01-19 NOTE — PROGRESS NOTES
Palliative Medicine  [place]: [phone] ([phone])  [facility]: [phone] ([phone])      Resuscitation Status: DNR   Durable DNR addressed? [x] Yes   [] No    [] Not Applicable *Already in place    Advance Care Planning 1/10/2017   Patient's Healthcare Decision Maker is: Legal Next of Little [address]   Primary Decision Maker Name Jose Woo   Primary Decision Maker Phone Number [phone]   Primary Decision Maker Relationship to Patient Spouse   Secondary Decision Maker Name -   Secondary Decision Maker Phone Number -   Secondary Decision Maker Relationship to Patient -   Confirm Advance Directive -   Patient Would Like to Complete Advance Directive -   Does the patient have other document types Do Not Resuscitate           Patient / Family Encounter Documentation    Participants (names): Pt,  Roni Benitez, Palliative Medicine (Dr. Shyla Gaona)    Narrative:  Pt was wide awake in bed, being fed lunch by . Pt interacted only minimally, did make eye contact when greeted. No signs of distress noted.  shared that plan is for pt to relocate to [facility] when her insurance coverage changes in February. Family exploring alternate arrangements in the meantime, including possibly caring for pt at home.  reports they have 4 sons and numerous grand- and great-grandchildren (as well as 1 great-great-grandchild). Pt and 's only dtr  suddenly approximately 3 yrs ago;  shared that pt has never really thrived since the loss of dtr.  shared that family members are very supportive and would likely be able to assist in pt's care if she were to return home on a temporary basis. Dr. Yazmin Gee spoke with [facility] by phone re: hospice in response to questions from family, please see separate note for details.     Psychosocial Issues Identified: Caregiver stress    Goals of Care / Plan: Hospice consult to be placed for possible care at home (to continue at [facility]) if pt meets criteria. Family inquired about Medicaid personal care services as well, unclear how challenging it would be to staff case in pt's area for short term. Will discuss with Care Manager. SW will continue to follow for support. Thank you for including Palliative Medicine in the care of Ms. Yanet Delaney.     Jazlyn Cuevas LCSW, [facility] Hospital[facility]-SW  [phone] ([phone])

## 2017-01-19 NOTE — PROGRESS NOTES
8424:  [facility] denied pt as they have no beds. Order for hospice noted. A referral was sent in [place] to [facility]. Awaiting reply. ANUJA Haider    1756:  [place] [facility] called to say that [facility] had denied pt. ANUJA Haider     Note:  [facility] did not accept pt. Pt's granddaughter, Bruce Corral, called and wanted to know if pt could be considered for hospice at home. I spoke with Dr. Ti Peace who will review notes and reach out to family.   ANUJA Haider

## 2017-01-19 NOTE — PROGRESS NOTES
Daily Progress Note: 1/19/2017  Cintia Limon MD    Assessment/Plan:   Deep venous thrombosis - New acute and extensive. --pharmacy dosing coumadin       ARF (acute renal failure) / Dehydration - POA, likely due to poor PO intake. Hydrate. Hold PO K and hold Bumex  --Cr back to baseline, K normal     Hypertrophic cardiomyopathy / Chronic diastolic heart failure -no exacerbation  -Hold bumex, continue metoprolol, home norpace     Dementia / Cerebral atrophy - Severe. Continue seroquel and memantine.     Paroxysmal atrial fibrillation / CAD (coronary artery disease) - No acute symptoms. Continue BB. Not on ASA. Hold statin.     Glaucoma - Continue lumigan, alphagan and cospot     Hyperlipidemia- Given age and co morbidities, I would not test or treat hyperlipidemia     Weakness due to cerebrovascular accident - Fall precautions. Pureed and nector diet.  She is at baseline, making no improvement since stroke.       No UTI - Colonization with VRE   - repeat urine culture with mult bacteria - likely colonization given no symptoms, no fever           Problem List:  Problem List as of 1/19/2017  Date Reviewed: 1/9/2017          Codes Class Noted - Resolved    Deep venous thrombosis ([facility] ICD-10-CM: I82.409  ICD-9-CM: 453.40  1/9/2017 - Present        CAD (coronary artery disease) ICD-10-CM: I25.10  ICD-9-CM: 414.00  Unknown - Present        Dehydration ICD-10-CM: E86.0  ICD-9-CM: 276.51  1/9/2017 - Present        ARF (acute renal failure) ([facility] ICD-10-CM: N17.9  ICD-9-CM: 584.9  1/9/2017 - Present        Weakness due to cerebrovascular accident (CVA) ([facility] ICD-10-CM: I63.9, R53.1  ICD-9-CM: 434.91, 780.79  10/26/2016 - Present        Goals of care, counseling/discussion ICD-10-CM: Z71.89  ICD-9-CM: V65.49  10/26/2016 - Present        Hyperlipidemia LDL goal <70 ICD-10-CM: E78.5  ICD-9-CM: 272.4  10/25/2016 - Present        Cerebral atrophy ICD-10-CM: G31.9  ICD-9-CM: 331.9  10/25/2016 - Present Dementia ICD-10-CM: F03.90  ICD-9-CM: 294.20  10/25/2016 - Present        Glaucoma ICD-10-CM: H40.9  ICD-9-CM: 365.9  1/13/2014 - Present        Hypertrophic cardiomyopathy ([facility] ICD-10-CM: I42.2  ICD-9-CM: 425.18  6/1/2012 - Present        Paroxysmal atrial fibrillation ([facility] ICD-10-CM: I48.0  ICD-9-CM: 427.31  11/29/2011 - Present        Chronic diastolic heart failure (HCC) ICD-10-CM: I50.32  ICD-9-CM: 428.32  2/26/2011 - Present        RESOLVED: Carotid occlusion, left ICD-10-CM: I65.22  ICD-9-CM: 433.10  10/25/2016 - 1/9/2017        RESOLVED: Acute CVA (cerebrovascular accident) ([facility] ICD-10-CM: I63.9  ICD-9-CM: 434.91  10/24/2016 - 1/9/2017        RESOLVED: Orthostatic hypotension ICD-10-CM: I95.1  ICD-9-CM: 458.0  10/28/2015 - 1/9/2017        RESOLVED: Acute embolic stroke ([facility]--PI: I63.9  ICD-9-CM: 434.11  6/26/2015 - 1/9/2017        RESOLVED: Dyslipidemia ICD-10-CM: E78.5  ICD-9-CM: 272.4  6/25/2015 - 1/9/2017        RESOLVED: Pulmonary nodule ICD-10-CM: R91.1  ICD-9-CM: 793.11  6/25/2015 - 1/9/2017        RESOLVED: Receptive aphasia ICD-10-CM: R47.01  ICD-9-CM: 784.3  6/25/2015 - 8/23/2015        RESOLVED: Dysarthria ICD-10-CM: R47.1  ICD-9-CM: 784.51  6/25/2015 - 8/23/2015        RESOLVED: Expressive aphasia ICD-10-CM: R47.01  ICD-9-CM: 784.3  6/25/2015 - 8/23/2015        RESOLVED: ADHF (acute decompensated heart failure) ([facility] ICD-10-CM: I50.9  ICD-9-CM: 428.0  6/24/2015 - 8/23/2015        RESOLVED: Cerebral ventriculomegaly due to brain atrophy ICD-10-CM: G31.9  ICD-9-CM: 331.9  6/24/2015 - 8/23/2015        RESOLVED: Left-sided visual neglect ICD-10-CM: R41.4  ICD-9-CM: 781.8  6/24/2015 - 8/23/2015        RESOLVED: Chest pain ICD-10-CM: R07.9  ICD-9-CM: 786.50  6/23/2015 - 8/23/2015        RESOLVED: RYAN (dyspnea on exertion) ICD-10-CM: R06.09  ICD-9-CM: 786.09  5/22/2015 - 8/23/2015        RESOLVED: Chest tightness ICD-10-CM: R07.89  ICD-9-CM: 786.59  5/22/2015 - 8/23/2015        RESOLVED: Anemia ICD-10-CM: D64.9  ICD-9-CM: 285.9  1/29/2014 - 1/9/2017        RESOLVED: Supratherapeutic INR ICD-10-CM: R79.1  ICD-9-CM: 790.92  1/13/2014 - 1/29/2014        RESOLVED: Melena ICD-10-CM: K92.1  ICD-9-CM: 578.1  1/13/2014 - 1/29/2014        RESOLVED: Macrocytic anemia ICD-10-CM: D53.9  ICD-9-CM: 281.9  1/13/2014 - 1/29/2014        RESOLVED: GI bleed ICD-10-CM: K92.2  ICD-9-CM: 578.9  1/13/2014 - 1/29/2014        RESOLVED: Cough ICD-10-CM: R05  ICD-9-CM: 786.2  1/13/2014 - 1/29/2014        RESOLVED: Diarrhea ICD-10-CM: R19.7  ICD-9-CM: 787.91  1/13/2014 - 1/29/2014        RESOLVED: Coffee ground emesis ICD-10-CM: K92.0  ICD-9-CM: 578.0  1/13/2014 - 1/29/2014        RESOLVED: Left flank pain ICD-10-CM: R10.9  ICD-9-CM: 789.09  1/13/2014 - 1/29/2014        RESOLVED: Weakness generalized ICD-10-CM: R53.1  ICD-9-CM: 780.79  1/13/2014 - 8/23/2015        RESOLVED: Hypoxia ICD-10-CM: R09.02  ICD-9-CM: 799.02  1/13/2014 - 1/29/2014        RESOLVED: CAP (community acquired pneumonia) ICD-10-CM: J18.9  ICD-9-CM: 056  1/13/2014 - 1/29/2014        RESOLVED: Pericardial effusion ICD-10-CM: I31.3  ICD-9-CM: 423.9  1/13/2014 - 1/29/2014        RESOLVED: Elevated blood pressure reading without diagnosis of hypertension ICD-10-CM: R03.0  ICD-9-CM: 796.2  1/13/2014 - 1/29/2014        RESOLVED: Mitral regurgitation ICD-10-CM: I34.0  ICD-9-CM: 424.0  10/22/2010 - 1/9/2017              Subjective:    68 y.o.  female who presented to the Emergency Department complaining of leg swelling. She provides no hx. Family is present. I have reviewed records. A few months ago she had a CVA, and has been persistently more immobile since. She has severe dementia and other medical issues. Family noted LLE swelling yesterday, and she had mild LLE discomfort today. She is supposed to be on coumadin for Afib, but her INR is only 1.3. ER workup shows extensive LLE DVT and ARF. 1/10:  Dopplers revealed extensive DVT LLE.   Pt poorly responsive at baseline since CVA - now seems worse. Lovenox until coumadin therapeutic.     :  Little change from yesterday and remains poorly responsive. She is stable for [facility].     : More alert today. Nursing reports she was more alert yesterday and took diet well. She is stable for [facility].     : Sleeping but arousable. Case management working on placement. She is stable for d/c.    : Patient is awake and alert this morning. She looks comfortable. Able to say yes/no a few times but unreliable answers. Believes she is at home. Does not seem to be in pain. No acute events overnight. 1/15: No acute events overnight. Patient answers simple yes/no questions but unreliable. :  Little change. Alert but not oriented at all. Seems to be in no distress. Stable for [facility].      :  Little change. She said \"OK\" this AM when asked how she was doing but no other speech to questions. Stable for SCF.    :  Little change. Less verbal this AM.  Moves extrem spontaneously but not to command. Stable for [facility] or LTC.    :  Turns head to name but not verbal and does not follow commands. Stable for LTC or SCF. Review of Systems:   Review of systems not obtained due to patient factors. Objective:   Physical Exam:     Visit Vitals    /71 (BP 1 Location: Left arm, BP Patient Position: At rest)    Pulse 72    Temp 97.5 °F (36.4 °C)    Resp 16    Ht 5' 4\" (1.626 m)    Wt 61.7 kg (136 lb)    SpO2 93%    Breastfeeding No    BMI 23.34 kg/m2      O2 Device: Room air    Temp (24hrs), Av °F (36.7 °C), Min:97.5 °F (36.4 °C), Max:98.8 °F (37.1 °C)    701 -  190  In: -   Out: 800 [Urine:800]        General:   awake and alert   Head:  Normocephalic, without obvious abnormality, atraumatic. Eyes:  Conjunctivae/corneas clear. EOMs intact. Nose: Nares normal. Septum midline. Mucosa normal. No drainage or sinus tenderness. Throat: Lips, mucosa, and tongue moist.. Neck: Supple, symmetrical, trachea midline   Lungs:   Ctab, no w/r/r   Heart:  Regular rate and rhythm, S1, S2 normal, 2/6 murmur loudest axilla, apex and LSB. No click, rub or gallop. Abdomen:   Soft, apparently non-tender. Bowel sounds normal. No masses,  No organomegaly. Extremities: no cyanosis or RLE edema; LLE with trace to 1+ edema. Skin: Skin color, texture, turgor normal. No rashes or lesions   Neurologic:  Alert and oriented X 0. Moves limbs spontaneously. Does not follow commands. Will eat when fed. Data Review:     Jong GRIMM Dopplers 1/9/17:  CONCLUSION: Left lower extremity venous duplex positive for acute deep  venous thrombosis and thrombophlebitis throughout the left leg from  external iliac vein down through the calf. Right common femoral vein  is thrombus free.     Recent Days:  Recent Labs      01/19/17   0451   WBC  7.3   HGB  10.4*   HCT  32.4*   PLT  299     Recent Labs      01/19/17   0604  01/19/17   0451  01/18/17   0621  01/17/17   0251   NA   --   140   --    --    K   --   4.3   --    --    CL   --   105   --    --    CO2   --   30   --    --    GLU   --   100   --    --    BUN   --   16   --    --    CREA   --   0.87   --    --    CA   --   8.6   --    --    ALB   --   2.2*   --    --    TBILI   --   0.4   --    --    SGOT   --   25   --    --    ALT   --   29   --    --    INR  2.3*   --   2.2*  3.1*     No results for input(s): PH, PCO2, PO2, HCO3, FIO2 in the last 72 hours.     24 Hour Results:  Recent Results (from the past 24 hour(s))   GLUCOSE, POC    Collection Time: 01/18/17 11:10 AM   Result Value Ref Range    Glucose (POC) 88 65 - 100 mg/dL    Performed by [employer] (PCT)    GLUCOSE, POC    Collection Time: 01/18/17  4:07 PM   Result Value Ref Range    Glucose (POC) 132 (H) 65 - 100 mg/dL    Performed by [employer] (PCT)    GLUCOSE, POC    Collection Time: 01/18/17  8:38 PM   Result Value Ref Range    Glucose (POC) 118 (H) 65 - 100 mg/dL    Performed by DALTON TOLBERT    METABOLIC PANEL, COMPREHENSIVE    Collection Time: 01/19/17  4:51 AM   Result Value Ref Range    Sodium 140 136 - 145 mmol/L    Potassium 4.3 3.5 - 5.1 mmol/L    Chloride 105 97 - 108 mmol/L    CO2 30 21 - 32 mmol/L    Anion gap 5 5 - 15 mmol/L    Glucose 100 65 - 100 mg/dL    BUN 16 6 - 20 MG/DL    Creatinine 0.87 0.55 - 1.02 MG/DL    BUN/Creatinine ratio 18 12 - 20      GFR est AA >60 >60 ml/min/1.73m2    GFR est non-AA >60 >60 ml/min/1.73m2    Calcium 8.6 8.5 - 10.1 MG/DL    Bilirubin, total 0.4 0.2 - 1.0 MG/DL    ALT 29 12 - 78 U/L    AST 25 15 - 37 U/L    Alk. phosphatase 94 45 - 117 U/L    Protein, total 6.3 (L) 6.4 - 8.2 g/dL    Albumin 2.2 (L) 3.5 - 5.0 g/dL    Globulin 4.1 (H) 2.0 - 4.0 g/dL    A-G Ratio 0.5 (L) 1.1 - 2.2     CBC WITH AUTOMATED DIFF    Collection Time: 01/19/17  4:51 AM   Result Value Ref Range    WBC 7.3 3.6 - 11.0 K/uL    RBC 3.24 (L) 3.80 - 5.20 M/uL    HGB 10.4 (L) 11.5 - 16.0 g/dL    HCT 32.4 (L) 35.0 - 47.0 %    .0 (H) 80.0 - 99.0 FL    MCH 32.1 26.0 - 34.0 PG    MCHC 32.1 30.0 - 36.5 g/dL    RDW 13.5 11.5 - 14.5 %    PLATELET 088 412 - 207 K/uL    NEUTROPHILS 56 32 - 75 %    LYMPHOCYTES 30 12 - 49 %    MONOCYTES 9 5 - 13 %    EOSINOPHILS 4 0 - 7 %    BASOPHILS 1 0 - 1 %    ABS. NEUTROPHILS 4.1 1.8 - 8.0 K/UL    ABS. LYMPHOCYTES 2.2 0.8 - 3.5 K/UL    ABS. MONOCYTES 0.7 0.0 - 1.0 K/UL    ABS. EOSINOPHILS 0.3 0.0 - 0.4 K/UL    ABS.  BASOPHILS 0.0 0.0 - 0.1 K/UL   PROTHROMBIN TIME + INR    Collection Time: 01/19/17  6:04 AM   Result Value Ref Range    INR 2.3 (H) 0.9 - 1.1      Prothrombin time 23.3 (H) 9.0 - 11.1 sec   GLUCOSE, POC    Collection Time: 01/19/17  7:22 AM   Result Value Ref Range    Glucose (POC) 96 65 - 100 mg/dL    Performed by Boni Turner        Medications reviewed  Current Facility-Administered Medications   Medication Dose Route Frequency    bisacodyl (DULCOLAX) suppository 10 mg  10 mg Rectal DAILY PRN    albuterol (ACCUNEB) nebulizer solution 1.25 mg  1.25 mg Nebulization Q4H PRN    bimatoprost (LUMIGAN) 0.01 % ophthalmic drops 1 Drop  1 Drop Right Eye QHS    brimonidine (ALPHAGAN P) 0.1 % ophthalmic solution 1 Drop  1 Drop Right Eye BID    dorzolamide-timolol (COSOPT) 22.3-6.8 mg/mL ophthalmic solution 1 Drop  1 Drop Right Eye BID    memantine ER (NAMENDA XR) capsule 28 mg  28 mg Oral DAILY    QUEtiapine (SEROquel) tablet 12.5 mg  12.5 mg Oral QHS    naloxone (NARCAN) injection 0.4 mg  0.4 mg IntraVENous PRN    0.9% sodium chloride infusion  50 mL/hr IntraVENous CONTINUOUS    acetaminophen (TYLENOL) tablet 650 mg  650 mg Oral Q4H PRN    HYDROcodone-acetaminophen (NORCO) 5-325 mg per tablet 1 Tab  1 Tab Oral Q4H PRN    diphenhydrAMINE (BENADRYL) capsule 25 mg  25 mg Oral Q4H PRN    ondansetron (ZOFRAN) injection 4 mg  4 mg IntraVENous Q4H PRN    metoprolol tartrate (LOPRESSOR) tablet 75 mg  75 mg Oral BID    disopyramide phosphate (NORPACE) capsule 100 mg  100 mg Oral BID    Warfarin- Pharmacy to dose daily   Other Rx Dosing/Monitoring       Silvia Bates MD

## 2017-01-20 LAB
GLUCOSE BLD STRIP.AUTO-MCNC: 111 MG/DL (ref 65–100)
GLUCOSE BLD STRIP.AUTO-MCNC: 134 MG/DL (ref 65–100)
GLUCOSE BLD STRIP.AUTO-MCNC: 166 MG/DL (ref 65–100)
GLUCOSE BLD STRIP.AUTO-MCNC: 81 MG/DL (ref 65–100)
INR PPP: 2.8 (ref 0.9–1.1)
PROTHROMBIN TIME: 29.6 SEC (ref 9–11.1)
SERVICE CMNT-IMP: ABNORMAL
SERVICE CMNT-IMP: NORMAL

## 2017-01-20 PROCEDURE — 82962 GLUCOSE BLOOD TEST: CPT

## 2017-01-20 PROCEDURE — 36415 COLL VENOUS BLD VENIPUNCTURE: CPT | Performed by: INTERNAL MEDICINE

## 2017-01-20 PROCEDURE — 85610 PROTHROMBIN TIME: CPT | Performed by: INTERNAL MEDICINE

## 2017-01-20 PROCEDURE — 65660000000 HC RM CCU STEPDOWN

## 2017-01-20 PROCEDURE — 74011250637 HC RX REV CODE- 250/637: Performed by: INTERNAL MEDICINE

## 2017-01-20 RX ADMIN — BRIMONIDINE TARTRATE 1 DROP: 1 SOLUTION/ DROPS OPHTHALMIC at 20:24

## 2017-01-20 RX ADMIN — QUETIAPINE FUMARATE 12.5 MG: 25 TABLET, FILM COATED ORAL at 21:38

## 2017-01-20 RX ADMIN — DORZOLAMIDE HYDROCHLORIDE AND TIMOLOL MALEATE 1 DROP: 20; 5 SOLUTION/ DROPS OPHTHALMIC at 20:23

## 2017-01-20 RX ADMIN — DISOPYRAMIDE PHOSPHATE 100 MG: 100 CAPSULE ORAL at 20:22

## 2017-01-20 RX ADMIN — BIMATOPROST 1 DROP: 0.1 SOLUTION/ DROPS OPHTHALMIC at 21:38

## 2017-01-20 RX ADMIN — WARFARIN SODIUM 3 MG: 2 TABLET ORAL at 18:27

## 2017-01-20 RX ADMIN — METOPROLOL TARTRATE 75 MG: 25 TABLET ORAL at 20:22

## 2017-01-20 RX ADMIN — DORZOLAMIDE HYDROCHLORIDE AND TIMOLOL MALEATE 1 DROP: 20; 5 SOLUTION/ DROPS OPHTHALMIC at 09:13

## 2017-01-20 RX ADMIN — METOPROLOL TARTRATE 75 MG: 25 TABLET ORAL at 09:14

## 2017-01-20 RX ADMIN — BRIMONIDINE TARTRATE 1 DROP: 1 SOLUTION/ DROPS OPHTHALMIC at 09:13

## 2017-01-20 RX ADMIN — DISOPYRAMIDE PHOSPHATE 100 MG: 100 CAPSULE ORAL at 09:13

## 2017-01-20 RX ADMIN — MEMANTINE HYDROCHLORIDE 28 MG: 28 CAPSULE, EXTENDED RELEASE ORAL at 09:14

## 2017-01-20 NOTE — PROGRESS NOTES
[facility] Pharmacy Dosing Services: 1/20/2017     Consult for Warfarin Dosing by Pharmacy by Dr. Enrico Vanessa provided for this 68 y.o. female for indication of Venous Thrombosis. Day of Therapy: continued from home 5mg Monday, Tuesday, Wednesday ,Thursday, and Friday. 6mg on Saturday and Sunday. INR 1.3 on admission. Dose to achieve an INR goal of 2-3     INR 2.8 (Therapeutic), but up from 2.3 yesterday so will provide a reduced dose tonight     Order entered for Warfarin 3 mg to be given today at 18:00. Significant drug interactions: none  LABS    PT/INR Lab Results   Component Value Date/Time    INR 2.8 01/20/2017 03:09 AM    INR (POC) 1.0 10/24/2016 06:33 PM    INR POC 2.2 05/28/2015 01:01 PM      Platelets Lab Results   Component Value Date/Time    PLATELET 452 30/56/5423 04:51 AM      H/H     Lab Results   Component Value Date/Time    HGB 10.4 01/19/2017 04:51 AM        Warfarin Administrations (last 168 hours)       Date/Time Action Medication Dose      01/19/17 1743 Given    warfarin (COUMADIN) tablet 5 mg 5 mg    01/18/17 1914 Given    warfarin (COUMADIN) tablet 5 mg 5 mg    01/17/17 1714 Given    warfarin (COUMADIN) tablet 4 mg 4 mg    01/16/17 1800 Given   [held per order]    Warfarin - HOLD dose for 1/16/17     01/15/17 1709 Given    warfarin (COUMADIN) tablet 5 mg 5 mg    01/14/17 1807 Given    warfarin (COUMADIN) tablet 5 mg 5 mg    01/13/17 1737 Given    warfarin (COUMADIN) tablet 5 mg 5 mg          Pharmacy to follow daily and will provide subsequent Warfarin dosing based on clinical status. Thank you for the consult,  Trey Gamez

## 2017-01-20 NOTE — PROGRESS NOTES
Daily Progress Note: 1/20/2017  Rosie Frias MD    Assessment/Plan:   Deep venous thrombosis - New acute and extensive. --pharmacy dosing coumadin       ARF (acute renal failure) / Dehydration - POA, likely due to poor PO intake. Hydrate. Hold PO K and hold Bumex  --Cr back to baseline, K normal     Hypertrophic cardiomyopathy / Chronic diastolic heart failure -no exacerbation  -Hold bumex, continue metoprolol, home norpace     Dementia / Cerebral atrophy - Severe. Continue seroquel and memantine.     Paroxysmal atrial fibrillation / CAD (coronary artery disease) - No acute symptoms. Continue BB. Not on ASA. Hold statin.     Glaucoma - Continue lumigan, alphagan and cospot     Hyperlipidemia- Given age and co morbidities, I would not test or treat hyperlipidemia     Weakness due to cerebrovascular accident - Fall precautions. Pureed and nector diet.  She is at baseline, making no improvement since stroke.       No UTI - Colonization with VRE   - repeat urine culture with mult bacteria - likely colonization given no symptoms, no fever           Problem List:  Problem List as of 1/20/2017  Date Reviewed: 1/9/2017          Codes Class Noted - Resolved    Deep venous thrombosis ([facility] ICD-10-CM: I82.409  ICD-9-CM: 453.40  1/9/2017 - Present        CAD (coronary artery disease) ICD-10-CM: I25.10  ICD-9-CM: 414.00  Unknown - Present        Dehydration ICD-10-CM: E86.0  ICD-9-CM: 276.51  1/9/2017 - Present        ARF (acute renal failure) ([facility] ICD-10-CM: N17.9  ICD-9-CM: 584.9  1/9/2017 - Present        Weakness due to cerebrovascular accident (CVA) ([facility] ICD-10-CM: I63.9, R53.1  ICD-9-CM: 434.91, 780.79  10/26/2016 - Present        Goals of care, counseling/discussion ICD-10-CM: Z71.89  ICD-9-CM: V65.49  10/26/2016 - Present        Hyperlipidemia LDL goal <70 ICD-10-CM: E78.5  ICD-9-CM: 272.4  10/25/2016 - Present        Cerebral atrophy ICD-10-CM: G31.9  ICD-9-CM: 331.9  10/25/2016 - Present Dementia ICD-10-CM: F03.90  ICD-9-CM: 294.20  10/25/2016 - Present        Glaucoma ICD-10-CM: H40.9  ICD-9-CM: 365.9  1/13/2014 - Present        Hypertrophic cardiomyopathy ([facility] ICD-10-CM: I42.2  ICD-9-CM: 425.18  6/1/2012 - Present        Paroxysmal atrial fibrillation ([facility] ICD-10-CM: I48.0  ICD-9-CM: 427.31  11/29/2011 - Present        Chronic diastolic heart failure (HCC) ICD-10-CM: I50.32  ICD-9-CM: 428.32  2/26/2011 - Present        RESOLVED: Carotid occlusion, left ICD-10-CM: I65.22  ICD-9-CM: 433.10  10/25/2016 - 1/9/2017        RESOLVED: Acute CVA (cerebrovascular accident) ([facility] ICD-10-CM: I63.9  ICD-9-CM: 434.91  10/24/2016 - 1/9/2017        RESOLVED: Orthostatic hypotension ICD-10-CM: I95.1  ICD-9-CM: 458.0  10/28/2015 - 1/9/2017        RESOLVED: Acute embolic stroke ([facility] [id number]-03-LD: I63.9  ICD-9-CM: 434.11  6/26/2015 - 1/9/2017        RESOLVED: Dyslipidemia ICD-10-CM: E78.5  ICD-9-CM: 272.4  6/25/2015 - 1/9/2017        RESOLVED: Pulmonary nodule ICD-10-CM: R91.1  ICD-9-CM: 793.11  6/25/2015 - 1/9/2017        RESOLVED: Receptive aphasia ICD-10-CM: R47.01  ICD-9-CM: 784.3  6/25/2015 - 8/23/2015        RESOLVED: Dysarthria ICD-10-CM: R47.1  ICD-9-CM: 784.51  6/25/2015 - 8/23/2015        RESOLVED: Expressive aphasia ICD-10-CM: R47.01  ICD-9-CM: 784.3  6/25/2015 - 8/23/2015        RESOLVED: ADHF (acute decompensated heart failure) ([facility] ICD-10-CM: I50.9  ICD-9-CM: 428.0  6/24/2015 - 8/23/2015        RESOLVED: Cerebral ventriculomegaly due to brain atrophy ICD-10-CM: G31.9  ICD-9-CM: 331.9  6/24/2015 - 8/23/2015        RESOLVED: Left-sided visual neglect ICD-10-CM: R41.4  ICD-9-CM: 781.8  6/24/2015 - 8/23/2015        RESOLVED: Chest pain ICD-10-CM: R07.9  ICD-9-CM: 786.50  6/23/2015 - 8/23/2015        RESOLVED: RYAN (dyspnea on exertion) ICD-10-CM: R06.09  ICD-9-CM: 786.09  5/22/2015 - 8/23/2015        RESOLVED: Chest tightness ICD-10-CM: R07.89  ICD-9-CM: 786.59  5/22/2015 - 8/23/2015        RESOLVED: Anemia ICD-10-CM: D64.9  ICD-9-CM: 285.9  1/29/2014 - 1/9/2017        RESOLVED: Supratherapeutic INR ICD-10-CM: R79.1  ICD-9-CM: 790.92  1/13/2014 - 1/29/2014        RESOLVED: Melena ICD-10-CM: K92.1  ICD-9-CM: 578.1  1/13/2014 - 1/29/2014        RESOLVED: Macrocytic anemia ICD-10-CM: D53.9  ICD-9-CM: 281.9  1/13/2014 - 1/29/2014        RESOLVED: GI bleed ICD-10-CM: K92.2  ICD-9-CM: 578.9  1/13/2014 - 1/29/2014        RESOLVED: Cough ICD-10-CM: R05  ICD-9-CM: 786.2  1/13/2014 - 1/29/2014        RESOLVED: Diarrhea ICD-10-CM: R19.7  ICD-9-CM: 787.91  1/13/2014 - 1/29/2014        RESOLVED: Coffee ground emesis ICD-10-CM: K92.0  ICD-9-CM: 578.0  1/13/2014 - 1/29/2014        RESOLVED: Left flank pain ICD-10-CM: R10.9  ICD-9-CM: 789.09  1/13/2014 - 1/29/2014        RESOLVED: Weakness generalized ICD-10-CM: R53.1  ICD-9-CM: 780.79  1/13/2014 - 8/23/2015        RESOLVED: Hypoxia ICD-10-CM: R09.02  ICD-9-CM: 799.02  1/13/2014 - 1/29/2014        RESOLVED: CAP (community acquired pneumonia) ICD-10-CM: J18.9  ICD-9-CM: 692  1/13/2014 - 1/29/2014        RESOLVED: Pericardial effusion ICD-10-CM: I31.3  ICD-9-CM: 423.9  1/13/2014 - 1/29/2014        RESOLVED: Elevated blood pressure reading without diagnosis of hypertension ICD-10-CM: R03.0  ICD-9-CM: 796.2  1/13/2014 - 1/29/2014        RESOLVED: Mitral regurgitation ICD-10-CM: I34.0  ICD-9-CM: 424.0  10/22/2010 - 1/9/2017              Subjective:    68 y.o.  female who presented to the Emergency Department complaining of leg swelling. She provides no hx. Family is present. I have reviewed records. A few months ago she had a CVA, and has been persistently more immobile since. She has severe dementia and other medical issues. Family noted LLE swelling yesterday, and she had mild LLE discomfort today. She is supposed to be on coumadin for Afib, but her INR is only 1.3. ER workup shows extensive LLE DVT and ARF. 1/10:  Dopplers revealed extensive DVT LLE.   Pt poorly responsive at baseline since CVA - now seems worse. Lovenox until coumadin therapeutic.     :  Little change from yesterday and remains poorly responsive. She is stable for [facility].     : More alert today. Nursing reports she was more alert yesterday and took diet well. She is stable for [facility].     : Sleeping but arousable. Case management working on placement. She is stable for d/c.    : Patient is awake and alert this morning. She looks comfortable. Able to say yes/no a few times but unreliable answers. Believes she is at home. Does not seem to be in pain. No acute events overnight. 1/15: No acute events overnight. Patient answers simple yes/no questions but unreliable. :  Little change. Alert but not oriented at all. Seems to be in no distress. Stable for [facility].      :  Little change. She said \"OK\" this AM when asked how she was doing but no other speech to questions. Stable for [facility].    :  Little change. Less verbal this AM.  Moves extrem spontaneously but not to command. Stable for [facility] or LTC.    :  Turns head to name but not verbal and does not follow commands. Stable for LTC or SCF.    :  Little change. Vital stable. Palliative saw for possible hospice admission - decision by family pending. Stable for hospice or LTC or SCF. Review of Systems:   Review of systems not obtained due to patient factors. Objective:   Physical Exam:     Visit Vitals    /71 (BP 1 Location: Left arm, BP Patient Position: At rest)    Pulse 69    Temp 97.7 °F (36.5 °C)    Resp 16    Ht 5' 4\" (1.626 m)    Wt 61.7 kg (136 lb)    SpO2 95%    Breastfeeding No    BMI 23.34 kg/m2      O2 Device: Room air    Temp (24hrs), Av.8 °F (36.6 °C), Min:97.4 °F (36.3 °C), Max:98.2 °F (36.8 °C)         1901 -  0700  In: -   Out: 2300 [Urine:2300]    General:   awake and alert   Head:  Normocephalic, without obvious abnormality, atraumatic.    Eyes: Conjunctivae/corneas clear. EOMs intact. Nose: Nares normal. Septum midline. Mucosa normal. No drainage or sinus tenderness. Throat: Lips, mucosa, and tongue moist..   Neck: Supple, symmetrical, trachea midline   Lungs:   Ctab, no w/r/r   Heart:  Regular rate and rhythm, S1, S2 normal, 2/6 murmur loudest axilla, apex and LSB. No click, rub or gallop. Abdomen:   Soft, apparently non-tender. Bowel sounds normal. No masses,  No organomegaly. Extremities: no cyanosis or RLE edema; LLE with trace to 1+ edema. Skin: Skin color, texture, turgor normal. No rashes or lesions   Neurologic:  Alert and oriented X 0. Moves limbs spontaneously. Does not follow commands. Will eat when fed. Data Review:     Jong GRIMM Dopplers 1/9/17:  CONCLUSION: Left lower extremity venous duplex positive for acute deep  venous thrombosis and thrombophlebitis throughout the left leg from  external iliac vein down through the calf. Right common femoral vein  is thrombus free.     Recent Days:  Recent Labs      01/19/17   0451   WBC  7.3   HGB  10.4*   HCT  32.4*   PLT  299     Recent Labs      01/20/17   0309  01/19/17   0604  01/19/17   0451  01/18/17   0621   NA   --    --   140   --    K   --    --   4.3   --    CL   --    --   105   --    CO2   --    --   30   --    GLU   --    --   100   --    BUN   --    --   16   --    CREA   --    --   0.87   --    CA   --    --   8.6   --    ALB   --    --   2.2*   --    TBILI   --    --   0.4   --    SGOT   --    --   25   --    ALT   --    --   29   --    INR  2.8*  2.3*   --   2.2*     No results for input(s): PH, PCO2, PO2, HCO3, FIO2 in the last 72 hours.     24 Hour Results:  Recent Results (from the past 24 hour(s))   GLUCOSE, POC    Collection Time: 01/19/17 11:09 AM   Result Value Ref Range    Glucose (POC) 146 (H) 65 - 100 mg/dL    Performed by Elena Carrington Présgerard Chou, POC    Collection Time: 01/19/17  4:15 PM   Result Value Ref Range    Glucose (POC) 135 (H) 65 - 100 mg/dL Performed by Romario Candelario    GLUCOSE, POC    Collection Time: 01/19/17  8:50 PM   Result Value Ref Range    Glucose (POC) 154 (H) 65 - 100 mg/dL    Performed by Maria Del Carmen RANDHAWA [address] + INR    Collection Time: 01/20/17  3:09 AM   Result Value Ref Range    INR 2.8 (H) 0.9 - 1.1      Prothrombin time 29.6 (H) 9.0 - 11.1 sec   GLUCOSE, POC    Collection Time: 01/20/17  7:24 AM   Result Value Ref Range    Glucose (POC) 81 65 - 100 mg/dL    Performed by Charlee Valenzuela (PCT)        Medications reviewed  Current Facility-Administered Medications   Medication Dose Route Frequency    bisacodyl (DULCOLAX) suppository 10 mg  10 mg Rectal DAILY PRN    albuterol (ACCUNEB) nebulizer solution 1.25 mg  1.25 mg Nebulization Q4H PRN    bimatoprost (LUMIGAN) 0.01 % ophthalmic drops 1 Drop  1 Drop Right Eye QHS    brimonidine (ALPHAGAN P) 0.1 % ophthalmic solution 1 Drop  1 Drop Right Eye BID    dorzolamide-timolol (COSOPT) 22.3-6.8 mg/mL ophthalmic solution 1 Drop  1 Drop Right Eye BID    memantine ER (NAMENDA XR) capsule 28 mg  28 mg Oral DAILY    QUEtiapine (SEROquel) tablet 12.5 mg  12.5 mg Oral QHS    naloxone (NARCAN) injection 0.4 mg  0.4 mg IntraVENous PRN    0.9% sodium chloride infusion  50 mL/hr IntraVENous CONTINUOUS    acetaminophen (TYLENOL) tablet 650 mg  650 mg Oral Q4H PRN    HYDROcodone-acetaminophen (NORCO) 5-325 mg per tablet 1 Tab  1 Tab Oral Q4H PRN    diphenhydrAMINE (BENADRYL) capsule 25 mg  25 mg Oral Q4H PRN    ondansetron (ZOFRAN) injection 4 mg  4 mg IntraVENous Q4H PRN    metoprolol tartrate (LOPRESSOR) tablet 75 mg  75 mg Oral BID    disopyramide phosphate (NORPACE) capsule 100 mg  100 mg Oral BID    Warfarin- Pharmacy to dose daily   Other Rx Dosing/Monitoring       Jimmie Jarrell MD

## 2017-01-20 NOTE — ROUTINE PROCESS
Bedside shift change report given to Renetta Vance RN (oncoming nurse) by Angella Mitchell. Cordelia Orourke   (offgoing nurse). Report included the following information SBAR, Kardex and MAR.

## 2017-01-20 NOTE — PROGRESS NOTES
1711:  I spoke with Terrell Cuello and explained the process to her (see previous note). If the pt discharges over the weekend, call Sameer Hakeem at [phone] and ask for the on call person. They are setting up equipment at home tonight and tomorrow morning. DONYA Lorenzana, [address] [place] [address]:  A list of private duty aides was given to pt's . He was instructed to have his granddaughter choose an agency and call then let me know which agency was chosen. Form 96 to be completed and signed by physician. Pt needs to be d/c'd to home to have Medicaid personal care evaluate her. ANUJA Haider    1231:  Pt accepted by [facility]. They will set up equipment today. Pt needs  personal care under Medicaid. ANUJA Haider     Note:  [facility] to meet with family between 11-12 today.   ANUJA Haider

## 2017-01-20 NOTE — PROGRESS NOTES
Patient's BP's [id number] and rechecked 99/57. Called MD to determine if we should hold the metoprolol and norpace at this time. A telephone order with readback was received to hold the metoprolol and norpace. Bedside and Verbal shift change report given to Jonathan Oseguera RN (oncoming nurse) by Nirmal Dhillon RN (offgoing nurse). Report included the following information SBAR, Kardex, Intake/Output, MAR, Accordion and Recent Results.

## 2017-01-21 LAB
INR PPP: 3.8 (ref 0.9–1.1)
PROTHROMBIN TIME: 39.5 SEC (ref 9–11.1)

## 2017-01-21 PROCEDURE — 85610 PROTHROMBIN TIME: CPT | Performed by: INTERNAL MEDICINE

## 2017-01-21 PROCEDURE — 74011250637 HC RX REV CODE- 250/637: Performed by: INTERNAL MEDICINE

## 2017-01-21 PROCEDURE — 36415 COLL VENOUS BLD VENIPUNCTURE: CPT | Performed by: INTERNAL MEDICINE

## 2017-01-21 PROCEDURE — 65660000000 HC RM CCU STEPDOWN

## 2017-01-21 RX ADMIN — BRIMONIDINE TARTRATE 1 DROP: 1 SOLUTION/ DROPS OPHTHALMIC at 11:02

## 2017-01-21 RX ADMIN — METOPROLOL TARTRATE 75 MG: 25 TABLET ORAL at 10:47

## 2017-01-21 RX ADMIN — METOPROLOL TARTRATE 75 MG: 25 TABLET ORAL at 22:14

## 2017-01-21 RX ADMIN — DISOPYRAMIDE PHOSPHATE 100 MG: 100 CAPSULE ORAL at 22:14

## 2017-01-21 RX ADMIN — MEMANTINE HYDROCHLORIDE 28 MG: 28 CAPSULE, EXTENDED RELEASE ORAL at 10:47

## 2017-01-21 RX ADMIN — DORZOLAMIDE HYDROCHLORIDE AND TIMOLOL MALEATE 1 DROP: 20; 5 SOLUTION/ DROPS OPHTHALMIC at 11:02

## 2017-01-21 RX ADMIN — BIMATOPROST 1 DROP: 0.1 SOLUTION/ DROPS OPHTHALMIC at 22:14

## 2017-01-21 RX ADMIN — QUETIAPINE FUMARATE 12.5 MG: 25 TABLET, FILM COATED ORAL at 12:05

## 2017-01-21 RX ADMIN — BRIMONIDINE TARTRATE 1 DROP: 1 SOLUTION/ DROPS OPHTHALMIC at 22:14

## 2017-01-21 RX ADMIN — DISOPYRAMIDE PHOSPHATE 100 MG: 100 CAPSULE ORAL at 10:47

## 2017-01-21 RX ADMIN — DORZOLAMIDE HYDROCHLORIDE AND TIMOLOL MALEATE 1 DROP: 20; 5 SOLUTION/ DROPS OPHTHALMIC at 22:14

## 2017-01-21 NOTE — PROGRESS NOTES
[facility] Dosing Services: 1/21/2017    Consult for Warfarin Dosing by Dr. Sherrie Quinn  Indication: Venous Thrombosis. Therapy continued from home: 5mg Mon/Tues/Wed/Thur/Fri; 6mg on Sat/Sun   Dose to achieve an INR goal of 2-3    No Warfarin tonight. Significant drug interactions: none  Previous dose given 3 mg   PT/INR Lab Results   Component Value Date/Time    INR 3.8 01/21/2017 04:48 AM    INR (POC) 1.0 10/24/2016 06:33 PM    INR POC 2.2 05/28/2015 01:01 PM      Platelets Lab Results   Component Value Date/Time    PLATELET 666 24/00/9839 04:51 AM      H/H Lab Results   Component Value Date/Time    HGB 10.4 01/19/2017 04:51 AM        Pharmacy to follow daily and will provide subsequent Warfarin dosing based on clinical status.   Robin Yo, [address])  Contact information [phone]

## 2017-01-21 NOTE — PROGRESS NOTES
1/21/2017 2:37 PM Planning for pt to discharge with home with [facility] on 1/22. Pt will need ambulance transport at discharge, please contact LINDA([phone]) to arrange. 1/21/2017 2:32 PM Spoke with Jen Ramirez with [facility]([phone]). Pt's DME will not be delivered to pt's home until tonight. Planning for pt to discharge tomorrow. DAYANARA relayed this to pt's granddaughter, VIELKA([phone]) who was in agreement for discharge tomorrow. 1/21/2017  1:42 PM Spoke with [facility] RN. They can accept pt today if discharged. DAYANARA spoke with pt's granddaughter, pt's equipment has not been delivered to pt's home. Pt's granddaughter reported the ordered DME is a hospital bed, bed side table and O2.  Pt's granddaughter requested ambulance transport for pt at discharge. DAYANARA spoke with Hospice RN again who is following up regarding pt's DME. CM will follow up.     1/21/2017 12:47 PM No response for [facility]. DAYANARA called [facility] and requested for oncall RN to be paged again. DAYANARA will follow up.      1/21/2017  10:37 AM Call made to answering service for [facility]. DAYANARA relayed pt is ready for discharge today. DAYANARA will follow up.  ADAMS Bowser

## 2017-01-21 NOTE — PROGRESS NOTES
Daily Progress Note: 1/21/2017  Rosi Jimenez MD    Assessment/Plan:   Deep venous thrombosis - New acute and extensive. --pharmacy dosing coumadin       ARF (acute renal failure) / Dehydration - POA, likely due to poor PO intake. Hydrate. Hold PO K and hold Bumex  --Cr back to baseline, K normal     Hypertrophic cardiomyopathy / Chronic diastolic heart failure -no exacerbation  -Hold bumex, continue metoprolol, home norpace     Dementia / Cerebral atrophy - Severe. Continue seroquel and memantine.     Paroxysmal atrial fibrillation / CAD (coronary artery disease) - No acute symptoms. Continue BB. Not on ASA. Hold statin.     Glaucoma - Continue lumigan, alphagan and cospot     Hyperlipidemia- Given age and co morbidities, I would not test or treat hyperlipidemia     Weakness due to cerebrovascular accident - Fall precautions. Pureed and nector diet.  She is at baseline, making no improvement since stroke.       No UTI - Colonization with VRE   - repeat urine culture with mult bacteria - likely colonization given no symptoms, no fever           Problem List:  Problem List as of 1/21/2017  Date Reviewed: 1/9/2017          Codes Class Noted - Resolved    Deep venous thrombosis ([facility] ICD-10-CM: I82.409  ICD-9-CM: 453.40  1/9/2017 - Present        CAD (coronary artery disease) ICD-10-CM: I25.10  ICD-9-CM: 414.00  Unknown - Present        Dehydration ICD-10-CM: E86.0  ICD-9-CM: 276.51  1/9/2017 - Present        ARF (acute renal failure) ([facility] ICD-10-CM: N17.9  ICD-9-CM: 584.9  1/9/2017 - Present        Weakness due to cerebrovascular accident (CVA) ([facility] ICD-10-CM: I63.9, R53.1  ICD-9-CM: 434.91, 780.79  10/26/2016 - Present        Goals of care, counseling/discussion ICD-10-CM: Z71.89  ICD-9-CM: V65.49  10/26/2016 - Present        Hyperlipidemia LDL goal <70 ICD-10-CM: E78.5  ICD-9-CM: 272.4  10/25/2016 - Present        Cerebral atrophy ICD-10-CM: G31.9  ICD-9-CM: 331.9  10/25/2016 - Present Dementia ICD-10-CM: F03.90  ICD-9-CM: 294.20  10/25/2016 - Present        Glaucoma ICD-10-CM: H40.9  ICD-9-CM: 365.9  1/13/2014 - Present        Hypertrophic cardiomyopathy ([facility] ICD-10-CM: I42.2  ICD-9-CM: 425.18  6/1/2012 - Present        Paroxysmal atrial fibrillation ([facility] ICD-10-CM: I48.0  ICD-9-CM: 427.31  11/29/2011 - Present        Chronic diastolic heart failure (HCC) ICD-10-CM: I50.32  ICD-9-CM: 428.32  2/26/2011 - Present        RESOLVED: Carotid occlusion, left ICD-10-CM: I65.22  ICD-9-CM: 433.10  10/25/2016 - 1/9/2017        RESOLVED: Acute CVA (cerebrovascular accident) ([facility] ICD-10-CM: I63.9  ICD-9-CM: 434.91  10/24/2016 - 1/9/2017        RESOLVED: Orthostatic hypotension ICD-10-CM: I95.1  ICD-9-CM: 458.0  10/28/2015 - 1/9/2017        RESOLVED: Acute embolic stroke ([facility] ZCM-80-SZ: I63.9  ICD-9-CM: 434.11  6/26/2015 - 1/9/2017        RESOLVED: Dyslipidemia ICD-10-CM: E78.5  ICD-9-CM: 272.4  6/25/2015 - 1/9/2017        RESOLVED: Pulmonary nodule ICD-10-CM: R91.1  ICD-9-CM: 793.11  6/25/2015 - 1/9/2017        RESOLVED: Receptive aphasia ICD-10-CM: R47.01  ICD-9-CM: 784.3  6/25/2015 - 8/23/2015        RESOLVED: Dysarthria ICD-10-CM: R47.1  ICD-9-CM: 784.51  6/25/2015 - 8/23/2015        RESOLVED: Expressive aphasia ICD-10-CM: R47.01  ICD-9-CM: 784.3  6/25/2015 - 8/23/2015        RESOLVED: ADHF (acute decompensated heart failure) ([facility] ICD-10-CM: I50.9  ICD-9-CM: 428.0  6/24/2015 - 8/23/2015        RESOLVED: Cerebral ventriculomegaly due to brain atrophy ICD-10-CM: G31.9  ICD-9-CM: 331.9  6/24/2015 - 8/23/2015        RESOLVED: Left-sided visual neglect ICD-10-CM: R41.4  ICD-9-CM: 781.8  6/24/2015 - 8/23/2015        RESOLVED: Chest pain ICD-10-CM: R07.9  ICD-9-CM: 786.50  6/23/2015 - 8/23/2015        RESOLVED: RYAN (dyspnea on exertion) ICD-10-CM: R06.09  ICD-9-CM: 786.09  5/22/2015 - 8/23/2015        RESOLVED: Chest tightness ICD-10-CM: R07.89  ICD-9-CM: 786.59  5/22/2015 - 8/23/2015        RESOLVED: Anemia ICD-10-CM: D64.9  ICD-9-CM: 285.9  1/29/2014 - 1/9/2017        RESOLVED: Supratherapeutic INR ICD-10-CM: R79.1  ICD-9-CM: 790.92  1/13/2014 - 1/29/2014        RESOLVED: Melena ICD-10-CM: K92.1  ICD-9-CM: 578.1  1/13/2014 - 1/29/2014        RESOLVED: Macrocytic anemia ICD-10-CM: D53.9  ICD-9-CM: 281.9  1/13/2014 - 1/29/2014        RESOLVED: GI bleed ICD-10-CM: K92.2  ICD-9-CM: 578.9  1/13/2014 - 1/29/2014        RESOLVED: Cough ICD-10-CM: R05  ICD-9-CM: 786.2  1/13/2014 - 1/29/2014        RESOLVED: Diarrhea ICD-10-CM: R19.7  ICD-9-CM: 787.91  1/13/2014 - 1/29/2014        RESOLVED: Coffee ground emesis ICD-10-CM: K92.0  ICD-9-CM: 578.0  1/13/2014 - 1/29/2014        RESOLVED: Left flank pain ICD-10-CM: R10.9  ICD-9-CM: 789.09  1/13/2014 - 1/29/2014        RESOLVED: Weakness generalized ICD-10-CM: R53.1  ICD-9-CM: 780.79  1/13/2014 - 8/23/2015        RESOLVED: Hypoxia ICD-10-CM: R09.02  ICD-9-CM: 799.02  1/13/2014 - 1/29/2014        RESOLVED: CAP (community acquired pneumonia) ICD-10-CM: J18.9  ICD-9-CM: 489  1/13/2014 - 1/29/2014        RESOLVED: Pericardial effusion ICD-10-CM: I31.3  ICD-9-CM: 423.9  1/13/2014 - 1/29/2014        RESOLVED: Elevated blood pressure reading without diagnosis of hypertension ICD-10-CM: R03.0  ICD-9-CM: 796.2  1/13/2014 - 1/29/2014        RESOLVED: Mitral regurgitation ICD-10-CM: I34.0  ICD-9-CM: 424.0  10/22/2010 - 1/9/2017              Subjective:    68 y.o.  female who presented to the Emergency Department complaining of leg swelling. She provides no hx. Family is present. I have reviewed records. A few months ago she had a CVA, and has been persistently more immobile since. She has severe dementia and other medical issues. Family noted LLE swelling yesterday, and she had mild LLE discomfort today. She is supposed to be on coumadin for Afib, but her INR is only 1.3. ER workup shows extensive LLE DVT and ARF. 1/10:  Dopplers revealed extensive DVT LLE.   Pt poorly responsive at baseline since CVA - now seems worse. Lovenox until coumadin therapeutic.     :  Little change from yesterday and remains poorly responsive. She is stable for [facility].     : More alert today. Nursing reports she was more alert yesterday and took diet well. She is stable for [facility].     : Sleeping but arousable. Case management working on placement. She is stable for d/c.    : Patient is awake and alert this morning. She looks comfortable. Able to say yes/no a few times but unreliable answers. Believes she is at home. Does not seem to be in pain. No acute events overnight. 1/15: No acute events overnight. Patient answers simple yes/no questions but unreliable. :  Little change. Alert but not oriented at all. Seems to be in no distress. Stable for [facility].      :  Little change. She said \"OK\" this AM when asked how she was doing but no other speech to questions. Stable for [facility].    :  Little change. Less verbal this AM.  Moves extrem spontaneously but not to command. Stable for [facility] or LTC.    :  Turns head to name but not verbal and does not follow commands. Stable for LTC or [facility].    :  Little change. Vital stable. Palliative saw for possible hospice admission - decision by family pending. Stable for hospice or LTC or SCF.    :  Little change. Stable for hospice/LTC of [facility]. Waiting to hear which is chosen and when she can be discharged. Review of Systems:   Review of systems not obtained due to patient factors.     Objective:   Physical Exam:     Visit Vitals    /71 (BP 1 Location: Left arm, BP Patient Position: At rest)    Pulse 66    Temp 98.5 °F (36.9 °C)    Resp 18    Ht 5' 4\" (1.626 m)    Wt 61.7 kg (136 lb)    SpO2 95%    Breastfeeding No    BMI 23.34 kg/m2      O2 Device: Room air    Temp (24hrs), Av.2 °F (36.8 °C), Min:97.4 °F (36.3 °C), Max:99 °F (37.2 °C)        1901 -  0700  In: 240 [P.O.:240]  Out: 900 [[facility]:219]    General:   awake and alert   Head:  Normocephalic, without obvious abnormality, atraumatic. Eyes:  Conjunctivae/corneas clear. EOMs intact. Nose: Nares normal. Septum midline. Mucosa normal. No drainage or sinus tenderness. Throat: Lips, mucosa, and tongue moist..   Neck: Supple, symmetrical, trachea midline   Lungs:   Ctab, no w/r/r   Heart:  Regular rate and rhythm, S1, S2 normal, 2/6 murmur loudest axilla, apex and LSB. No click, rub or gallop. Abdomen:   Soft, apparently non-tender. Bowel sounds normal. No masses,  No organomegaly. Extremities: no cyanosis or RLE edema; LLE with trace to 1+ edema. Skin: Skin color, texture, turgor normal. No rashes or lesions   Neurologic:  Alert and oriented X 0. Moves limbs spontaneously. Does not follow commands. Will eat when fed. Data Review:     oJng GRIMM Dopplers 17:  CONCLUSION: Left lower extremity venous duplex positive for acute deep  venous thrombosis and thrombophlebitis throughout the left leg from  external iliac vein down through the calf. Right common femoral vein  is thrombus free.     Recent Days:  Recent Labs      17   0451   WBC  7.3   HGB  10.4*   HCT  32.4*   PLT  299     Recent Labs      17   0448  17   0309  17   0604  17   0451   NA   --    --    --   140   K   --    --    --   4.3   CL   --    --    --   105   CO2   --    --    --   30   GLU   --    --    --   100   BUN   --    --    --   16   CREA   --    --    --   0.87   CA   --    --    --   8.6   ALB   --    --    --   2.2*   TBILI   --    --    --   0.4   SGOT   --    --    --   25   ALT   --    --    --   29   INR  3.8*  2.8*  2.3*   --      No results for input(s): PH, PCO2, PO2, HCO3, FIO2 in the last 72 hours.     24 Hour Results:  Recent Results (from the past 24 hour(s))   GLUCOSE, POC    Collection Time: 17  7:24 AM   Result Value Ref Range    Glucose (POC) 81 65 - 100 mg/dL    Performed by Charlee Valenzuela (PCT) GLUCOSE, POC    Collection Time: 01/20/17 11:33 AM   Result Value Ref Range    Glucose (POC) 166 (H) 65 - 100 mg/dL    Performed by Juma Seaman (PCT)    GLUCOSE, POC    Collection Time: 01/20/17  4:38 PM   Result Value Ref Range    Glucose (POC) 111 (H) 65 - 100 mg/dL    Performed by Charlee Valenzuela (PCT)    GLUCOSE, POC    Collection Time: 01/20/17  9:50 PM   Result Value Ref Range    Glucose (POC) 134 (H) 65 - 100 mg/dL    Performed by Walt Elliott (PCT)    PROTHROMBIN TIME + INR    Collection Time: 01/21/17  4:48 AM   Result Value Ref Range    INR 3.8 (H) 0.9 - 1.1      Prothrombin time 39.5 (H) 9.0 - 11.1 sec       Medications reviewed  Current Facility-Administered Medications   Medication Dose Route Frequency    bisacodyl (DULCOLAX) suppository 10 mg  10 mg Rectal DAILY PRN    albuterol (ACCUNEB) nebulizer solution 1.25 mg  1.25 mg Nebulization Q4H PRN    bimatoprost (LUMIGAN) 0.01 % ophthalmic drops 1 Drop  1 Drop Right Eye QHS    brimonidine (ALPHAGAN P) 0.1 % ophthalmic solution 1 Drop  1 Drop Right Eye BID    dorzolamide-timolol (COSOPT) 22.3-6.8 mg/mL ophthalmic solution 1 Drop  1 Drop Right Eye BID    memantine ER (NAMENDA XR) capsule 28 mg  28 mg Oral DAILY    QUEtiapine (SEROquel) tablet 12.5 mg  12.5 mg Oral QHS    naloxone (NARCAN) injection 0.4 mg  0.4 mg IntraVENous PRN    0.9% sodium chloride infusion  50 mL/hr IntraVENous CONTINUOUS    acetaminophen (TYLENOL) tablet 650 mg  650 mg Oral Q4H PRN    HYDROcodone-acetaminophen (NORCO) 5-325 mg per tablet 1 Tab  1 Tab Oral Q4H PRN    diphenhydrAMINE (BENADRYL) capsule 25 mg  25 mg Oral Q4H PRN    ondansetron (ZOFRAN) injection 4 mg  4 mg IntraVENous Q4H PRN    metoprolol tartrate (LOPRESSOR) tablet 75 mg  75 mg Oral BID    disopyramide phosphate (NORPACE) capsule 100 mg  100 mg Oral BID    Warfarin- Pharmacy to dose daily   Other Rx Dosing/Monitoring       Catarina Amado MD

## 2017-01-21 NOTE — ROUTINE PROCESS
Bedside and Verbal shift change report given to Enrico Yun RN (oncoming nurse) by Rajwinder Garcia RN (offgoing nurse). Report included the following information SBAR, Kardex, MAR, Accordion and Recent Results.

## 2017-01-21 NOTE — ROUTINE PROCESS
Bedside shift change report given to Kari Gross RN (oncoming nurse) by Sara Gongora. Tristin Osei   (offgoing nurse). Report included the following information SBAR, Kardex and MAR.

## 2017-01-22 VITALS
WEIGHT: 136 LBS | SYSTOLIC BLOOD PRESSURE: 104 MMHG | DIASTOLIC BLOOD PRESSURE: 57 MMHG | HEART RATE: 62 BPM | OXYGEN SATURATION: 93 % | BODY MASS INDEX: 23.22 KG/M2 | HEIGHT: 64 IN | RESPIRATION RATE: 18 BRPM | TEMPERATURE: 98.1 F

## 2017-01-22 DIAGNOSIS — I48.0 PAROXYSMAL ATRIAL FIBRILLATION (HCC): ICD-10-CM

## 2017-01-22 LAB
INR PPP: 2.4 (ref 0.9–1.1)
PROTHROMBIN TIME: 24.5 SEC (ref 9–11.1)

## 2017-01-22 PROCEDURE — 74011250637 HC RX REV CODE- 250/637: Performed by: INTERNAL MEDICINE

## 2017-01-22 PROCEDURE — 85610 PROTHROMBIN TIME: CPT | Performed by: INTERNAL MEDICINE

## 2017-01-22 PROCEDURE — 36415 COLL VENOUS BLD VENIPUNCTURE: CPT | Performed by: INTERNAL MEDICINE

## 2017-01-22 RX ORDER — QUETIAPINE FUMARATE 25 MG/1
12.5 TABLET, FILM COATED ORAL
Qty: 30 TAB | Refills: 0 | Status: SHIPPED
Start: 2017-01-22 | End: 2019-04-28

## 2017-01-22 RX ADMIN — DORZOLAMIDE HYDROCHLORIDE AND TIMOLOL MALEATE 1 DROP: 20; 5 SOLUTION/ DROPS OPHTHALMIC at 09:49

## 2017-01-22 RX ADMIN — DISOPYRAMIDE PHOSPHATE 100 MG: 100 CAPSULE ORAL at 09:47

## 2017-01-22 RX ADMIN — BRIMONIDINE TARTRATE 1 DROP: 1 SOLUTION/ DROPS OPHTHALMIC at 09:49

## 2017-01-22 RX ADMIN — MEMANTINE HYDROCHLORIDE 28 MG: 28 CAPSULE, EXTENDED RELEASE ORAL at 09:47

## 2017-01-22 NOTE — ROUTINE PROCESS
Bedside and Verbal shift change report given to Edgardo Ivan RN (oncoming nurse) by Elly Frankel RN (offgoing nurse). Report included the following information SBAR, Kardex, MAR, Accordion and Recent Results.

## 2017-01-22 NOTE — PROGRESS NOTES
1/22/2017 9:14 AM Ambulance transport is arranged for 1PM today to transport pt home. Spoke with Robin Gant at [facility] to arrange. Pt's granddaughter and RN are aware of transport time. Ambulance packet completed and sent to 5th floor. Pt's discharge summary and avs were faxed to [place] [phone] at [phone] . Called and notified Worthy Hashimoto of pt's transport time. 1/22/2017  9:02 AM Pt's H&P and hospice order were faxed to [place] [phone] at [phone] . Spoke with Worthy Hashimoto at [place] [phone] who confirmed DME has been delivered and they will admit pt at home. Spoke with pt's granddaughter, Cristal Mcburney who is agreeable to discharge today.

## 2017-01-22 NOTE — PROGRESS NOTES
[facility] Dosing Services: 1/22/2017    Consult for Warfarin Dosing by Dr. Jocelyn Rao  Indication: Venous Thrombosis. Therapy continued from home: 5mg Mon/Tues/Wed/Thur/Fri; 6mg on Sat/Sun   Dose to achieve an INR goal of 2-3    Order entered for  Warfarin 3 mg to be given today at 18:00. Significant drug interactions: none  Previous dose given Held on 1/21/17   PT/INR Lab Results   Component Value Date/Time    INR 2.4 01/22/2017 04:42 AM    INR (POC) 1.0 10/24/2016 06:33 PM    INR POC 2.2 05/28/2015 01:01 PM      Platelets Lab Results   Component Value Date/Time    PLATELET 555 52/55/8749 04:51 AM      H/H Lab Results   Component Value Date/Time    HGB 10.4 01/19/2017 04:51 AM        Pharmacy to follow daily and will provide subsequent Warfarin dosing based on clinical status.   Socrates Moncada, [place])  Contact information [phone]

## 2017-01-22 NOTE — DISCHARGE SUMMARY
Physician Discharge Summary     Patient ID:    Jaylene Soriano  [id number]  68 y.o.  1940  Divina Messer MD    Admit date: 1/9/2017    Discharge date and time: 1/22/2017    Admission Diagnoses: Deep venous thrombosis ([email or web address]    Chronic Diagnoses:    Problem List as of 1/22/2017  Date Reviewed: 1/9/2017          Codes Class Noted - Resolved    Deep venous thrombosis ([email or web address] ICD-10-CM: I82.409  ICD-9-CM: 453.40  1/9/2017 - Present        CAD (coronary artery disease) ICD-10-CM: I25.10  ICD-9-CM: 414.00  Unknown - Present        Dehydration ICD-10-CM: E86.0  ICD-9-CM: 276.51  1/9/2017 - Present        ARF (acute renal failure) ([email or web address].) ICD-10-CM: N17.9  ICD-9-CM: 584.9  1/9/2017 - Present        Weakness due to cerebrovascular accident (CVA) ([email or web address] ICD-10-CM: I63.9, R53.1  ICD-9-CM: 434.91, 780.79  10/26/2016 - Present        Goals of care, counseling/discussion ICD-10-CM: Z71.89  ICD-9-CM: V65.49  10/26/2016 - Present        Hyperlipidemia LDL goal <70 ICD-10-CM: E78.5  ICD-9-CM: 272.4  10/25/2016 - Present        Cerebral atrophy ICD-10-CM: G31.9  ICD-9-CM: 331.9  10/25/2016 - Present        Dementia ICD-10-CM: F03.90  ICD-9-CM: 294.20  10/25/2016 - Present        Glaucoma ICD-10-CM: H40.9  ICD-9-CM: 365.9  1/13/2014 - Present        Hypertrophic cardiomyopathy ([email or web address] ICD-10-CM: I42.2  ICD-9-CM: 425.18  6/1/2012 - Present        Paroxysmal atrial fibrillation ([email or web address] ICD-10-CM: I48.0  ICD-9-CM: 427.31  11/29/2011 - Present        Chronic diastolic heart failure ([email or web address] ICD-10-CM: I50.32  ICD-9-CM: 428.32  2/26/2011 - Present        RESOLVED: Carotid occlusion, left ICD-10-CM: I65.22  ICD-9-CM: 433.10  10/25/2016 - 1/9/2017        RESOLVED: Acute CVA (cerebrovascular accident) ([email or web address] ICD-10-CM: I63.9  ICD-9-CM: 434.91  10/24/2016 - 1/9/2017        RESOLVED: Orthostatic hypotension ICD-10-CM: I95.1  ICD-9-CM: 458.0  10/28/2015 - 1/9/2017        RESOLVED: Acute embolic stroke ([email or web address] CKK-96-LB: I63.9  ICD-9-CM: 434.11  6/26/2015 - 1/9/2017        RESOLVED: Dyslipidemia ICD-10-CM: E78.5  ICD-9-CM: 272.4  6/25/2015 - 1/9/2017        RESOLVED: Pulmonary nodule ICD-10-CM: R91.1  ICD-9-CM: 793.11  6/25/2015 - 1/9/2017        RESOLVED: Receptive aphasia ICD-10-CM: R47.01  ICD-9-CM: 784.3  6/25/2015 - 8/23/2015        RESOLVED: Dysarthria ICD-10-CM: R47.1  ICD-9-CM: 784.51  6/25/2015 - 8/23/2015        RESOLVED: Expressive aphasia ICD-10-CM: R47.01  ICD-9-CM: 784.3  6/25/2015 - 8/23/2015        RESOLVED: ADHF (acute decompensated heart failure) ([facility] ICD-10-CM: I50.9  ICD-9-CM: 428.0  6/24/2015 - 8/23/2015        RESOLVED: Cerebral ventriculomegaly due to brain atrophy ICD-10-CM: G31.9  ICD-9-CM: 331.9  6/24/2015 - 8/23/2015        RESOLVED: Left-sided visual neglect ICD-10-CM: R41.4  ICD-9-CM: 781.8  6/24/2015 - 8/23/2015        RESOLVED: Chest pain ICD-10-CM: R07.9  ICD-9-CM: 786.50  6/23/2015 - 8/23/2015        RESOLVED: RYAN (dyspnea on exertion) ICD-10-CM: R06.09  ICD-9-CM: 786.09  5/22/2015 - 8/23/2015        RESOLVED: Chest tightness ICD-10-CM: R07.89  ICD-9-CM: 786.59  5/22/2015 - 8/23/2015        RESOLVED: Anemia ICD-10-CM: D64.9  ICD-9-CM: 285.9  1/29/2014 - 1/9/2017        RESOLVED: Supratherapeutic INR ICD-10-CM: R79.1  ICD-9-CM: 790.92  1/13/2014 - 1/29/2014        RESOLVED: Melena ICD-10-CM: K92.1  ICD-9-CM: 578.1  1/13/2014 - 1/29/2014        RESOLVED: Macrocytic anemia ICD-10-CM: D53.9  ICD-9-CM: 281.9  1/13/2014 - 1/29/2014        RESOLVED: GI bleed ICD-10-CM: K92.2  ICD-9-CM: 578.9  1/13/2014 - 1/29/2014        RESOLVED: Cough ICD-10-CM: R05  ICD-9-CM: 786.2  1/13/2014 - 1/29/2014        RESOLVED: Diarrhea ICD-10-CM: R19.7  ICD-9-CM: 787.91  1/13/2014 - 1/29/2014        RESOLVED: Coffee ground emesis ICD-10-CM: K92.0  ICD-9-CM: 578.0  1/13/2014 - 1/29/2014        RESOLVED: Left flank pain ICD-10-CM: R10.9  ICD-9-CM: 789.09  1/13/2014 - 1/29/2014        RESOLVED: Weakness generalized ICD-10-CM: R53.1  ICD-9-CM: 780.79  1/13/2014 - 8/23/2015        RESOLVED: Hypoxia ICD-10-CM: R09.02  ICD-9-CM: 799.02  1/13/2014 - 1/29/2014        RESOLVED: CAP (community acquired pneumonia) ICD-10-CM: J18.9  ICD-9-CM: 486  1/13/2014 - 1/29/2014        RESOLVED: Pericardial effusion ICD-10-CM: I31.3  ICD-9-CM: 423.9  1/13/2014 - 1/29/2014        RESOLVED: Elevated blood pressure reading without diagnosis of hypertension ICD-10-CM: R03.0  ICD-9-CM: 796.2  1/13/2014 - 1/29/2014        RESOLVED: Mitral regurgitation ICD-10-CM: I34.0  ICD-9-CM: 424.0  10/22/2010 - 1/9/2017              Discharge Medications:   Current Discharge Medication List      CONTINUE these medications which have CHANGED    Details   QUEtiapine (SEROQUEL) 25 mg tablet Take 0.5 Tabs by mouth nightly. Indications: post CVA and dementia behavioral disorder  Qty: 30 Tab, Refills: 0         CONTINUE these medications which have NOT CHANGED    Details   !! warfarin (COUMADIN) 6 mg tablet Take 6 mg by mouth two (2) times a week. Saturday, Sunday at 5pm      potassium chloride (K-DUR, KLOR-CON) 20 mEq tablet Take 20 mEq by mouth daily. brimonidine (ALPHAGAN P) 0.1 % ophthalmic solution Administer 1 Drop to right eye two (2) times a day. !! warfarin (COUMADIN) 4 mg tablet Take 5 mg by mouth five (5) days a week. Monday, Tuesday, Wednesday, Thursday, Friday at 5pm      bumetanide (BUMEX) 0.5 mg tablet Take 0.5 mg by mouth three (3) days a week. Patient takes on Monday, Wednesday, Friday      bimatoprost (LUMIGAN) 0.01 % ophthalmic drops Administer 1 Drop to right eye nightly. memantine ER (NAMENDA XR) 28 mg capsule Take 1 Cap by mouth daily.   Qty: 30 Cap, Refills: 3    Associated Diagnoses: Late onset Alzheimer's disease without behavioral disturbance      disopyramide phosphate (NORPACE) 100 mg capsule take 1 capsule by mouth twice a day  Qty: 60 Cap, Refills: 3    Associated Diagnoses: Paroxysmal atrial fibrillation (HCC)      metoprolol tartrate (LOPRESSOR) 25 mg tablet take 3 tablets by mouth twice a day  Qty: 180 Tab, Refills: 5      dorzolamide-timolol (COSOPT) 2-0.5 % ophthalmic solution Administer 1 Drop to right eye two (2) times a day. !! - Potential duplicate medications found. Please discuss with provider. STOP taking these medications       cephALEXin (KEFLEX) 500 mg capsule Comments:   Reason for Stopping:         atorvastatin (LIPITOR) 40 mg tablet Comments:   Reason for Stopping:         brimonidine (ALPHAGAN) 0.2 % ophthalmic solution Comments:   Reason for Stopping: Follow up Care:    1. Jett Montoya MD with in 1 weeks  2.  specialists as directed. Diet:  Cardiac Diet    Disposition:  Home hospice    Advanced Directive:    Discharge Exam:  [See today's progress note.]    CONSULTATIONS: Neurology    Significant Diagnostic Studies:   No results for input(s): WBC, HGB, HCT, PLT, HGBEXT, HCTEXT, PLTEXT in the last 72 hours. No results for input(s): NA, K, CL, CO2, BUN, CREA, GLU, CA, MG, PHOS, URICA in the last 72 hours. No results for input(s): SGOT, GPT, ALT, AP, TBIL, TBILI, TP, ALB, GLOB, GGT, AML, LPSE in the last 72 hours. No lab exists for component: AMYP, HLPSE  Recent Labs      01/22/17   0442  01/21/17   0448  01/20/17   0309   INR  2.4*  3.8*  2.8*   PTP  24.5*  39.5*  29.6*      No results for input(s): FE, TIBC, PSAT, FERR in the last 72 hours. No results for input(s): PH, PCO2, PO2 in the last 72 hours. No results for input(s): CPK, CKMB in the last 72 hours.     No lab exists for component: TROPONINI  Lab Results   Component Value Date/Time    Glucose (POC) 134 01/20/2017 09:50 PM    Glucose (POC) 111 01/20/2017 04:38 PM    Glucose (POC) 166 01/20/2017 11:33 AM    Glucose (POC) 81 01/20/2017 07:24 AM    Glucose (POC) 154 01/19/2017 08:50 PM    Glucose (POC) 76 07/21/2016 08:58 PM    Glucose (POC) 108 06/22/2015 03:04 PM     Lab Results   Component Value Date/Time    TSH 2.830 07/26/2016 03:07 PM    T4, Free 1.21 07/26/2016 03:07 PM             HOSPITAL COURSE & TREATMENT RENDERED:   1. See today's progress note:    Daily Progress Note and Discharge Note: 1/22/2017  Dago Capps MD         Assessment/Plan:   Deep venous thrombosis - New acute and extensive. --pharmacy dosing coumadin       ARF (acute renal failure) / Dehydration - POA, likely due to poor PO intake. Hydrate. Hold PO K and hold Bumex  --Cr back to baseline, K normal      Hypertrophic cardiomyopathy / Chronic diastolic heart failure -no exacerbation  -Hold bumex, continue metoprolol, home norpace      Dementia / Cerebral atrophy - Severe. Continue seroquel and memantine.      Paroxysmal atrial fibrillation / CAD (coronary artery disease) - No acute symptoms. Continue BB. Not on ASA. Hold statin.      Glaucoma - Continue lumigan, alphagan and cospot      Hyperlipidemia- Given age and co morbidities, I would not test or treat hyperlipidemia      Weakness due to cerebrovascular accident - Fall precautions. Pureed and nector diet. She is at baseline, making no improvement since stroke.       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 9   1/9/2017 - Present           Weakness due to cerebrovascular accident (CVA) ([facility] ICD-10-CM: I63.9, R53.1  ICD-9-CM: 434.91, 780.79   10/26/2016 - Present           Goals of care, counseling/discussion ICD-10-CM: Z71.89  ICD-9-CM: V65.49   10/26/2016 - Present           Hyperlipidemia LDL goal <70 ICD-10-CM: E78.5  ICD-9-CM: 272.4   10/25/2016 - Present           Cerebral atrophy ICD-10-CM: G31.9  ICD-9-CM: 331. 9   10/25/2016 - Present           Dementia ICD-10-CM: F03.90  ICD-9-CM: 294.20   10/25/2016 - Present           Glaucoma ICD-10-CM: H40.9  ICD-9-CM: 365. 9   1/13/2014 - Present           Hypertrophic cardiomyopathy ([facility].) ICD-10-CM: I42.2  ICD-9-CM: 425.18   6/1/2012 - Present           Paroxysmal atrial fibrillation (HCC) ICD-10-CM: I48.0  ICD-9-CM: 427.31   11/29/2011 - Present           Chronic diastolic heart failure (HCC) ICD-10-CM: I50.32  ICD-9-CM: 428.32   2/26/2011 - Present           RESOLVED: Carotid occlusion, left ICD-10-CM: Z81.78  ICD-9-CM: 433.10   10/25/2016 - 1/9/2017           RESOLVED: Acute CVA (cerebrovascular accident) ([facility].) ICD-10-CM: I63.9  ICD-9-CM: 434.91   10/24/2016 - 1/9/2017           RESOLVED: Orthostatic hypotension ICD-10-CM: I95.1  ICD-9-CM: 458. 0   10/28/2015 - 1/9/2017           RESOLVED: Acute embolic stroke ([facility] PPN-49-SD: I63.9  ICD-9-CM: 434.11   6/26/2015 - 1/9/2017           RESOLVED: Dyslipidemia ICD-10-CM: E78.5  ICD-9-CM: 272.4   6/25/2015 - 1/9/2017           RESOLVED: Pulmonary nodule ICD-10-CM: R91.1  ICD-9-CM: 793.11   6/25/2015 - 1/9/2017           RESOLVED: Receptive aphasia ICD-10-CM: R47.01  ICD-9-CM: 750. 3   6/25/2015 - 8/23/2015           RESOLVED: Dysarthria ICD-10-CM: R47.1  ICD-9-CM: 784.51   6/25/2015 - 8/23/2015           RESOLVED: Expressive aphasia ICD-10-CM: R47.01  ICD-9-CM: 615. 3   6/25/2015 - 8/23/2015           RESOLVED: ADHF (acute decompensated heart failure) ([facility] ICD-10-CM: I50.9  ICD-9-CM: 428. 0   6/24/2015 - 8/23/2015           RESOLVED: Cerebral ventriculomegaly due to brain atrophy ICD-10-CM: G31.9  ICD-9-CM: 331. 9   6/24/2015 - 8/23/2015           RESOLVED: Left-sided visual neglect ICD-10-CM: R41.4  ICD-9-CM: 935. 8   6/24/2015 - 8/23/2015           RESOLVED: Chest pain ICD-10-CM: R07.9  ICD-9-CM: 786.50   6/23/2015 - 8/23/2015           RESOLVED: RYAN (dyspnea on exertion) ICD-10-CM: R06.09  ICD-9-CM: 786.09   5/22/2015 - 8/23/2015           RESOLVED: Chest tightness ICD-10-CM: R07.89  ICD-9-CM: 786.59   5/22/2015 - 8/23/2015           RESOLVED: Anemia ICD-10-CM: D64.9  ICD-9-CM: 312. 9   1/29/2014 - 1/9/2017           RESOLVED: Supratherapeutic INR ICD-10-CM: R79.1  ICD-9-CM: 790.92   1/13/2014 - 1/29/2014           RESOLVED: Melena ICD-10-CM: K92.1  ICD-9-CM: 450. 1   1/13/2014 - 1/29/2014           RESOLVED: Macrocytic anemia ICD-10-CM: D53.9  ICD-9-CM: 670. 9   1/13/2014 - 1/29/2014           RESOLVED: GI bleed ICD-10-CM: K92.2  ICD-9-CM: 578. 9   1/13/2014 - 1/29/2014           RESOLVED: Cough ICD-10-CM: R05  ICD-9-CM: 786.2   1/13/2014 - 1/29/2014           RESOLVED: Diarrhea ICD-10-CM: R19.7  ICD-9-CM: 787.91   1/13/2014 - 1/29/2014           RESOLVED: Coffee ground emesis ICD-10-CM: K92.0  ICD-9-CM: 578.0   1/13/2014 - 1/29/2014           RESOLVED: Left flank pain ICD-10-CM: R10.9  ICD-9-CM: 789.09   1/13/2014 - 1/29/2014           RESOLVED: Weakness generalized ICD-10-CM: R53.1  ICD-9-CM: 780.79   1/13/2014 - 8/23/2015           RESOLVED: Hypoxia ICD-10-CM: R09.02  ICD-9-CM: 799.02   1/13/2014 - 1/29/2014           RESOLVED: CAP (community acquired pneumonia) ICD-10-CM: J18.9  ICD-9-CM: 418   1/13/2014 - 1/29/2014           RESOLVED: Pericardial effusion ICD-10-CM: I31.3  ICD-9-CM: 423. 9   1/13/2014 - 1/29/2014           RESOLVED: Elevated blood pressure reading without diagnosis of hypertension ICD-10-CM: R03.0  ICD-9-CM: 796.2   1/13/2014 - 1/29/2014           RESOLVED: Mitral regurgitation ICD-10-CM: I34.0  ICD-9-CM: 424.0   10/22/2010 - 1/9/2017                  Subjective:   68 y.o.  female who presented to the Emergency Department complaining of leg swelling. She provides no hx. Family is present. I have reviewed records.  A few months ago she had a CVA, and has been persistently more immobile since. She has severe dementia and other medical issues. Family noted LLE swelling yesterday, and she had mild LLE discomfort today. She is supposed to be on coumadin for Afib, but her INR is only 1.3. ER workup shows extensive LLE DVT and ARF.     1/10: Dopplers revealed extensive DVT LLE. Pt poorly responsive at baseline since CVA - now seems worse. Lovenox until coumadin therapeutic.      1/11: Little change from yesterday and remains poorly responsive. She is stable for [facility].      1/12: More alert today. Nursing reports she was more alert yesterday and took diet well. She is stable for [facility].      1/13: Sleeping but arousable. Case management working on placement. She is stable for d/c.     1/14: Patient is awake and alert this morning. She looks comfortable. Able to say yes/no a few times but unreliable answers. Believes she is at home. Does not seem to be in pain. No acute events overnight.     1/15: No acute events overnight. Patient answers simple yes/no questions but unreliable.     1/16: Little change. Alert but not oriented at all. Seems to be in no distress. Stable for [facility].      1/17: Little change. She said \"OK\" this AM when asked how she was doing but no other speech to questions. Stable for [facility].     1/18: Little change. Less verbal this AM. Moves extrem spontaneously but not to command. Stable for [facility] or LTC.     1/19: Turns head to name but not verbal and does not follow commands. Stable for LTC or [facility].     1/20: Little change. Vital stable. Palliative saw for possible hospice admission - decision by family pending. Stable for hospice or LTC or SCF.     1/21: Little change. Stable for hospice/LTC of [facility]. Waiting to hear which is chosen and when she can be discharged.     1/22: She says a few words today but not responsive. Stable for DC to hospice. DC to hospice when they can accept her.   For now DC on previous meds and will let hospice decide which meds to keep for comfort and which ones to stop.     Review of Systems:   Review of systems not obtained due to patient factors.     Objective:   Physical Exam:           Visit Vitals    /66 (BP 1 Location: Right arm, BP Patient Position: At rest)    Pulse 61    Temp 97.7 °F (36.5 °C)    Resp 18    Ht 5' 4\" (1.626 m)    Wt 61.7 kg (136 lb)    SpO2 96%    Breastfeeding No    BMI 23.34 kg/m2      O2 Device: Room air     Temp (24hrs), Av.7 °F (36.5 °C), Min:97.6 °F (36.4 °C), Max:97.8 °F (36.6 °C)      1901 -  0700  In: -   Out: 600 [Urine:600]     General:  awake and alert   Head:  Normocephalic, without obvious abnormality, atraumatic. Eyes:  Conjunctivae/corneas clear. EOMs intact. Nose: Nares normal. Septum midline. Mucosa normal. No drainage or sinus tenderness. Throat: Lips, mucosa, and tongue moist..   Neck: Supple, symmetrical, trachea midline   Lungs:  Ctab, no w/r/r   Heart:  Regular rate and rhythm, S1, S2 normal, 2/6 murmur loudest axilla, apex and LSB. No click, rub or gallop. Abdomen:  Soft, apparently non-tender. Bowel sounds normal. No masses, No organomegaly. Extremities: no cyanosis or RLE edema; LLE with trace to 1+ edema. Skin: Skin color, texture, turgor normal. No rashes or lesions   Neurologic: Alert and oriented X 0. Moves limbs spontaneously. Does not follow commands. Will eat when fed.       Data Review:   Bilat LE Dopplers 17: CONCLUSION: Left lower extremity venous duplex positive for acute deep  venous thrombosis and thrombophlebitis throughout the left leg from  external iliac vein down through the calf.  Right common femoral vein  is thrombus free.        Signed:  Alex Robledo MD  2017  9:18 AM

## 2017-01-22 NOTE — DISCHARGE INSTRUCTIONS
Patient Discharge Instructions    Pradeep Wilson / [id number] : 1940    Admitted 2017 Discharged: 2017 9:15 AM     ACUTE DIAGNOSES:  Deep venous thrombosis ([facility]    CHRONIC MEDICAL DIAGNOSES:  Problem List as of 2017  Date Reviewed: 2017          Codes Class Noted - Resolved    Deep venous thrombosis ([facility] ICD-10-CM: I82.409  ICD-9-CM: 453.40  2017 - Present        CAD (coronary artery disease) ICD-10-CM: I25.10  ICD-9-CM: 414.00  Unknown - Present        Dehydration ICD-10-CM: E86.0  ICD-9-CM: 276.51  2017 - Present        ARF (acute renal failure) ([facility] ICD-10-CM: N17.9  ICD-9-CM: 584.9  2017 - Present        Weakness due to cerebrovascular accident (CVA) ([facility] ICD-10-CM: I63.9, R53.1  ICD-9-CM: 434.91, 780.79  10/26/2016 - Present        Goals of care, counseling/discussion ICD-10-CM: Z71.89  ICD-9-CM: V65.49  10/26/2016 - Present        Hyperlipidemia LDL goal <70 ICD-10-CM: E78.5  ICD-9-CM: 272.4  10/25/2016 - Present        Cerebral atrophy ICD-10-CM: G31.9  ICD-9-CM: 331.9  10/25/2016 - Present        Dementia ICD-10-CM: F03.90  ICD-9-CM: 294.20  10/25/2016 - Present        Glaucoma ICD-10-CM: H40.9  ICD-9-CM: 365.9  2014 - Present        Hypertrophic cardiomyopathy ([facility] ICD-10-CM: I42.2  ICD-9-CM: 425.18  2012 - Present        Paroxysmal atrial fibrillation ([facility] ICD-10-CM: I48.0  ICD-9-CM: 427.31  2011 - Present        Chronic diastolic heart failure ([facility] ICD-10-CM: I50.32  ICD-9-CM: 428.32  2011 - Present        RESOLVED: Carotid occlusion, left ICD-10-CM: I65.22  ICD-9-CM: 433.10  10/25/2016 - 2017        RESOLVED: Acute CVA (cerebrovascular accident) ([facility] ICD-10-CM: I63.9  ICD-9-CM: 434.91  10/24/2016 - 2017        RESOLVED: Orthostatic hypotension ICD-10-CM: I95.1  ICD-9-CM: 458.0  10/28/2015 - 2017        RESOLVED: Acute embolic stroke ([facility] EMQ-58-CQ: I63.9  ICD-9-CM: 434.11  2015 - 2017        RESOLVED: Dyslipidemia ICD-10-CM: E78.5  ICD-9-CM: 272.4  6/25/2015 - 1/9/2017        RESOLVED: Pulmonary nodule ICD-10-CM: R91.1  ICD-9-CM: 793.11  6/25/2015 - 1/9/2017        RESOLVED: Receptive aphasia ICD-10-CM: R47.01  ICD-9-CM: 784.3  6/25/2015 - 8/23/2015        RESOLVED: Dysarthria ICD-10-CM: R47.1  ICD-9-CM: 784.51  6/25/2015 - 8/23/2015        RESOLVED: Expressive aphasia ICD-10-CM: R47.01  ICD-9-CM: 784.3  6/25/2015 - 8/23/2015        RESOLVED: ADHF (acute decompensated heart failure) ([facility] ICD-10-CM: I50.9  ICD-9-CM: 428.0  6/24/2015 - 8/23/2015        RESOLVED: Cerebral ventriculomegaly due to brain atrophy ICD-10-CM: G31.9  ICD-9-CM: 331.9  6/24/2015 - 8/23/2015        RESOLVED: Left-sided visual neglect ICD-10-CM: R41.4  ICD-9-CM: 781.8  6/24/2015 - 8/23/2015        RESOLVED: Chest pain ICD-10-CM: R07.9  ICD-9-CM: 786.50  6/23/2015 - 8/23/2015        RESOLVED: RYAN (dyspnea on exertion) ICD-10-CM: R06.09  ICD-9-CM: 786.09  5/22/2015 - 8/23/2015        RESOLVED: Chest tightness ICD-10-CM: R07.89  ICD-9-CM: 786.59  5/22/2015 - 8/23/2015        RESOLVED: Anemia ICD-10-CM: D64.9  ICD-9-CM: 285.9  1/29/2014 - 1/9/2017        RESOLVED: Supratherapeutic INR ICD-10-CM: R79.1  ICD-9-CM: 790.92  1/13/2014 - 1/29/2014        RESOLVED: Melena ICD-10-CM: K92.1  ICD-9-CM: 578.1  1/13/2014 - 1/29/2014        RESOLVED: Macrocytic anemia ICD-10-CM: D53.9  ICD-9-CM: 281.9  1/13/2014 - 1/29/2014        RESOLVED: GI bleed ICD-10-CM: K92.2  ICD-9-CM: 578.9  1/13/2014 - 1/29/2014        RESOLVED: Cough ICD-10-CM: R05  ICD-9-CM: 786.2  1/13/2014 - 1/29/2014        RESOLVED: Diarrhea ICD-10-CM: R19.7  ICD-9-CM: 787.91  1/13/2014 - 1/29/2014        RESOLVED: Coffee ground emesis ICD-10-CM: K92.0  ICD-9-CM: 578.0  1/13/2014 - 1/29/2014        RESOLVED: Left flank pain ICD-10-CM: R10.9  ICD-9-CM: 789.09  1/13/2014 - 1/29/2014        RESOLVED: Weakness generalized ICD-10-CM: R53.1  ICD-9-CM: 780.79  1/13/2014 - 8/23/2015        RESOLVED: Hypoxia ICD-10-CM: R09.02  ICD-9-CM: 799.02  1/13/2014 - 1/29/2014        RESOLVED: CAP (community acquired pneumonia) ICD-10-CM: J18.9  ICD-9-CM: 135  1/13/2014 - 1/29/2014        RESOLVED: Pericardial effusion ICD-10-CM: I31.3  ICD-9-CM: 423.9  1/13/2014 - 1/29/2014        RESOLVED: Elevated blood pressure reading without diagnosis of hypertension ICD-10-CM: R03.0  ICD-9-CM: 796.2  1/13/2014 - 1/29/2014        RESOLVED: Mitral regurgitation ICD-10-CM: I34.0  ICD-9-CM: 424.0  10/22/2010 - 1/9/2017              DISCHARGE MEDICATIONS:         · It is important that you take the medication exactly as they are prescribed. · Keep your medication in the bottles provided by the pharmacist and keep a list of the medication names, dosages, and times to be taken in your wallet. · Do not take other medications without consulting your doctor. DIET:  Cardiac Diet    ACTIVITY: Activity as tolerated    ADDITIONAL INFORMATION: If you experience any of the following symptoms then please call your primary care physician or return to the emergency room if you cannot get hold of your doctor: Fever, chills, nausea, vomiting, diarrhea, change in mentation, falling, bleeding, shortness of breath. FOLLOW UP CARE:  Dr. Cristian Mayo MD  you are to call and set up an appointment to see them with in 1 week. Follow-up with specialists at directed by them      Information obtained by :  I understand that if any problems occur once I am at home I am to contact my physician. I understand and acknowledge receipt of the instructions indicated above.                                                                                                                                             Physician's or R.N.'s Signature                                                                  Date/Time Patient or Representative Signature                                                          Date/Time

## 2017-01-23 ENCOUNTER — TELEPHONE (OUTPATIENT)
Dept: CARDIOLOGY CLINIC | Age: 77
End: 2017-01-23

## 2017-01-23 RX ORDER — POTASSIUM CHLORIDE 20 MEQ/1
TABLET, EXTENDED RELEASE ORAL
Qty: 30 TAB | Refills: 0 | Status: SHIPPED | OUTPATIENT
Start: 2017-01-23 | End: 2017-01-31 | Stop reason: ALTCHOICE

## 2017-01-23 RX ORDER — WARFARIN SODIUM 5 MG/1
TABLET ORAL
Qty: 30 TAB | Refills: 0 | Status: SHIPPED | OUTPATIENT
Start: 2017-01-23 | End: 2019-04-28

## 2017-01-23 RX ORDER — WARFARIN 6 MG/1
TABLET ORAL
Qty: 30 TAB | Refills: 0 | Status: SHIPPED | OUTPATIENT
Start: 2017-01-23 | End: 2019-04-28

## 2017-01-23 RX ORDER — DISOPYRAMIDE PHOSPHATE 100 MG/1
CAPSULE ORAL
Qty: 60 CAP | Refills: 0 | Status: SHIPPED | OUTPATIENT
Start: 2017-01-23 | End: 2019-04-28

## 2017-01-23 NOTE — TELEPHONE ENCOUNTER
Michelle would like clarification in regards to patient's medications. Informed her that this would be up to the hospice physician. Contacted [address]. She states she will discuss this further with patient's family and hospice physician. They are due for a team meeting Wednesday. In the meantime, will fill a temporary supply. Shruthi's contact number: [phone].

## 2017-01-23 NOTE — TELEPHONE ENCOUNTER
Please call patients granddaughterMichelle at [phone]. Her grandmother was just sent home from the hospital on 1/22/17. She has some questions/concerns about medications.      Thank you, Dede Lin

## 2017-01-30 ENCOUNTER — TELEPHONE (OUTPATIENT)
Dept: CARDIOLOGY CLINIC | Age: 77
End: 2017-01-30

## 2017-01-30 NOTE — TELEPHONE ENCOUNTER
Marcela Caban calling from the [facility] in [facility]. States that the patient is having trouble swallowing the potassium pill. Requests the medication in liquid form. Pharmacy phone number [phone]. Thanks!

## 2017-01-31 RX ORDER — POTASSIUM CHLORIDE 20MEQ/15ML
20 LIQUID (ML) ORAL DAILY
Qty: 480 ML | Refills: 0 | Status: SHIPPED | OUTPATIENT
Start: 2017-01-31 | End: 2019-04-28

## 2018-08-06 ENCOUNTER — HOSPITAL ENCOUNTER (OUTPATIENT)
Dept: ULTRASOUND IMAGING | Age: 78
Discharge: HOME | End: 2018-08-06
Payer: MEDICARE

## 2018-08-06 DIAGNOSIS — R91.8 LUNG MASS: ICD-10-CM

## 2018-08-06 PROCEDURE — 76604 US EXAM CHEST: CPT

## 2018-08-10 ENCOUNTER — TELEPHONE (OUTPATIENT)
Dept: NEUROLOGY | Age: 78
End: 2018-08-10

## 2018-08-10 NOTE — TELEPHONE ENCOUNTER
----- Message from Kita Soares sent at 8/10/2018  1:28 PM EDT -----  Regarding: Dr. Jessa Ko  Pt's granddaughter Kasandra Grover called concerning her being unable to talk and wanted to know if she would be able to get testing done for dementia and she would like a call back.  Pt best contact number [phone]

## 2018-08-14 ENCOUNTER — TELEPHONE (OUTPATIENT)
Dept: NEUROLOGY | Age: 78
End: 2018-08-14

## 2018-08-14 NOTE — TELEPHONE ENCOUNTER
----- Message from Rogelio Brittle sent at 8/13/2018  4:09 PM EDT -----  Regarding: LIBERTAD Acosta/Hawa Llaneslesly, granddaughter, is returning call to Michael regarding pt's dementia.     Best contact # [phone]

## 2018-08-15 ENCOUNTER — TELEPHONE (OUTPATIENT)
Dept: NEUROLOGY | Age: 78
End: 2018-08-15

## 2018-08-15 NOTE — TELEPHONE ENCOUNTER
----- Message from Angy Pagan sent at 8/15/2018  1:51 PM EDT -----  Regarding: Dave/telephone  Pts grand jones Charlene Hamilton is returning a call from yesterday in regard to an appointment. Ms Leonard Prieto number is [phone].

## 2019-03-04 NOTE — ROUTINE PROCESS
Bedside shift change report given to Eligio Sanders RN (oncoming nurse) by Stacia Hernadez. Christopher Jolly   (offgoing nurse).  Report included the following information Ignacio SILVESTRE and MAR. ' negative

## 2019-04-28 ENCOUNTER — HOSPITAL ENCOUNTER (INPATIENT)
Age: 79
LOS: 4 days | Discharge: HOSPICE | DRG: 178 | End: 2019-05-02
Attending: EMERGENCY MEDICINE | Admitting: INTERNAL MEDICINE
Payer: MEDICARE

## 2019-04-28 ENCOUNTER — APPOINTMENT (OUTPATIENT)
Dept: GENERAL RADIOLOGY | Age: 79
DRG: 178 | End: 2019-04-28
Attending: EMERGENCY MEDICINE
Payer: MEDICARE

## 2019-04-28 ENCOUNTER — APPOINTMENT (OUTPATIENT)
Dept: CT IMAGING | Age: 79
DRG: 178 | End: 2019-04-28
Attending: INTERNAL MEDICINE
Payer: MEDICARE

## 2019-04-28 DIAGNOSIS — R09.02 HYPOXIA: ICD-10-CM

## 2019-04-28 DIAGNOSIS — R79.89 ELEVATED BRAIN NATRIURETIC PEPTIDE (BNP) LEVEL: ICD-10-CM

## 2019-04-28 DIAGNOSIS — Z71.89 DNR (DO NOT RESUSCITATE) DISCUSSION: ICD-10-CM

## 2019-04-28 DIAGNOSIS — Z71.89 GOALS OF CARE, COUNSELING/DISCUSSION: ICD-10-CM

## 2019-04-28 DIAGNOSIS — Z71.89 ADVANCED CARE PLANNING/COUNSELING DISCUSSION: ICD-10-CM

## 2019-04-28 DIAGNOSIS — I69.321 DYSPHASIA AS LATE EFFECT OF CEREBROVASCULAR ACCIDENT (CVA): ICD-10-CM

## 2019-04-28 DIAGNOSIS — R77.8 ELEVATED TROPONIN: ICD-10-CM

## 2019-04-28 DIAGNOSIS — J18.9 PNEUMONIA OF RIGHT LOWER LOBE DUE TO INFECTIOUS ORGANISM: Primary | ICD-10-CM

## 2019-04-28 DIAGNOSIS — N30.01 ACUTE CYSTITIS WITH HEMATURIA: ICD-10-CM

## 2019-04-28 PROBLEM — N39.0 UTI (URINARY TRACT INFECTION): Status: ACTIVE | Noted: 2019-04-28

## 2019-04-28 PROBLEM — Z86.73 HX OF COMPLETED STROKE: Status: ACTIVE | Noted: 2019-04-28

## 2019-04-28 LAB
ALBUMIN SERPL-MCNC: 2.8 G/DL (ref 3.5–5)
ALBUMIN/GLOB SERPL: 0.7 {RATIO} (ref 1.1–2.2)
ALP SERPL-CCNC: 66 U/L (ref 45–117)
ALT SERPL-CCNC: 36 U/L (ref 12–78)
ANION GAP SERPL CALC-SCNC: 3 MMOL/L (ref 5–15)
APPEARANCE UR: ABNORMAL
APTT PPP: 23.1 SEC (ref 22.1–32)
AST SERPL-CCNC: 27 U/L (ref 15–37)
BACTERIA URNS QL MICRO: ABNORMAL /HPF
BASOPHILS # BLD: 0 K/UL (ref 0–0.1)
BASOPHILS NFR BLD: 0 % (ref 0–1)
BILIRUB SERPL-MCNC: 0.6 MG/DL (ref 0.2–1)
BILIRUB UR QL: NEGATIVE
BNP SERPL-MCNC: ABNORMAL PG/ML
BUN SERPL-MCNC: 29 MG/DL (ref 6–20)
BUN/CREAT SERPL: 29 (ref 12–20)
CALCIUM SERPL-MCNC: 8.6 MG/DL (ref 8.5–10.1)
CHLORIDE SERPL-SCNC: 116 MMOL/L (ref 97–108)
CO2 SERPL-SCNC: 28 MMOL/L (ref 21–32)
COLOR UR: ABNORMAL
COMMENT, HOLDF: NORMAL
CREAT SERPL-MCNC: 1 MG/DL (ref 0.55–1.02)
DIFFERENTIAL METHOD BLD: ABNORMAL
EOSINOPHIL # BLD: 0.1 K/UL (ref 0–0.4)
EOSINOPHIL NFR BLD: 1 % (ref 0–7)
EPITH CASTS URNS QL MICRO: ABNORMAL /LPF
ERYTHROCYTE [DISTWIDTH] IN BLOOD BY AUTOMATED COUNT: 11.9 % (ref 11.5–14.5)
FLUAV AG NPH QL IA: NEGATIVE
FLUBV AG NOSE QL IA: NEGATIVE
GLOBULIN SER CALC-MCNC: 4.2 G/DL (ref 2–4)
GLUCOSE SERPL-MCNC: 122 MG/DL (ref 65–100)
GLUCOSE UR STRIP.AUTO-MCNC: NEGATIVE MG/DL
GRAN CASTS URNS QL MICRO: ABNORMAL /LPF
HCT VFR BLD AUTO: 40.9 % (ref 35–47)
HGB BLD-MCNC: 12.9 G/DL (ref 11.5–16)
HGB UR QL STRIP: ABNORMAL
HYALINE CASTS URNS QL MICRO: >20 /LPF (ref 0–5)
IMM GRANULOCYTES # BLD AUTO: 0 K/UL (ref 0–0.04)
IMM GRANULOCYTES NFR BLD AUTO: 0 % (ref 0–0.5)
INR PPP: 1.1 (ref 0.9–1.1)
KETONES UR QL STRIP.AUTO: ABNORMAL MG/DL
LACTATE SERPL-SCNC: 1.1 MMOL/L (ref 0.4–2)
LEUKOCYTE ESTERASE UR QL STRIP.AUTO: ABNORMAL
LYMPHOCYTES # BLD: 1.5 K/UL (ref 0.8–3.5)
LYMPHOCYTES NFR BLD: 14 % (ref 12–49)
MCH RBC QN AUTO: 31.9 PG (ref 26–34)
MCHC RBC AUTO-ENTMCNC: 31.5 G/DL (ref 30–36.5)
MCV RBC AUTO: 101.2 FL (ref 80–99)
MONOCYTES # BLD: 0.7 K/UL (ref 0–1)
MONOCYTES NFR BLD: 7 % (ref 5–13)
NEUTS SEG # BLD: 8.7 K/UL (ref 1.8–8)
NEUTS SEG NFR BLD: 79 % (ref 32–75)
NITRITE UR QL STRIP.AUTO: POSITIVE
NRBC # BLD: 0 K/UL (ref 0–0.01)
NRBC BLD-RTO: 0 PER 100 WBC
PH UR STRIP: 5.5 [PH] (ref 5–8)
PLATELET # BLD AUTO: 188 K/UL (ref 150–400)
PMV BLD AUTO: 12.8 FL (ref 8.9–12.9)
POTASSIUM SERPL-SCNC: 4.1 MMOL/L (ref 3.5–5.1)
PROT SERPL-MCNC: 7 G/DL (ref 6.4–8.2)
PROT UR STRIP-MCNC: 100 MG/DL
PROTHROMBIN TIME: 10.8 SEC (ref 9–11.1)
RBC # BLD AUTO: 4.04 M/UL (ref 3.8–5.2)
RBC #/AREA URNS HPF: ABNORMAL /HPF (ref 0–5)
SAMPLES BEING HELD,HOLD: NORMAL
SODIUM SERPL-SCNC: 147 MMOL/L (ref 136–145)
SP GR UR REFRACTOMETRY: 1.02 (ref 1–1.03)
THERAPEUTIC RANGE,PTTT: NORMAL SECS (ref 58–77)
TROPONIN I SERPL-MCNC: 0.17 NG/ML
UR CULT HOLD, URHOLD: NORMAL
UROBILINOGEN UR QL STRIP.AUTO: 1 EU/DL (ref 0.2–1)
WBC # BLD AUTO: 11.1 K/UL (ref 3.6–11)
WBC URNS QL MICRO: ABNORMAL /HPF (ref 0–4)

## 2019-04-28 PROCEDURE — 87077 CULTURE AEROBIC IDENTIFY: CPT

## 2019-04-28 PROCEDURE — 96365 THER/PROPH/DIAG IV INF INIT: CPT

## 2019-04-28 PROCEDURE — 96375 TX/PRO/DX INJ NEW DRUG ADDON: CPT

## 2019-04-28 PROCEDURE — 74011000258 HC RX REV CODE- 258: Performed by: EMERGENCY MEDICINE

## 2019-04-28 PROCEDURE — 87804 INFLUENZA ASSAY W/OPTIC: CPT

## 2019-04-28 PROCEDURE — 80053 COMPREHEN METABOLIC PANEL: CPT

## 2019-04-28 PROCEDURE — 85610 PROTHROMBIN TIME: CPT

## 2019-04-28 PROCEDURE — 85025 COMPLETE CBC W/AUTO DIFF WBC: CPT

## 2019-04-28 PROCEDURE — 99285 EMERGENCY DEPT VISIT HI MDM: CPT

## 2019-04-28 PROCEDURE — 71045 X-RAY EXAM CHEST 1 VIEW: CPT

## 2019-04-28 PROCEDURE — 93005 ELECTROCARDIOGRAM TRACING: CPT

## 2019-04-28 PROCEDURE — 74011250636 HC RX REV CODE- 250/636: Performed by: INTERNAL MEDICINE

## 2019-04-28 PROCEDURE — 83880 ASSAY OF NATRIURETIC PEPTIDE: CPT

## 2019-04-28 PROCEDURE — 87086 URINE CULTURE/COLONY COUNT: CPT

## 2019-04-28 PROCEDURE — 65270000029 HC RM PRIVATE

## 2019-04-28 PROCEDURE — 87186 SC STD MICRODIL/AGAR DIL: CPT

## 2019-04-28 PROCEDURE — 87040 BLOOD CULTURE FOR BACTERIA: CPT

## 2019-04-28 PROCEDURE — 81001 URINALYSIS AUTO W/SCOPE: CPT

## 2019-04-28 PROCEDURE — 77030011943

## 2019-04-28 PROCEDURE — 83605 ASSAY OF LACTIC ACID: CPT

## 2019-04-28 PROCEDURE — 71275 CT ANGIOGRAPHY CHEST: CPT

## 2019-04-28 PROCEDURE — 51701 INSERT BLADDER CATHETER: CPT

## 2019-04-28 PROCEDURE — 94762 N-INVAS EAR/PLS OXIMTRY CONT: CPT

## 2019-04-28 PROCEDURE — 84484 ASSAY OF TROPONIN QUANT: CPT

## 2019-04-28 PROCEDURE — 74011636320 HC RX REV CODE- 636/320: Performed by: RADIOLOGY

## 2019-04-28 PROCEDURE — 36415 COLL VENOUS BLD VENIPUNCTURE: CPT

## 2019-04-28 PROCEDURE — 74011250636 HC RX REV CODE- 250/636: Performed by: EMERGENCY MEDICINE

## 2019-04-28 PROCEDURE — 85730 THROMBOPLASTIN TIME PARTIAL: CPT

## 2019-04-28 RX ORDER — ACETAMINOPHEN 325 MG/1
650 TABLET ORAL
Status: DISCONTINUED | OUTPATIENT
Start: 2019-04-28 | End: 2019-05-02 | Stop reason: HOSPADM

## 2019-04-28 RX ORDER — BRIMONIDINE TARTRATE 2 MG/ML
1 SOLUTION/ DROPS OPHTHALMIC 2 TIMES DAILY
COMMUNITY

## 2019-04-28 RX ORDER — ENOXAPARIN SODIUM 100 MG/ML
40 INJECTION SUBCUTANEOUS EVERY 24 HOURS
Status: DISCONTINUED | OUTPATIENT
Start: 2019-04-28 | End: 2019-05-02 | Stop reason: HOSPADM

## 2019-04-28 RX ORDER — BRIMONIDINE TARTRATE 2 MG/ML
1 SOLUTION/ DROPS OPHTHALMIC 2 TIMES DAILY
Status: DISCONTINUED | OUTPATIENT
Start: 2019-04-28 | End: 2019-05-02 | Stop reason: HOSPADM

## 2019-04-28 RX ORDER — MEMANTINE HYDROCHLORIDE 14 MG/1
28 CAPSULE, EXTENDED RELEASE ORAL DAILY
Status: DISCONTINUED | OUTPATIENT
Start: 2019-04-29 | End: 2019-04-30

## 2019-04-28 RX ORDER — LATANOPROST 50 UG/ML
1 SOLUTION/ DROPS OPHTHALMIC
Status: DISCONTINUED | OUTPATIENT
Start: 2019-04-28 | End: 2019-05-02 | Stop reason: HOSPADM

## 2019-04-28 RX ORDER — LOPERAMIDE HYDROCHLORIDE 2 MG/1
2 CAPSULE ORAL AS NEEDED
COMMUNITY

## 2019-04-28 RX ORDER — SODIUM CHLORIDE 0.9 % (FLUSH) 0.9 %
5-40 SYRINGE (ML) INJECTION EVERY 8 HOURS
Status: DISCONTINUED | OUTPATIENT
Start: 2019-04-28 | End: 2019-05-02 | Stop reason: HOSPADM

## 2019-04-28 RX ORDER — METRONIDAZOLE 500 MG/100ML
500 INJECTION, SOLUTION INTRAVENOUS EVERY 12 HOURS
Status: DISCONTINUED | OUTPATIENT
Start: 2019-04-28 | End: 2019-04-28

## 2019-04-28 RX ORDER — POTASSIUM CHLORIDE 750 MG/1
20 TABLET, FILM COATED, EXTENDED RELEASE ORAL DAILY
COMMUNITY
End: 2019-05-02

## 2019-04-28 RX ORDER — ACETAMINOPHEN 650 MG/1
650 SUPPOSITORY RECTAL
COMMUNITY

## 2019-04-28 RX ORDER — SODIUM CHLORIDE 0.9 % (FLUSH) 0.9 %
5-40 SYRINGE (ML) INJECTION AS NEEDED
Status: DISCONTINUED | OUTPATIENT
Start: 2019-04-28 | End: 2019-05-02 | Stop reason: HOSPADM

## 2019-04-28 RX ORDER — ACETAMINOPHEN 325 MG/1
325 TABLET ORAL
COMMUNITY

## 2019-04-28 RX ORDER — DORZOLAMIDE HYDROCHLORIDE AND TIMOLOL MALEATE 20; 5 MG/ML; MG/ML
1 SOLUTION/ DROPS OPHTHALMIC 2 TIMES DAILY
Status: DISCONTINUED | OUTPATIENT
Start: 2019-04-28 | End: 2019-05-02 | Stop reason: HOSPADM

## 2019-04-28 RX ORDER — LINEZOLID 2 MG/ML
600 INJECTION, SOLUTION INTRAVENOUS EVERY 12 HOURS
Status: DISCONTINUED | OUTPATIENT
Start: 2019-04-28 | End: 2019-05-01

## 2019-04-28 RX ORDER — BIMATOPROST 0.3 MG/ML
1 SOLUTION/ DROPS OPHTHALMIC
COMMUNITY

## 2019-04-28 RX ORDER — BIMATOPROST 0.3 MG/ML
1 SOLUTION/ DROPS OPHTHALMIC
Status: DISCONTINUED | OUTPATIENT
Start: 2019-04-28 | End: 2019-04-28 | Stop reason: CLARIF

## 2019-04-28 RX ORDER — LEVOFLOXACIN 5 MG/ML
750 INJECTION, SOLUTION INTRAVENOUS
Status: DISCONTINUED | OUTPATIENT
Start: 2019-04-28 | End: 2019-04-29

## 2019-04-28 RX ORDER — SODIUM CHLORIDE 9 MG/ML
75 INJECTION, SOLUTION INTRAVENOUS CONTINUOUS
Status: DISPENSED | OUTPATIENT
Start: 2019-04-28 | End: 2019-04-29

## 2019-04-28 RX ORDER — NALOXONE HYDROCHLORIDE 0.4 MG/ML
0.4 INJECTION, SOLUTION INTRAMUSCULAR; INTRAVENOUS; SUBCUTANEOUS AS NEEDED
Status: DISCONTINUED | OUTPATIENT
Start: 2019-04-28 | End: 2019-05-02 | Stop reason: HOSPADM

## 2019-04-28 RX ORDER — METOPROLOL TARTRATE 25 MG/1
75 TABLET, FILM COATED ORAL 2 TIMES DAILY
COMMUNITY
End: 2019-05-02

## 2019-04-28 RX ADMIN — LINEZOLID 600 MG: 600 INJECTION, SOLUTION INTRAVENOUS at 22:36

## 2019-04-28 RX ADMIN — Medication 5 ML: at 22:00

## 2019-04-28 RX ADMIN — LEVOFLOXACIN 750 MG: 5 INJECTION, SOLUTION INTRAVENOUS at 20:41

## 2019-04-28 RX ADMIN — IOPAMIDOL 75 ML: 755 INJECTION, SOLUTION INTRAVENOUS at 22:14

## 2019-04-28 RX ADMIN — CEFEPIME HYDROCHLORIDE 2 G: 2 INJECTION, POWDER, FOR SOLUTION INTRAVENOUS at 20:11

## 2019-04-28 RX ADMIN — SODIUM CHLORIDE 75 ML/HR: 900 INJECTION, SOLUTION INTRAVENOUS at 22:36

## 2019-04-28 NOTE — PROGRESS NOTES
[facility] Dosing Services: 4/28/19 The following medication: Cefepime was automatically dose-adjusted per [facility] P&T Committee Protocol, with respect to renal function. Consult provided for this   66 y.o. , female , for the indication of HAP. Dosage changed to:  Cefepime 2gm IV every 12 hrs Previous Regimen Cefepime 2gm IV every 8 hrs Serum Creatinine Lab Results Component Value Date/Time Creatinine 1.00 04/28/2019 07:02 PM  
 Creatinine (POC) 1.1 07/21/2016 08:58 PM  
 
   
Creatinine Clearance Estimated Creatinine Clearance: 43.4 mL/min (based on SCr of 1 mg/dL). BUN Lab Results Component Value Date/Time BUN 29 (H) 04/28/2019 07:02 PM  
 BUN (POC) 15 07/21/2016 08:58 PM  
 
   
 
 
Additional notes: 
 
 
Pharmacy to continue to monitor patient daily. Will make dosage adjustments based upon changing renal function. Signed Rosie PEDERSEN [address] [place] [address] information:  [phone]

## 2019-04-28 NOTE — ED PROVIDER NOTES
66 y.o. female with past medical history significant for Paroxysmal atrial fibrillation, Hypertrophic cardiomyopathy, CAD, HTN, Dementia, Cerebral atrophy, Stroke, who presents from [facility] via EMS accompanied by her Daughter and  with chief complaint of fever. Family at bedside state the pt is non-verbal at baseline s/p previous stroke, but since last night she has not been acting herself. They notes she has been sleeping throughout the day with a persistent productive cough. Pt was noted to have a fever today per Nursing facility staff. She was also placed on [place] oxygen at the facility, does not wear oxygen at baseline. Pt is an active DNR. Full history, physical exam, and ROS unable to be obtained due to:  dementia and non-verbal. 
 
PMHx: Significant for Hypertrophic subaortic stenosis, Mitral regurgitation, Paroxysmal atrial fibrillation, Hypertrophic cardiomyopathy, CAD, HTN, Depression, Dementia, Cerebral atrophy, Stroke PSHx: Significant for cardiac catheterization Social Hx: negative tobacco use, negative EtOH use, negative Illicit Drug use PCP: Lawanda Renee MD 
 
Note written by Hari Green Do, as dictated by Horacio Zamora DO 7:07 PM 
 
The history is provided by the EMS personnel, a relative and the nursing home. No  was used. Past Medical History:  
Diagnosis Date  CAD (coronary artery disease)  Carotid occlusion, left 10/25/2016  Cerebral atrophy 10/25/2016  Dementia  Depression  Glaucoma  Hx of completed stroke  Hyperlipidemia LDL goal <70 10/25/2016  Hypertension  Hypertrophic cardiomyopathy ([facility] [address] 6/1/2012  Hypertrophic subaortic stenosis (idiopathic) (HCC)  Mitral regurgitation  Paroxysmal atrial fibrillation ([address] 11/29/2011 Past Surgical History:  
Procedure Laterality Date [address]  2004 Normal cors, EF 60%, significant MR. EDP 23  CARDIAC SURG PROCEDURE UNLIST  ECHO 2D ADULT  8/20/2010 HCM, Resting LVOT gradient 129 mmHg, grade 3-4 diastolic dysfunction, MAC, mod-severe MR, marked LAE.  ECHO 2D ADULT  5/27/11  
 mod-severe LVH, EF 65%, grade 3-4 diastolic dysfunction, LVOT resting gradient 42 mmHg, severe LAE, mod-severe MR, PA 46 mmHg Family History:  
Problem Relation Age of Onset  Other Other   
     patient specifically denies the following in 1st degree relative: HTN, DM, CVA, CAD, ca  
 Heart Disease Mother Social History Socioeconomic History  Marital status:  Spouse name: Not on file  Number of children: Not on file  Years of education: Not on file  Highest education level: Not on file Occupational History  Not on file Social Needs  Financial resource strain: Not on file  Food insecurity:  
  Worry: Not on file Inability: Not on file  Transportation needs:  
  Medical: Not on file Non-medical: Not on file Tobacco Use  Smoking status: Never Smoker  Smokeless tobacco: Never Used Substance and Sexual Activity  Alcohol use: No  
  Alcohol/week: 0.0 oz  Drug use: No  
 Sexual activity: Not on file Lifestyle  Physical activity:  
  Days per week: Not on file Minutes per session: Not on file  Stress: Not on file Relationships  Social connections:  
  Talks on phone: Not on file Gets together: Not on file Attends Yarsani service: Not on file Active member of club or organization: Not on file Attends meetings of clubs or organizations: Not on file Relationship status: Not on file  Intimate partner violence:  
  Fear of current or ex partner: Not on file Emotionally abused: Not on file Physically abused: Not on file Forced sexual activity: Not on file Other Topics Concern  Not on file Social History Narrative  Not on file ALLERGIES: Patient has no known allergies. Review of Systems Unable to perform ROS: Dementia Vitals:  
 04/28/19 2100 04/28/19 2130 04/28/19 2200 04/28/19 2245 BP: 126/75 124/78 132/87 Pulse:      
Resp:      
Temp:      
SpO2: 98% 95%  98% Weight:      
Height:      
      
 
Physical Exam  
Constitutional: She appears well-developed and well-nourished. No distress. Non-verbal  
HENT:  
Head: Normocephalic and atraumatic. Right Ear: External ear normal.  
Left Ear: External ear normal.  
Nose: Nose normal.  
Mouth/Throat: Oropharynx is clear and moist. No oropharyngeal exudate. Eyes: Pupils are equal, round, and reactive to light. Conjunctivae and EOM are normal. Right eye exhibits no discharge. Left eye exhibits no discharge. No scleral icterus. Neck: Normal range of motion. Neck supple. No JVD present. No tracheal deviation present. Cardiovascular: Normal rate, regular rhythm, normal heart sounds and intact distal pulses. Exam reveals no gallop and no friction rub. No murmur heard. Pulmonary/Chest: Effort normal. No stridor. No respiratory distress. She has no decreased breath sounds. She has no wheezes. She has rhonchi (bilateral bases) in the right lower field and the left lower field. She has no rales. She exhibits no tenderness. Abdominal: Soft. Bowel sounds are normal. She exhibits no distension. There is no tenderness. There is no rebound and no guarding. Musculoskeletal: Normal range of motion. She exhibits no edema or tenderness. No lower extremity edema Neurological: She is alert. She has normal strength and normal reflexes. She is disoriented. She displays normal reflexes. No sensory deficit. She exhibits normal muscle tone. Pt currently at baseline being non-verbal  
Skin: Skin is warm and dry. Capillary refill takes less than 2 seconds. No rash noted. She is not diaphoretic. No erythema. No pallor. Psychiatric: She has a normal mood and affect. Her behavior is normal.  
Nursing note and vitals reviewed. Note written by Hari Ann, as dictated by Daniel Hanson DO 7:07 PM 
 
MDM Number of Diagnoses or Management Options Acute cystitis with hematuria:  
Elevated brain natriuretic peptide (BNP) level:  
Elevated troponin: Hypoxia:  
Pneumonia of right lower lobe due to infectious organism [facility]): Amount and/or Complexity of Data Reviewed Clinical lab tests: ordered and reviewed Tests in the radiology section of CPT®: ordered and reviewed Tests in the medicine section of CPT®: ordered and reviewed Discuss the patient with other providers: yes (Hospitalist) Independent visualization of images, tracings, or specimens: yes (ekg) Risk of Complications, Morbidity, and/or Mortality Presenting problems: moderate Diagnostic procedures: moderate Management options: moderate Critical Care Total time providing critical care: 30-74 minutes (Total critical care time spent exclusive of procedures:  35 minutes) Patient Progress Patient progress: stable Procedures Chief Complaint Patient presents with  Fever  Cough The patient's presenting problems have been discussed, and they are in agreement with the care plan formulated and outlined with them. I have encouraged them to ask questions as they arise throughout their visit. MEDICATIONS GIVEN: 
Medications  
levoFLOXacin (LEVAQUIN) 750 mg in D5W IVPB (750 mg IntraVENous New Bag 4/28/19 2041)  
sodium chloride (NS) flush 5-40 mL (5 mL IntraVENous Given 4/28/19 2200)  
sodium chloride (NS) flush 5-40 mL (has no administration in time range)  
acetaminophen (TYLENOL) tablet 650 mg (has no administration in time range)  
naloxone (NARCAN) injection 0.4 mg (has no administration in time range) 0.9% sodium chloride infusion (75 mL/hr IntraVENous New Bag 4/28/19 2236) piperacillin-tazobactam (ZOSYN) 3.375 g in 0.9% sodium chloride (MBP/ADV) 100 mL (has no administration in time range) piperacillin-tazobactam (ZOSYN) 3.375 g in 0.9% sodium chloride (MBP/ADV) 100 mL ADV (has no administration in time range) brimonidine (ALPHAGAN) 0.2 % ophthalmic solution 1 Drop (has no administration in time range)  
dorzolamide-timolol (COSOPT) 22.3-6.8 mg/mL ophthalmic solution 1 Drop (has no administration in time range) memantine ER (NAMENDA XR) capsule 28 mg (has no administration in time range) geriatric multivitamins & minerals (ELDERTONIC) oral elixir 15 mL (has no administration in time range) metoprolol tartrate (LOPRESSOR) tablet 75 mg (has no administration in time range)  
linezolid in dextrose 5% (ZYVOX) IVPB premix in D5W 600 mg (600 mg IntraVENous Given 4/28/19 2236)  
latanoprost (XALATAN) 0.005 % ophthalmic solution 1 Drop (has no administration in time range)  
enoxaparin (LOVENOX) injection 40 mg (has no administration in time range) iopamidol (ISOVUE-370) 76 % injection 100 mL (75 mL IntraVENous Given 4/28/19 2214) LABS REVIEWED: 
Recent Results (from the past 24 hour(s)) SAMPLES BEING HELD Collection Time: 04/28/19  7:02 PM  
Result Value Ref Range SAMPLES BEING HELD RED. SST   
 COMMENT Add-on orders for these samples will be processed based on acceptable specimen integrity and analyte stability, which may vary by analyte. CBC WITH AUTOMATED DIFF Collection Time: 04/28/19  7:02 PM  
Result Value Ref Range WBC 11.1 (H) 3.6 - 11.0 K/uL  
 RBC 4.04 3.80 - 5.20 M/uL  
 HGB 12.9 11.5 - 16.0 g/dL HCT 40.9 35.0 - 47.0 % .2 (H) 80.0 - 99.0 FL  
 MCH 31.9 26.0 - 34.0 PG  
 MCHC 31.5 30.0 - 36.5 g/dL  
 RDW 11.9 11.5 - 14.5 % PLATELET 180 907 - 222 K/uL MPV 12.8 8.9 - 12.9 FL  
 NRBC 0.0 0  WBC ABSOLUTE NRBC 0.00 0.00 - 0.01 K/uL NEUTROPHILS 79 (H) 32 - 75 % LYMPHOCYTES 14 12 - 49 % MONOCYTES 7 5 - 13 % EOSINOPHILS 1 0 - 7 % BASOPHILS 0 0 - 1 % IMMATURE GRANULOCYTES 0 0.0 - 0.5 % ABS. NEUTROPHILS 8.7 (H) 1.8 - 8.0 K/UL  
 ABS. LYMPHOCYTES 1.5 0.8 - 3.5 K/UL  
 ABS. MONOCYTES 0.7 0.0 - 1.0 K/UL  
 ABS. EOSINOPHILS 0.1 0.0 - 0.4 K/UL  
 ABS. BASOPHILS 0.0 0.0 - 0.1 K/UL  
 ABS. IMM. GRANS. 0.0 0.00 - 0.04 K/UL  
 DF AUTOMATED METABOLIC PANEL, COMPREHENSIVE Collection Time: 04/28/19  7:02 PM  
Result Value Ref Range Sodium 147 (H) 136 - 145 mmol/L Potassium 4.1 3.5 - 5.1 mmol/L Chloride 116 (H) 97 - 108 mmol/L  
 CO2 28 21 - 32 mmol/L Anion gap 3 (L) 5 - 15 mmol/L Glucose 122 (H) 65 - 100 mg/dL BUN 29 (H) 6 - 20 MG/DL Creatinine 1.00 0.55 - 1.02 MG/DL  
 BUN/Creatinine ratio 29 (H) 12 - 20 GFR est AA >60 >60 ml/min/1.73m2 GFR est non-AA 54 (L) >60 ml/min/1.73m2 Calcium 8.6 8.5 - 10.1 MG/DL Bilirubin, total 0.6 0.2 - 1.0 MG/DL  
 ALT (SGPT) 36 12 - 78 U/L  
 AST (SGOT) 27 15 - 37 U/L Alk. phosphatase 66 45 - 117 U/L Protein, total 7.0 6.4 - 8.2 g/dL Albumin 2.8 (L) 3.5 - 5.0 g/dL Globulin 4.2 (H) 2.0 - 4.0 g/dL A-G Ratio 0.7 (L) 1.1 - 2.2    
TROPONIN I Collection Time: 04/28/19  7:02 PM  
Result Value Ref Range Troponin-I, Qt. 0.17 (H) <0.05 ng/mL LACTIC ACID Collection Time: 04/28/19  7:02 PM  
Result Value Ref Range Lactic acid 1.1 0.4 - 2.0 MMOL/L  
PTT Collection Time: 04/28/19  7:02 PM  
Result Value Ref Range aPTT 23.1 22.1 - 32.0 sec  
 aPTT, therapeutic range     58.0 - 77.0 SECS  
PROTHROMBIN TIME + INR Collection Time: 04/28/19  7:02 PM  
Result Value Ref Range INR 1.1 0.9 - 1.1 Prothrombin time 10.8 9.0 - 11.1 sec NT-PRO BNP Collection Time: 04/28/19  7:02 PM  
Result Value Ref Range NT pro-BNP 33,462 (H) <450 PG/ML  
INFLUENZA A & B AG (RAPID TEST) Collection Time: 04/28/19  7:36 PM  
Result Value Ref Range Influenza A Antigen NEGATIVE  NEG Influenza B Antigen NEGATIVE  NEG    
URINALYSIS W/MICROSCOPIC Collection Time: 04/28/19  7:36 PM  
Result Value Ref Range Color DARK YELLOW Appearance TURBID (A) CLEAR Specific gravity 1.025 1.003 - 1.030    
 pH (UA) 5.5 5.0 - 8.0 Protein 100 (A) NEG mg/dL Glucose NEGATIVE  NEG mg/dL Ketone TRACE (A) NEG mg/dL Bilirubin NEGATIVE  NEG Blood MODERATE (A) NEG Urobilinogen 1.0 0.2 - 1.0 EU/dL Nitrites POSITIVE (A) NEG Leukocyte Esterase SMALL (A) NEG    
 WBC  0 - 4 /hpf  
 RBC 5-10 0 - 5 /hpf Epithelial cells MANY (A) FEW /lpf Bacteria 4+ (A) NEG /hpf Hyaline cast >20 (H) 0 - 5 /lpf Granular cast 5-10 (A) NEG /lpf URINE CULTURE HOLD SAMPLE Collection Time: 04/28/19  7:36 PM  
Result Value Ref Range Urine culture hold URINE ON HOLD IN MICROBIOLOGY DEPT FOR 3 DAYS. IF UNPRESERVED URINE IS SUBMITTED, IT CANNOT BE USED FOR ADDITIONAL TESTING AFTER 24 HRS, RECOLLECTION WILL BE REQUIRED. VITAL SIGNS: 
Patient Vitals for the past 12 hrs: 
 Temp Pulse Resp BP SpO2  
04/28/19 2245     98 % 04/28/19 2200    132/87   
04/28/19 2130    124/78 95 % 04/28/19 2100    126/75 98 % 04/28/19 2015    118/76 98 % 04/28/19 1945    112/67 98 % 04/28/19 1915    123/73 98 % 04/28/19 1853     92 % 04/28/19 1847 98.3 °F (36.8 °C) 69 18 120/73 93 % RADIOLOGY RESULTS: 
The following have been ordered and reviewed: 
Cta Chest W Or W Wo Cont Result Date: 4/28/2019 INDICATION:  dyspnea, rule out PE, PNA EXAM:  CTA Chest with contrast for Pulmonary Embolus COMPARISON:  January 9, 2017 TECHNIQUE:  Following the uneventful intravenous administration of IV contrast thin helical axial images were obtained through the chest. 3D image postprocessing was performed. CT dose reduction was achieved through use of a standardized protocol tailored for this examination and automatic exposure control for dose modulation. FINDINGS: THYROID: Small bilateral nodules. MEDIASTINUM/RAY: No mass or lymphadenopathy. HEART/PERICARDIUM: Enlarged. Small pericardial effusion. PULMONARY ARTERIES:No pulmonary embolism. AORTA:  No aneurysm. LUNGS/PLEURA: Extensive right lower lobe atelectasis. Mild left lower lobe airspace disease/atelectasis. Extensive fluid /debris within the bilateral mainstem bronchi and lower lobe airways. Mild emphysema. No pulmonary edema. Trace pleural effusions. No pneumothorax. INCIDENTALLY IMAGED UPPER ABDOMEN: No significant abnormality. BONES: Severe scoliosis of the spine. ADDITIONAL COMMENTS:  N/A IMPRESSION: 1. No acute pulmonary embolus. 2. Extensive right lower lobe atelectasis and mild left lower lobe atelectasis, with trace pleural effusions. Extensive fluid/debris within the bilateral airways. Xr Chest [facility] Result Date: 4/28/2019 EXAM:  XR CHEST PORT INDICATION:  cough, fever COMPARISON:  2017 FINDINGS: A portable AP radiograph of the chest was obtained at [facility]. Depth of inspiration is shallow. There is suspicion of right lower lobe atelectasis. There is also possible increased density left base. .  There is moderately severe cardiomegaly. There is dextroconvex thoracic scoliosis. Nicole Bhakta IMPRESSION: There is suspicion of right lower lobe atelectasis/airspace disease. There is also increased density left base could represent atelectasis. PA and lateral views are recommended when the patient is stable or alternatively, CT can be performed. ED EKG interpretation: 
Rhythm: normal sinus rhythm; and regular . Rate (approx.): 68; Axis: normal; P wave: normal; QRS interval: normal ; ST/T wave: normal; Other findings: abnormal ekg, possible LAE, LVH. This EKG was interpreted by Beverly Yo DO, ED Provider. CONSULTATIONS:  
 
CONSULT NOTE: 
8:03 PM  
Shira Perrin DO spoke with Dr. Kristin Frias, Consult for Hospitalist.  Discussed available diagnostic tests and clinical findings. Will admit. PROGRESS NOTES: 
 
8:00 PM 
Discussed results and plan with patient.  Patient will be admitted/observed for further evaluation and treatment. DIAGNOSIS: 
 
1. Pneumonia of right lower lobe due to infectious organism [facility]) 2. Elevated troponin 3. Elevated brain natriuretic peptide (BNP) level 4. Hypoxia 5. Acute cystitis with hematuria PLAN: 
 
8:03 PM 
Patient is being admitted to the hospital.  The results of their tests and reasons for their admission have been discussed with them and/or available family. They convey agreement and understanding for the need to be admitted and for their admission diagnosis. ED COURSE: The patient's hospital course has been uncomplicated.

## 2019-04-28 NOTE — ED TRIAGE NOTES
Pt arrives via EMS from [address] home for c/o fever and productive cough. Pt's  reports pt started feeling bad last night. Pt nonverbal and does not follow commands at baseline.

## 2019-04-29 ENCOUNTER — APPOINTMENT (OUTPATIENT)
Dept: NON INVASIVE DIAGNOSTICS | Age: 79
DRG: 178 | End: 2019-04-29
Attending: INTERNAL MEDICINE
Payer: MEDICARE

## 2019-04-29 ENCOUNTER — APPOINTMENT (OUTPATIENT)
Dept: VASCULAR SURGERY | Age: 79
DRG: 178 | End: 2019-04-29
Attending: INTERNAL MEDICINE
Payer: MEDICARE

## 2019-04-29 LAB
ALBUMIN SERPL-MCNC: 2.1 G/DL (ref 3.5–5)
ALBUMIN/GLOB SERPL: 0.6 {RATIO} (ref 1.1–2.2)
ALP SERPL-CCNC: 51 U/L (ref 45–117)
ALT SERPL-CCNC: 25 U/L (ref 12–78)
ANION GAP SERPL CALC-SCNC: 3 MMOL/L (ref 5–15)
AST SERPL-CCNC: 18 U/L (ref 15–37)
ATRIAL RATE: 68 BPM
B PERT DNA SPEC QL NAA+PROBE: NOT DETECTED
BASOPHILS # BLD: 0 K/UL (ref 0–0.1)
BASOPHILS NFR BLD: 0 % (ref 0–1)
BILIRUB SERPL-MCNC: 0.6 MG/DL (ref 0.2–1)
BNP SERPL-MCNC: ABNORMAL PG/ML
BUN SERPL-MCNC: 24 MG/DL (ref 6–20)
BUN/CREAT SERPL: 33 (ref 12–20)
C PNEUM DNA SPEC QL NAA+PROBE: NOT DETECTED
CALCIUM SERPL-MCNC: 7.1 MG/DL (ref 8.5–10.1)
CALCULATED P AXIS, ECG09: 61 DEGREES
CALCULATED R AXIS, ECG10: 9 DEGREES
CALCULATED T AXIS, ECG11: 160 DEGREES
CHLORIDE SERPL-SCNC: 118 MMOL/L (ref 97–108)
CK MB CFR SERPL CALC: 3.7 % (ref 0–2.5)
CK MB SERPL-MCNC: 1.1 NG/ML (ref 5–25)
CK SERPL-CCNC: 30 U/L (ref 26–192)
CO2 SERPL-SCNC: 26 MMOL/L (ref 21–32)
CREAT SERPL-MCNC: 0.73 MG/DL (ref 0.55–1.02)
DIAGNOSIS, 93000: NORMAL
DIFFERENTIAL METHOD BLD: ABNORMAL
ECHO AV AREA PEAK VELOCITY: 2.8 CM2
ECHO AV AREA/BSA PEAK VELOCITY: 1.7 CM2/M2
ECHO AV PEAK GRADIENT: 9.7 MMHG
ECHO AV PEAK VELOCITY: 155.66 CM/S
ECHO LA VOL 2C: 93.03 ML (ref 22–52)
ECHO LA VOL 4C: 132.29 ML (ref 22–52)
ECHO LA VOL BP: 140.08 ML (ref 22–52)
ECHO LA VOL/BSA BIPLANE: 82.77 ML/M2 (ref 16–28)
ECHO LA VOLUME INDEX A2C: 54.97 ML/M2 (ref 16–28)
ECHO LA VOLUME INDEX A4C: 78.16 ML/M2 (ref 16–28)
ECHO LV INTERNAL DIMENSION DIASTOLIC: 3.13 CM (ref 3.9–5.3)
ECHO LV INTERNAL DIMENSION SYSTOLIC: 2.39 CM
ECHO LV IVSD: 1.55 CM (ref 0.6–0.9)
ECHO LV MASS 2D: 212.8 G (ref 67–162)
ECHO LV MASS INDEX 2D: 125.7 G/M2 (ref 43–95)
ECHO LV POSTERIOR WALL DIASTOLIC: 1.6 CM (ref 0.6–0.9)
ECHO LVOT DIAM: 1.65 CM
ECHO LVOT PEAK GRADIENT: 16.9 MMHG
ECHO LVOT PEAK VELOCITY: 205.6 CM/S
ECHO MV A VELOCITY: 72.06 CM/S
ECHO MV E DECELERATION TIME (DT): 228.9 MS
ECHO MV E VELOCITY: 57.78 CM/S
ECHO MV E/A RATIO: 0.8
ECHO MV REGURGITANT RADIUS PISA: 0.53 CM
ECHO PV MAX VELOCITY: 83.03 CM/S
ECHO PV PEAK GRADIENT: 2.8 MMHG
ECHO RV INTERNAL DIMENSION: 3.37 CM
ECHO TV REGURGITANT MAX VELOCITY: 261.18 CM/S
ECHO TV REGURGITANT PEAK GRADIENT: 27.3 MMHG
EOSINOPHIL # BLD: 0 K/UL (ref 0–0.4)
EOSINOPHIL NFR BLD: 0 % (ref 0–7)
ERYTHROCYTE [DISTWIDTH] IN BLOOD BY AUTOMATED COUNT: 11.9 % (ref 11.5–14.5)
FLUAV H1 2009 PAND RNA SPEC QL NAA+PROBE: NOT DETECTED
FLUAV H1 RNA SPEC QL NAA+PROBE: NOT DETECTED
FLUAV H3 RNA SPEC QL NAA+PROBE: NOT DETECTED
FLUAV SUBTYP SPEC NAA+PROBE: NOT DETECTED
FLUBV RNA SPEC QL NAA+PROBE: NOT DETECTED
GLOBULIN SER CALC-MCNC: 3.3 G/DL (ref 2–4)
GLUCOSE SERPL-MCNC: 124 MG/DL (ref 65–100)
HADV DNA SPEC QL NAA+PROBE: NOT DETECTED
HCOV 229E RNA SPEC QL NAA+PROBE: NOT DETECTED
HCOV HKU1 RNA SPEC QL NAA+PROBE: NOT DETECTED
HCOV NL63 RNA SPEC QL NAA+PROBE: NOT DETECTED
HCOV OC43 RNA SPEC QL NAA+PROBE: NOT DETECTED
HCT VFR BLD AUTO: 34.1 % (ref 35–47)
HGB BLD-MCNC: 10.7 G/DL (ref 11.5–16)
HMPV RNA SPEC QL NAA+PROBE: NOT DETECTED
HPIV1 RNA SPEC QL NAA+PROBE: NOT DETECTED
HPIV2 RNA SPEC QL NAA+PROBE: NOT DETECTED
HPIV3 RNA SPEC QL NAA+PROBE: DETECTED
HPIV4 RNA SPEC QL NAA+PROBE: NOT DETECTED
IMM GRANULOCYTES # BLD AUTO: 0 K/UL (ref 0–0.04)
IMM GRANULOCYTES NFR BLD AUTO: 0 % (ref 0–0.5)
LYMPHOCYTES # BLD: 1.2 K/UL (ref 0.8–3.5)
LYMPHOCYTES NFR BLD: 13 % (ref 12–49)
M PNEUMO DNA SPEC QL NAA+PROBE: NOT DETECTED
MAGNESIUM SERPL-MCNC: 1.8 MG/DL (ref 1.6–2.4)
MCH RBC QN AUTO: 32.4 PG (ref 26–34)
MCHC RBC AUTO-ENTMCNC: 31.4 G/DL (ref 30–36.5)
MCV RBC AUTO: 103.3 FL (ref 80–99)
MONOCYTES # BLD: 0.7 K/UL (ref 0–1)
MONOCYTES NFR BLD: 8 % (ref 5–13)
NEUTS SEG # BLD: 7.1 K/UL (ref 1.8–8)
NEUTS SEG NFR BLD: 78 % (ref 32–75)
NRBC # BLD: 0 K/UL (ref 0–0.01)
NRBC BLD-RTO: 0 PER 100 WBC
P-R INTERVAL, ECG05: 166 MS
PHOSPHATE SERPL-MCNC: 1.8 MG/DL (ref 2.6–4.7)
PLATELET # BLD AUTO: 144 K/UL (ref 150–400)
PMV BLD AUTO: 12.9 FL (ref 8.9–12.9)
POTASSIUM SERPL-SCNC: 3.4 MMOL/L (ref 3.5–5.1)
PROT SERPL-MCNC: 5.4 G/DL (ref 6.4–8.2)
Q-T INTERVAL, ECG07: 412 MS
QRS DURATION, ECG06: 106 MS
QTC CALCULATION (BEZET), ECG08: 438 MS
RBC # BLD AUTO: 3.3 M/UL (ref 3.8–5.2)
RSV RNA SPEC QL NAA+PROBE: NOT DETECTED
RV+EV RNA SPEC QL NAA+PROBE: NOT DETECTED
SODIUM SERPL-SCNC: 147 MMOL/L (ref 136–145)
TROPONIN I SERPL-MCNC: 0.11 NG/ML
VENTRICULAR RATE, ECG03: 68 BPM
WBC # BLD AUTO: 9.2 K/UL (ref 3.6–11)

## 2019-04-29 PROCEDURE — 85025 COMPLETE CBC W/AUTO DIFF WBC: CPT

## 2019-04-29 PROCEDURE — 77030029684 HC NEB SM VOL KT MONA -A

## 2019-04-29 PROCEDURE — 83735 ASSAY OF MAGNESIUM: CPT

## 2019-04-29 PROCEDURE — 77030020186 HC BOOT HL PROTCT SAGE -B

## 2019-04-29 PROCEDURE — 83880 ASSAY OF NATRIURETIC PEPTIDE: CPT

## 2019-04-29 PROCEDURE — 65660000000 HC RM CCU STEPDOWN

## 2019-04-29 PROCEDURE — 84484 ASSAY OF TROPONIN QUANT: CPT

## 2019-04-29 PROCEDURE — 94640 AIRWAY INHALATION TREATMENT: CPT

## 2019-04-29 PROCEDURE — 74011250636 HC RX REV CODE- 250/636: Performed by: INTERNAL MEDICINE

## 2019-04-29 PROCEDURE — 74011000250 HC RX REV CODE- 250: Performed by: INTERNAL MEDICINE

## 2019-04-29 PROCEDURE — 76450000000

## 2019-04-29 PROCEDURE — 92610 EVALUATE SWALLOWING FUNCTION: CPT

## 2019-04-29 PROCEDURE — 86738 MYCOPLASMA ANTIBODY: CPT

## 2019-04-29 PROCEDURE — 93306 TTE W/DOPPLER COMPLETE: CPT

## 2019-04-29 PROCEDURE — 80053 COMPREHEN METABOLIC PANEL: CPT

## 2019-04-29 PROCEDURE — 74011000258 HC RX REV CODE- 258: Performed by: INTERNAL MEDICINE

## 2019-04-29 PROCEDURE — 82550 ASSAY OF CK (CPK): CPT

## 2019-04-29 PROCEDURE — 36415 COLL VENOUS BLD VENIPUNCTURE: CPT

## 2019-04-29 PROCEDURE — 87633 RESP VIRUS 12-25 TARGETS: CPT

## 2019-04-29 PROCEDURE — 93970 EXTREMITY STUDY: CPT

## 2019-04-29 PROCEDURE — 84100 ASSAY OF PHOSPHORUS: CPT

## 2019-04-29 RX ORDER — GUAIFENESIN 600 MG/1
600 TABLET, EXTENDED RELEASE ORAL EVERY 12 HOURS
Status: DISCONTINUED | OUTPATIENT
Start: 2019-04-29 | End: 2019-04-30

## 2019-04-29 RX ORDER — DEXTROSE, SODIUM CHLORIDE, AND POTASSIUM CHLORIDE 5; .45; .3 G/100ML; G/100ML; G/100ML
INJECTION INTRAVENOUS CONTINUOUS
Status: DISCONTINUED | OUTPATIENT
Start: 2019-04-29 | End: 2019-04-30

## 2019-04-29 RX ORDER — IPRATROPIUM BROMIDE AND ALBUTEROL SULFATE 2.5; .5 MG/3ML; MG/3ML
3 SOLUTION RESPIRATORY (INHALATION)
Status: DISCONTINUED | OUTPATIENT
Start: 2019-04-29 | End: 2019-05-02 | Stop reason: HOSPADM

## 2019-04-29 RX ORDER — LEVOFLOXACIN 5 MG/ML
750 INJECTION, SOLUTION INTRAVENOUS EVERY 24 HOURS
Status: DISCONTINUED | OUTPATIENT
Start: 2019-04-29 | End: 2019-05-01

## 2019-04-29 RX ADMIN — ENOXAPARIN SODIUM 40 MG: 40 INJECTION SUBCUTANEOUS at 00:11

## 2019-04-29 RX ADMIN — LINEZOLID 600 MG: 600 INJECTION, SOLUTION INTRAVENOUS at 11:14

## 2019-04-29 RX ADMIN — DEXTROSE MONOHYDRATE, SODIUM CHLORIDE, AND POTASSIUM CHLORIDE: 50; 4.5; 2.98 INJECTION, SOLUTION INTRAVENOUS at 08:42

## 2019-04-29 RX ADMIN — BRIMONIDINE TARTRATE 1 DROP: 2 SOLUTION/ DROPS OPHTHALMIC at 08:44

## 2019-04-29 RX ADMIN — PIPERACILLIN SODIUM,TAZOBACTAM SODIUM 3.38 G: 3; .375 INJECTION, POWDER, FOR SOLUTION INTRAVENOUS at 18:43

## 2019-04-29 RX ADMIN — DORZOLAMIDE HYDROCHLORIDE AND TIMOLOL MALEATE 1 DROP: 20; 5 SOLUTION OPHTHALMIC at 00:12

## 2019-04-29 RX ADMIN — IPRATROPIUM BROMIDE AND ALBUTEROL SULFATE 3 ML: .5; 3 SOLUTION RESPIRATORY (INHALATION) at 13:12

## 2019-04-29 RX ADMIN — LINEZOLID 600 MG: 600 INJECTION, SOLUTION INTRAVENOUS at 21:55

## 2019-04-29 RX ADMIN — LATANOPROST 1 DROP: 50 SOLUTION OPHTHALMIC at 21:48

## 2019-04-29 RX ADMIN — SODIUM CHLORIDE: 900 INJECTION, SOLUTION INTRAVENOUS at 09:12

## 2019-04-29 RX ADMIN — LATANOPROST 1 DROP: 50 SOLUTION OPHTHALMIC at 00:12

## 2019-04-29 RX ADMIN — PIPERACILLIN SODIUM,TAZOBACTAM SODIUM 3.38 G: 3; .375 INJECTION, POWDER, FOR SOLUTION INTRAVENOUS at 00:11

## 2019-04-29 RX ADMIN — Medication 10 ML: at 05:11

## 2019-04-29 RX ADMIN — PIPERACILLIN SODIUM,TAZOBACTAM SODIUM 3.38 G: 3; .375 INJECTION, POWDER, FOR SOLUTION INTRAVENOUS at 08:43

## 2019-04-29 RX ADMIN — Medication 10 ML: at 21:55

## 2019-04-29 RX ADMIN — BRIMONIDINE TARTRATE 1 DROP: 2 SOLUTION/ DROPS OPHTHALMIC at 21:47

## 2019-04-29 RX ADMIN — DORZOLAMIDE HYDROCHLORIDE AND TIMOLOL MALEATE 1 DROP: 20; 5 SOLUTION OPHTHALMIC at 21:47

## 2019-04-29 RX ADMIN — LEVOFLOXACIN 750 MG: 5 INJECTION, SOLUTION INTRAVENOUS at 21:48

## 2019-04-29 RX ADMIN — IPRATROPIUM BROMIDE AND ALBUTEROL SULFATE 3 ML: .5; 3 SOLUTION RESPIRATORY (INHALATION) at 09:06

## 2019-04-29 RX ADMIN — ENOXAPARIN SODIUM 40 MG: 40 INJECTION SUBCUTANEOUS at 21:48

## 2019-04-29 RX ADMIN — DORZOLAMIDE HYDROCHLORIDE AND TIMOLOL MALEATE 1 DROP: 20; 5 SOLUTION OPHTHALMIC at 08:44

## 2019-04-29 RX ADMIN — BRIMONIDINE TARTRATE 1 DROP: 2 SOLUTION/ DROPS OPHTHALMIC at 00:12

## 2019-04-29 NOTE — PROGRESS NOTES
Pt seen and examined. Full note to follow. No family at the bedside at my time of eval. Noted to be hypernatremic this AM on labs. Hypotonic fluids started. Pt is NPO as failed speech eval. Palliative consulted to assist with goals of care.  Pt is DNR

## 2019-04-29 NOTE — PROGRESS NOTES
Bedside and Verbal shift change report given to Marian Schofield (oncoming nurse) by Afshan Pelletier (offgoing nurse). Report included the following information SBAR, Kardex, MAR, Accordion and Recent Results.

## 2019-04-29 NOTE — PROGRESS NOTES
[address] Dosing Services: Antimicrobial Stewardship Progress Note Levofloxacin: dose adjustment per P&T approved protocol Pharmacist reviewed antibiotic appropriateness for 66year old , female  for indication of HAP. Day of Therapy: 2 Non-Kinetic Antimicrobial Dosing:  
Current Regimen:  Levofloxacin 750 mg IV every 48 hours Recommendation: CrCl 59.5 mL/min. Levofloxacin changed to 750 mg IV every 24 hours per P&T approved protocol for patient with CrCl greater than 50 mL/min. Serum Creatinine Lab Results Component Value Date/Time Creatinine 0.73 04/29/2019 03:00 AM  
 Creatinine (POC) 1.1 07/21/2016 08:58 PM  
 
   
Creatinine Clearance Estimated Creatinine Clearance: 59.5 mL/min (based on SCr of 0.73 mg/dL). Temp 98.3 °F (36.8 °C) WBC Lab Results Component Value Date/Time WBC 9.2 04/29/2019 03:00 AM  
 
   
H/H Lab Results Component Value Date/Time HGB 10.7 (L) 04/29/2019 03:00 AM  
 
  
 
Platelets Lab Results Component Value Date/Time  PLATELET 835 (L) 05/85/5685 03:00 AM  
 
  
 
Kristyn Barrera, Pharmacist

## 2019-04-29 NOTE — ACP (ADVANCE CARE PLANNING)
Advance Care Planning (ACP) Provider Note - Comprehensive      Date of ACP Conversation: 4/28/19  Persons included in Conversation:  patient   Length of ACP Conversation in minutes:  16 minutes     Authorized Decision Maker (if patient is incapable of making informed decisions): This person is: Yamilex Borges, spouse   was not available however pt's granddaughter and daughter were present. General ACP for ALL Patients with Decision Making Capacity:  Importance of advance care planning, including choosing a healthcare agent to communicate patient's healthcare decisions    Review of Existing Advance Directive:  Not available for review but family confirms DNR status (has DNR bracelet). Also confirms that  is MPOA     Active Diagnoses:   Patient Active Problem List   Diagnosis Code    Mitral regurgitation I34.0    Chronic diastolic heart failure (HCC) I50.32    Paroxysmal atrial fibrillation (HCC) I48.0    Hypertrophic cardiomyopathy (HCC) I42.2    Glaucoma H40.9    Hyperlipidemia LDL goal <70 E78.5    Cerebral atrophy G31.9    Dementia F03.90    Weakness due to cerebrovascular accident (CVA) [id number]    Goals of care, counseling/discussion Z71.89    Deep venous thrombosis (HCC) I82.409    CAD (coronary artery disease) I25.10    Dehydration E86.0    ARF (acute renal failure) (HCC) N17.9    Hx of completed stroke Z86.73    Elevated troponin R74.8    Pneumonia J18.9    UTI (urinary tract infection) N39.0       These active diagnoses are of sufficient risk that focused discussion on advance care planning is indicated in order to allow the patient to thoughtfully consider personal goals of care; and, if situations arise that prevent the ability to personally give input, to ensure appropriate representation of their personal desires for different levels and aggressiveness of care. For Serious or Chronic Illness:  Understanding of medical condition     Reviewed pt's clinical status. Mrs. Muriel Vargas has multiple medical problems including advanced dementia, prior CVA, chronic severe debility (nonverbal, non-mobile at baseline) and is being admitted for suspected aspiration PNA. We reviewed our treatment plan and anticipated discharge plans. Further, we discussed family's wishes in the scenario that if she were to suffer cardiopulmonary arrest. Family confirmed DNR status    Further, we discussed short-term and medium/long-term goals of care. In the short-term, goal is attempt at restoration of health to baseline, e.g. Treating active issues including infection/PNA/possible UTI, +/- VTE. We discussed that aspiration PNA almost certainly will recur with patients fitting Mrs. Damian's profile.  We broached the subject of hospice and will consult palliative care for further consideration and discussion     Interventions Provided:  Referral made for ACP follow-up assistance to: Palliative care  CODE STATUS: DNR

## 2019-04-29 NOTE — H&P
[facility] [address] [facility], [facility] 19 
[phone] Hospitalist Admission Note NAME:  Griffin Hubbard :   1940 MRN:  [id number] PCP:  Ngozi Pascal MD  
 
Date/Time:  2019 9:26 PM 
 
 
  
Assessment / Plan:   
  
  Pneumonia: CXR poor quality however given cough, reported fevers, rhonchi, high suspicion for PNA, possible aspiration. Diet consists of purreed food. Empiric Zosyn and Levaquin for now. 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 Add on urine culture. Check CK. Elevated troponin: suspect strain, demand ischemia. However, prior DVT and no anticoagulation tx, ddx includes VTE. CTA chest and BLE dopplers Chronic diastolic heart failure / Mitral regurgitation / Hypertrophic cardiomyopathy: gently hydrate tonight to mitigate risk of CARLIE. Monitor closely for volume overload Paroxysmal atrial fibrillation: in sinus. Not anticoagulated. Continue metoprolol. Monitor on tele CAD (coronary artery disease): not on ASA or statin. Cont BB Glaucoma: continue eye drops Cerebral atrophy / Dementia / History of CVA: nonverbal at baseline. Does not ambulate. Continue Namenda Code Status: DNR, see separate ACP note Surrogate decision maker:  Previous notes and lab results reviewed. Total time spent with patient: 50 Minutes Time spent in the care of this patient included reviewing records, discussing with nursing, obtaining history and examining the patient, and discussing treatment plans, with >50% time spent counseling/coordinating care Risk of deterioration: High Care Plan discussed with: ED provider, Patient, Family, Nursing Staff and >50% of time spent in counseling and coordination of care Discussed:  Code Status, Care Plan and D/C Planning Prophylaxis:  Lovenox Disposition:  SNF/LTC Subjective: CHIEF COMPLAINT: lethargy, fevers, cough HISTORY OF PRESENT ILLNESS:    
Ms. Marcio Tavares is a 66 y.o. female w/ hx of dementia, CVA, p-Afib, HTN, HOCM, nonverbal at baseline, resident at [address] [place] [address] who presents with lethargy. Started 3 days ago, constant, moderate, worsening, associated with cough and fevers. Pt unable to provide history of her own. ED workup showed mild leukocytosis, questionable PNA on CXR. No fevers. Lactate WNR. Ms. Marcio Tavares is admitted for further evaluation and management. Past Medical History:  
Diagnosis Date  CAD (coronary artery disease)  Carotid occlusion, left 10/25/2016  Cerebral atrophy 10/25/2016  Dementia  Depression  Glaucoma  Hx of completed stroke  Hyperlipidemia LDL goal <70 10/25/2016  Hypertension  Hypertrophic cardiomyopathy ([facility] [address] 6/1/2012  Hypertrophic subaortic stenosis (idiopathic) (HCC)  Mitral regurgitation  Paroxysmal atrial fibrillation ([facility] [address] 11/29/2011 Past Surgical History:  
Procedure Laterality Date [address]  2004 Normal cors, EF 60%, significant MR. EDP 23  
 CARDIAC SURG PROCEDURE UNLIST  ECHO 2D ADULT  8/20/2010 HCM, Resting LVOT gradient 129 mmHg, grade 3-4 diastolic dysfunction, MAC, mod-severe MR, marked LAE.  ECHO 2D ADULT  5/27/11  
 mod-severe LVH, EF 65%, grade 3-4 diastolic dysfunction, LVOT resting gradient 42 mmHg, severe LAE, mod-severe MR, PA 46 mmHg Social History Tobacco Use  Smoking status: Never Smoker  Smokeless tobacco: Never Used Substance Use Topics  Alcohol use: No  
  Alcohol/week: 0.0 oz Family History Problem Relation Age of Onset  Other Other   
     patient specifically denies the following in 1st degree relative: HTN, DM, CVA, CAD, ca  
 Heart Disease Mother No Known Allergies Prior to Admission medications Medication Sig Start Date End Date Taking? Authorizing Provider  
metoprolol tartrate (LOPRESSOR) 25 mg tablet Take 75 mg by mouth two (2) times a day. Yes Provider, Historical  
potassium chloride SR (KLOR-CON 10) 10 mEq tablet Take 20 mEq by mouth daily. Yes Provider, Historical  
loperamide (IMODIUM) 2 mg capsule Take 2 mg by mouth as needed for Diarrhea. Yes Provider, Historical  
geriatric multivitamins & minerals (ELDERTONIC) elix Take 15 mL by mouth Before breakfast, lunch, and dinner. Yes Provider, Historical  
bimatoprost (LUMIGAN) 0.03 % ophthalmic drops Administer 1 Drop to right eye nightly. Yes Provider, Historical  
brimonidine (ALPHAGAN) 0.2 % ophthalmic solution Administer 1 Drop to right eye two (2) times a day. Yes Provider, Historical  
acetaminophen (TYLENOL) 650 mg suppository Insert 650 mg into rectum every four (4) hours as needed for Fever. Yes Provider, Historical  
acetaminophen (TYLENOL) 325 mg tablet Take 325 mg by mouth every four (4) hours as needed for Pain or Fever. Yes Provider, Historical  
bumetanide (BUMEX) 0.5 mg tablet Take 0.25 mg by mouth three (3) days a week. Patient takes on Monday, Wednesday, Friday    Yes Provider, Historical  
memantine ER (NAMENDA XR) 28 mg capsule Take 1 Cap by mouth daily. 9/21/16  Yes Nico EASTON NP  
dorzolamide-timolol (COSOPT) 2-0.5 % ophthalmic solution Administer 1 Drop to right eye two (2) times a day. Yes Provider, Historical  
 
 
Review of Systems: 
(bold if positive, if negative) Limited due to baseline mentation Gen:  feverfatigueEyes:  ENT:  CVS:  Pulm:  Cough, dyspneaGI:   
:   
MS:  Skin:  Psych:  Endo:   
Hem:  Renal:   
Neuro:    
 
 
  
Objective: VITALS:   
Vital signs reviewed; most recent are: 
 
Visit Vitals /76 Pulse 69 Temp 98.3 °F (36.8 °C) Resp 18 Ht 5' 6\" (1.676 m) Wt 61.7 kg (136 lb) SpO2 98% BMI 21.95 kg/m² SpO2 Readings from Last 6 Encounters:  
04/28/19 98% 01/22/17 93% 01/03/17 97% 12/27/16 95% 11/22/16 99% 11/04/16 91% No intake or output data in the 24 hours ending 04/28/19 2126 Exam:  
 
Physical Exam: 
 
Gen:  Frail, elderly, chronically ill-appearing. lethargic HEENT:  No scleral icterus, pupils sluggish but appears reactive (not cooperative), hearing intact to voice, dry mucous membranes Neck:  Supple, without masses. Resp:  No accessory muscle use. Increased WOB. Diminished R base. Coarse breath sounds with rhonchi 
Card: HR ~100, reg rhythm. Normal S1 and S2 with 2/6 systolic murmur. No rubs or gallops. No peripheral lower extremity edema. No JVD. Peripheral pulses in tact. Abd:  Normoactive bowel sounds. Soft, non-tender, non-distended. No rebound, no guarding. No appreciable hepatosplenomegaly Musc:  No cyanosis or clubbing Skin:  No rashes or ulcers; turgor intact. Neuro:  Cranial nerves are grossly intact, no focal motor weakness. Good BUE strength, generalized BLE weakness (chronic per family), does not follow commands appropriately Psych:  No insight, normal affect. Somnolent, arousable easily. Oriented to self. Does not answer questions, nonverbal at baseline Labs: 
 
Recent Labs  
  04/28/19 
1902 WBC 11.1* HGB 12.9 HCT 40.9  Recent Labs  
  04/28/19 
1902 * K 4.1 * CO2 28 * BUN 29* CREA 1.00  
CA 8.6 ALB 2.8* SGOT 27 ALT 36 No components found for: [place] No results for input(s): PH, PCO2, PO2, HCO3, FIO2 in the last 72 hours. Recent Labs  
  04/28/19 
1902 INR 1.1 Lab Results Component Value Date/Time Specimen Description: STOOL 01/14/2014 04:00 PM  
 Specimen Description: NARES 01/13/2014 10:20 PM  
 Specimen Description: BLOOD 01/13/2014 08:10 PM  
 
Lab Results Component Value Date/Time Culture result: ENTEROCOCCUS FAECALIS GROUP D (PREDOMINATING) 
 01/15/2017 11:05 AM  
 Culture result:  01/15/2017 11:05 AM  
 **VANCOMYCIN RESISTANT ENTEROCOCCUS FAECIUM** 
(COLONY COUNT = 5,000 COLONIES/ML), KNOWN VRE Culture result:  01/15/2017 11:05 AM  
  STAPHYLOCOCCUS HAEMOLYTICUS 
(OXACILLIN RESISTANT) 
(COLONY COUNT = 3,000 COLONIES/ML) All other current labs reviewed in the computer. Imaging/Studies: CXR: There is suspicion of right lower lobe atelectasis/airspace disease. There is also increased density left base could represent atelectasis. PA and lateral views are recommended when the patient is stable or alternatively, CT can be performed. Imaging personally reviewed EKG: NSR, LVH 
EKG personally reviewed 
 
___________________________________________________ Attending Physician: Trace Lua MD

## 2019-04-29 NOTE — ROUTINE PROCESS
TRANSFER - OUT REPORT: 
 
Verbal report given to Graciela San RN(name) on Zimmerman  being transferred to ED Bed 18 (unit) for routine progression of care Report consisted of patients Situation, Background, Assessment and  
Recommendations(SBAR). Information from the following report(s) SBAR, ED Summary, [facility] and Recent Results was reviewed with the receiving nurse. Lines:  
Peripheral IV 04/28/19 (Active) Site Assessment Clean, dry, & intact 4/28/2019 10:18 PM  
Phlebitis Assessment 0 4/28/2019 10:18 PM  
Infiltration Assessment 0 4/28/2019 10:18 PM  
Dressing Status New 4/28/2019 10:18 PM  
Hub Color/Line Status Blue 4/28/2019 10:18 PM  
   
Peripheral IV 04/28/19 Left Antecubital (Active) Site Assessment Clean, dry, & intact 4/28/2019 10:19 PM  
Phlebitis Assessment 0 4/28/2019 10:19 PM  
Infiltration Assessment 0 4/28/2019 10:19 PM  
Dressing Status New 4/28/2019 10:19 PM  
Hub Color/Line Status Blue 4/28/2019 10:19 PM  
   
Peripheral IV 04/28/19 Right Forearm (Active) Site Assessment Clean, dry, & intact 4/28/2019 10:20 PM  
Phlebitis Assessment 0 4/28/2019 10:20 PM  
Infiltration Assessment 0 4/28/2019 10:20 PM  
Dressing Status New 4/28/2019 10:20 PM  
Hub Color/Line Status Pink 4/28/2019 10:20 PM  
  
 
Opportunity for questions and clarification was provided. Patient transported with: 
 Registered Nurse

## 2019-04-29 NOTE — PROGRESS NOTES
[facility]: MED RECONCILIATION Medications added:  
Eldertonic Medications removed: 
Warfarin- multiple entries Seroquel 12.5mg nightly Disopyramide 100mg BID Medications adjusted: 
Lumigan- previously 0.01% solution Brimonidine- previously 0.1% solution KCl- previously solution daily Bumex- previously 0.5mg MWF Information obtained from: Transfer papers Allergies: Patient has no known allergies. Prior to Admission Medications:  
 
Medication Documentation Review Audit Reviewed by Azael Terry., PHARMD (Pharmacist) on 04/28/19 at 5346 Medication Sig Documenting Provider Last Dose Status Taking?  
acetaminophen (TYLENOL) 325 mg tablet Take 325 mg by mouth every four (4) hours as needed for Pain or Fever. Provider, Historical  Active Yes  
acetaminophen (TYLENOL) 650 mg suppository Insert 650 mg into rectum every four (4) hours as needed for Fever. Provider, Historical  Active Yes  
bimatoprost (LUMIGAN) 0.03 % ophthalmic drops Administer 1 Drop to right eye nightly. Provider, Historical 4/27/2019 PM Active Yes  
brimonidine (ALPHAGAN) 0.2 % ophthalmic solution Administer 1 Drop to right eye two (2) times a day. Provider, Historical 4/28/2019 AM Active Yes  
bumetanide (BUMEX) 0.5 mg tablet Take 0.25 mg by mouth three (3) days a week. Patient takes on Monday, Wednesday, Friday  Provider, Historical 4/26/2019 AM Active Yes  
dorzolamide-timolol (COSOPT) 2-0.5 % ophthalmic solution Administer 1 Drop to right eye two (2) times a day. Provider, Historical 4/28/2019 AM Active Yes  
geriatric multivitamins & minerals (ELDERTONIC) elix Take 15 mL by mouth Before breakfast, lunch, and dinner. Provider, Historical 4/28/2019 afternoon Active Yes  
loperamide (IMODIUM) 2 mg capsule Take 2 mg by mouth as needed for Diarrhea. Provider, Historical  Active Yes  
memantine ER (NAMENDA XR) 28 mg capsule Take 1 Cap by mouth daily. Rickie Pickett NP 4/28/2019 AM Active Yes metoprolol tartrate (LOPRESSOR) 25 mg tablet Take 75 mg by mouth two (2) times a day. Provider, Historical 4/28/2019 AM Active Yes  
potassium chloride SR (KLOR-CON 10) 10 mEq tablet Take 20 mEq by mouth daily. Provider, Historical 4/28/2019 AM Active Yes Derek Agudelo Bolus

## 2019-04-29 NOTE — PROGRESS NOTES
Spiritual Care Assessment/Progress Note [address] 
 
 
NAME: Brandon Webb      MRN: [id number] AGE: 66 y.o. SEX: female Alevism Affiliation: Expandly  
Language: [place] 4/29/2019     Total Time (in minutes): 11 Spiritual Assessment begun in [facility] EMERGENCY DEPT through conversation with: 
  
    [x]Patient        [] Family    [] Friend(s) Reason for Consult: Palliative Care, Initial/Spiritual Assessment Spiritual beliefs per previous ntoes: (Please include comment if needed) [x] Identifies with a prem tradition:   Orthodox  
   [] Supported by a prem community:        
   [] Claims no spiritual orientation:       
   [] Seeking spiritual identity:            
   [] Adheres to an individual form of spirituality:       
   [] Not able to assess:                   
 
    
Identified resources for coping:  
   [] Prayer                           
   [] Music                  [] Guided Imagery 
   [] Family/friends                 [] Pet visits [] Devotional reading                         [] Unknown 
   [] Other:                                       
 
 
Interventions offered during this visit: (See comments for more details) Patient Interventions: Iconic (affirming the presence of God/Higher Power), Prayer (assurance of), Prayer (actual) Plan of Care: 
 
 [x] Support spiritual and/or cultural needs  
 [] Support AMD and/or advance care planning process    
 [] Support grieving process 
 [] Coordinate Rites and/or Rituals  
 [] Coordination with community clergy [] No spiritual needs identified at this time 
 [] Detailed Plan of Care below (See Comments)  [] Make referral to Music Therapy 
[] Make referral to Pet Therapy    
[] Make referral to Addiction services 
[] Make referral to [facility] 
[] Make referral to Spiritual Care Partner 
[] No future visits requested       
[x] Follow up visits as needed Comments: Initial Palliative Care spiritual assessment in the ER. Miss Melinda Chavarria made eye contact with me and after I introduced myself she said something. Could not understand what she said but affirmed that I heard her speak. Previous  notes indicate a Hoahaoism belief in God. Her eyes follow me as I went to the other side of her bed. Provided gentle spiritual presence, sang [address], and provided prayer. Miss Melinda Chavarria appeared to doze during visit. Chaplains available as able and/or needed. Visited by: Hero Varghese., MS., [facility] [address] [facility] ([id number]) Provided spiritual presence and prayer. Advised of  Availability.

## 2019-04-29 NOTE — PROGRESS NOTES
Bedside and Verbal shift change report given to Shaista Sam RN (oncoming nurse) by Joann Denton RN (offgoing nurse). Report included the following information SBAR, Kardex, ED Summary and Recent Results. Bedside and Verbal shift change report given to Primitivo Linares RN (oncoming nurse) by Shaista Sam RN (offgoing nurse). Report included the following information SBAR, Kardex, ED Summary and Recent Results.

## 2019-04-29 NOTE — CONSULTS
[facility]     Name: Kathie Nava MRN: [id number]   : 1940 Hospital: [address]   Date: 2019        Impression Plan   1. Encephalopathy  2. Urinary tract infection  3. RLL collapse due to aspiration/poor secretion mobilization  4. Chronic aspiration  5. Dementia               · Chest imaging more consistent with mucus plug due to aspiration vs. Poor secretion mobilization. It is possible that pt started as a URI/PNA and mucus production from this causing the CT chest findings vs. Aspiration along. · MBS once pt less sleepy  · Continue zosyn/levoquin  · Chest PT  · Mucinex  · Duonebs  · PNA panel  · Linezolide added for VRE UTI hx  · ? Of chronic PE but not with large occlusion or obvious chronic RV strain. Will get a echo. No need for [facility] at this time. Radiology  ( personally reviewed) CT chest showed a partially collpased RLL with severe scoliosis of the chest wall. Per radiology there is a note of old pulmonary embolism vs. LAD, however difficult to distinguish. ABG No results for input(s): PHI, PO2I, PCO2I in the last 72 hours. Subjective     Cc: lethargy    67 yo with PMHx of dementia, CVA and CAD who presented with increased lethargy from her nursing home. Pt is unable to provide further hx due to dementia and AMS. Per notes associated with cough and fevers. Pt does get fed purees at the nursing home. Currently she is on RA. CT chest showed a partially collpased RLL with severe scoliosis of the chest wall. Per radiology there is a note of old pulmonary embolism vs. LAD, however difficult to distinguish. Review of Systems:  Review of systems not obtained due to patient factors.     Past Medical History:   Diagnosis Date    CAD (coronary artery disease)     Carotid occlusion, left 10/25/2016    Cerebral atrophy 10/25/2016    Dementia     Depression     Glaucoma     Hx of completed stroke     Hyperlipidemia LDL goal <70 10/25/2016  Hypertension     Hypertrophic cardiomyopathy ([facility] [address] 6/1/2012    Hypertrophic subaortic stenosis (idiopathic) (HCC)     Mitral regurgitation     Paroxysmal atrial fibrillation ([facility] [address] 11/29/2011      Past Surgical History:   Procedure Laterality Date    CARDIAC CATHETERIZATION  2004    Normal cors, EF 60%, significant MR. EDP 23    CARDIAC SURG PROCEDURE UNLIST      ECHO 2D ADULT  8/20/2010    HCM, Resting LVOT gradient 129 mmHg, grade 3-4 diastolic dysfunction, MAC, mod-severe MR, marked LAE.  ECHO 2D ADULT  5/27/11    mod-severe LVH, EF 65%, grade 3-4 diastolic dysfunction, LVOT resting gradient 42 mmHg, severe LAE, mod-severe MR, PA 46 mmHg      Prior to Admission medications    Medication Sig Start Date End Date Taking? Authorizing Provider   metoprolol tartrate (LOPRESSOR) 25 mg tablet Take 75 mg by mouth two (2) times a day. Yes Provider, Historical   potassium chloride SR (KLOR-CON 10) 10 mEq tablet Take 20 mEq by mouth daily. Yes Provider, Historical   loperamide (IMODIUM) 2 mg capsule Take 2 mg by mouth as needed for Diarrhea. Yes Provider, Historical   geriatric multivitamins & minerals (ELDERTONIC) elix Take 15 mL by mouth Before breakfast, lunch, and dinner. Yes Provider, Historical   bimatoprost (LUMIGAN) 0.03 % ophthalmic drops Administer 1 Drop to right eye nightly. Yes Provider, Historical   brimonidine (ALPHAGAN) 0.2 % ophthalmic solution Administer 1 Drop to right eye two (2) times a day. Yes Provider, Historical   acetaminophen (TYLENOL) 650 mg suppository Insert 650 mg into rectum every four (4) hours as needed for Fever. Yes Provider, Historical   acetaminophen (TYLENOL) 325 mg tablet Take 325 mg by mouth every four (4) hours as needed for Pain or Fever. Yes Provider, Historical   bumetanide (BUMEX) 0.5 mg tablet Take 0.25 mg by mouth three (3) days a week.  Patient takes on Monday, Wednesday, Friday    Yes Provider, Historical   memantine ER (NAMENDA XR) 28 mg capsule Take 1 Cap by mouth daily. 9/21/16  Yes Silvana EASTON NP   dorzolamide-timolol (COSOPT) 2-0.5 % ophthalmic solution Administer 1 Drop to right eye two (2) times a day. Yes Provider, Historical     Current Facility-Administered Medications   Medication Dose Route Frequency    dextrose 5% - 0.45% NaCl with KCl 40 mEq/L infusion   IntraVENous CONTINUOUS    potassium phosphate 30 mmol in 0.9% sodium chloride 250 mL infusion   IntraVENous ONCE    levoFLOXacin (LEVAQUIN) 750 mg in D5W IVPB  750 mg IntraVENous Q48H    sodium chloride (NS) flush 5-40 mL  5-40 mL IntraVENous Q8H    piperacillin-tazobactam (ZOSYN) 3.375 g in 0.9% sodium chloride (MBP/ADV) 100 mL ADV  3.375 g IntraVENous Q8H    brimonidine (ALPHAGAN) 0.2 % ophthalmic solution 1 Drop  1 Drop Right Eye BID    dorzolamide-timolol (COSOPT) 22.3-6.8 mg/mL ophthalmic solution 1 Drop  1 Drop Right Eye BID    memantine ER (NAMENDA XR) capsule 28 mg  28 mg Oral DAILY    geriatric multivitamins & minerals (ELDERTONIC) oral elixir 15 mL  15 mL Oral TIDAC    metoprolol tartrate (LOPRESSOR) tablet 75 mg  75 mg Oral BID    linezolid in dextrose 5% (ZYVOX) IVPB premix in D5W 600 mg  600 mg IntraVENous Q12H    latanoprost (XALATAN) 0.005 % ophthalmic solution 1 Drop  1 Drop Right Eye QHS    enoxaparin (LOVENOX) injection 40 mg  40 mg SubCUTAneous Q24H     No Known Allergies   Social History     Tobacco Use    Smoking status: Never Smoker    Smokeless tobacco: Never Used   Substance Use Topics    Alcohol use: No     Alcohol/week: 0.0 oz      Family History   Problem Relation Age of Onset    Other Other         patient specifically denies the following in 1st degree relative: HTN, DM, CVA, CAD, ca    Heart Disease Mother           Laboratory: I have personally reviewed the critical care flowsheet and labs.      Recent Labs     04/29/19  0300 04/28/19  1902   WBC 9.2 11.1*   HGB 10.7* 12.9   HCT 34.1* 40.9   * 188     Recent Labs     04/29/19  0300 04/28/19  1902   * 147*   K 3.4* 4.1   * 116*   CO2 26 28   * 122*   BUN 24* 29*   CREA 0.73 1.00   CA 7.1* 8.6   MG 1.8  --    PHOS 1.8*  --    ALB 2.1* 2.8*   SGOT 18 27   ALT 25 36   INR  --  1.1       Objective:     Mode Rate Tidal Volume Pressure FiO2 PEEP                    Vital Signs:     TMAX(24)      Intake/Output:   Last shift:         Last 3 shifts: No intake/output data recorded. [id number] intake or output data in the 24 hours ending 04/29/19 0807  EXAM:   GENERAL: elderly, opens eyes to stimuli but does not meet eye contact.  Wet cough, HEENT:  PERRL, EOMI, no alar flaring or epistaxis, oral mucosa moist without cyanosis, NECK:  no jugular vein distention, no retractions, no thyromegaly or masses, LUNGS: rhonci and rales on right side HEART:  Regular rate and rhythm with no MGR; no edema is present, ABDOMEN:  soft with no tenderness, bowel sounds present, EXTREMITIES:  warm with no cyanosis, SKIN:  no jaundice or ecchymosis and NEUROLOGIC:  alert but not interactive    Naima Quintanilla MD  [facility] [address]

## 2019-04-29 NOTE — PROGRESS NOTES
TRANSFER - IN REPORT: 
 
Verbal report received from Shantanu Linn RN(name) on Gallito Portillo  being received from ED(unit) for routine progression of care Report consisted of patients Situation, Background, Assessment and  
Recommendations(SBAR). Information from the following report(s) SBAR, Kardex, [facility] and Recent Results was reviewed with the receiving nurse. Opportunity for questions and clarification was provided. Assessment completed upon patients arrival to unit and care assumed.

## 2019-04-29 NOTE — PROGRESS NOTES
Occupational Therapy Note: 
 
Orders received and acknowledged, spoke with PT who reports per Patient's daughter patient has been totally dependent for over two years. Patient resides at [facility] and is non-verbal and dependent for ADLs and transfers. No acute skilled OT needs at this time. Thank you Gokul Mallory OTR/L

## 2019-04-29 NOTE — CONSULTS
Palliative Medicine Consult    Patient Name: Vimal Hermosillo  YOB: 1940    Date of Initial Consult: 4/29/2019  Reason for Consult: Care decisions  Requesting Provider: Dr. Herminia Morillo  Primary Care Physician: Meseret Frances MD      SUMMARY:   Vimal Hermosillo is a 66 y.o. with a past history of stroke from October 2016, dysphasia secondary to stroke, paroxysmal A. fib, CHF, coronary disease, vascular dementia who was admitted on 4/28/2019 from [address] [place] [address] with a diagnosis of possible aspiration pneumonia, UTI. Current medical issues leading to Palliative Medicine involvement include: Care decisions. Past medical history/chart review patient is a 75-year-old female suffered a extensive stroke in October 2016. This is left her essentially bedbound, nonverbal, and on a puréed diet. She was seen by our team on several occasions but last visit was January 2017. At that time she was actually discharged home with [facility] [place]. Ultimately her family states she went to [address] [place] [address] where they attempted some rehab for about 30 days but now is essentially become a long-term care patient. She has remained nonverbal, nonambulatory, and requiring a puréed diet. According to her , he noticed some \"rattling\" in her chest and asked her to be sent to the hospital.  In the emergency room she had extensive work-up to include a CT scan of the chest that shows right lower lobe collapse/atelectasis, suspect recurrent aspiration. Speech therapy evaluation was attempted earlier today but unable to be completed secondary to mental status issues. According to family she is done okay on a puréed diet, but anytime attempts of feeding her something different were tried, she had symptoms of aspiration. Social history he is  and her  is at bedside. She has 4 adult children. PALLIATIVE DIAGNOSES:   1. Goals of care discussion  2.  Dysphasia with suspected aspiration related to a prior stroke and general debility. 3. Advanced care planning review  4. DNR discussion  5. Debility       PLAN:   1. Met with patient, , and daughter-in-law. Daughter-in-law's name is Shahid Miller. Reviewed the current medical issues and the role of palliative medicine. Discussed are concerned about her dysphasia and likely having significant issues with aspiration. Discussed the findings of her CT scan. They have not had any discussions about a feeding tube prior to our discussion today. Patient apparently has been fairly unchanged neurologically over the last 2 years. 2. Goals of care continue current therapy. She is a long-term care resident for at [facility]. We then discussed the possibility that she may not be able to eat a puréed diet and would they want to consider a PEG tube. We discussed somewhat the risk and benefits of feeding tube. Reviewed that it does not eliminate all aspiration. They do admit that she tends to pull at things and have some concerns that she could pull out her PEG tube. Explained that no decision has been made today and that speech therapy will be reevaluated tomorrow and considering a modified barium swallow. 3. Advance care planning patient has not completed an advanced medical directive and her  understands that he would service her medical power of . 4. CODE STATUS reviewed resuscitation and what CPR would entail. The patient's  had already signed a DO NOT RESUSCITATE form in 2016. He confirms those wishes today. 5. Symptom managementno acute symptoms for us to manage at this time  6. Psychosocial patient with excellent support from family. No spiritual concerns. 7. Discussed with bedside nurse  8. Initial consult note routed to primary continuity provider  9.  Communicated plan of care with: Palliative IDT       GOALS OF CARE / TREATMENT PREFERENCES:   [====Goals of Care====]  GOALS OF CARE:  Patient/Health Care Proxy Stated Goals: Other (comment)(Having to debate possible PEG)      TREATMENT PREFERENCES:   Code Status: DNR    Advance Care Planning:  Advance Care Planning 1/10/2017   Patient's Healthcare Decision Maker is: Legal Next Lizet Montejo   Primary Decision Maker Name Philip Barrera   Primary Decision Maker Phone Number [phone]   Primary Decision Maker Relationship to Patient Spouse   Secondary Decision Maker Name -   Secondary Decision Maker Phone Number -   Secondary Decision Maker Relationship to Patient -   Confirm Advance Directive -   Patient Would Like to Complete Advance Directive -   Does the patient have other document types Do Not Resuscitate       Medical Interventions: Limited additional interventions  Other Instructions: The palliative care team has discussed with patient / health care proxy about goals of care / treatment preferences for patient.  [====Goals of Care====]         HISTORY:     History obtained from: Chart, , daughter-in-law    CHIEF COMPLAINT: Right Sola    HPI/SUBJECTIVE:    The patient is:   [] Verbal and participatory  [x] Non-participatory due to: Stroke      Clinical Pain Assessment (nonverbal scale for severity on nonverbal patients):   Clinical Pain Assessment  Severity: 0    Adult Nonverbal Pain Scale  Face: No particular expression or smile  Activity (Movement): Lying quietly, normal position  Guarding: Lying quietly, no positioning of hands over areas of body  Physiology (Vital Signs):  Stable vital signs  Respiratory: Baseline RR/SpO2 compliant with ventilator  Total Score: 0       FUNCTIONAL ASSESSMENT:     Palliative Performance Scale (PPS):  PPS: 40       PSYCHOSOCIAL/SPIRITUAL SCREENING:     Advance Care Planning:  Advance Care Planning 1/10/2017   Patient's Healthcare Decision Maker is: Legal Next of Little Montejo   Primary Decision Maker Name Philip Barrera   Primary Decision Maker Phone Number [phone]   Primary Decision Maker Relationship to Patient Spouse   Secondary Decision Maker Name -   Secondary Decision Maker Phone Number -   Secondary Decision Maker Relationship to Patient -   Confirm Advance Directive -   Patient Would Like to Complete Advance Directive -   Does the patient have other document types Do Not Resuscitate        Any spiritual / Orthodox concerns:  [] Yes /  [x] No    Caregiver Burnout:  [] Yes /  [x] No /  [] No Caregiver Present      Anticipatory grief assessment:   [x] Normal  / [] Maladaptive       ESAS Anxiety: Anxiety: 0    ESAS Depression: Depression: 0        REVIEW OF SYSTEMS:     Positive and pertinent negative findings in ROS are noted above in HPI. The following systems were [x] reviewed / [] unable to be reviewed as noted in HPI  Other findings are noted below. Systems: constitutional, ears/nose/mouth/throat, respiratory, gastrointestinal, genitourinary, musculoskeletal, integumentary, neurologic, psychiatric, endocrine. Positive findings noted below. Modified ESAS Completed by: provider   Fatigue: 3 Drowsiness: 1   Depression: 0 Pain: 0   Anxiety: 0 Nausea: 0   Anorexia: 2 Dyspnea: 3     Constipation: No              PHYSICAL EXAM:     From RN flowsheet:  Wt Readings from Last 3 Encounters:   04/29/19 135 lb (61.2 kg)   01/09/17 136 lb (61.7 kg)   01/03/17 150 lb (68 kg)     Blood pressure 132/76, pulse 75, temperature 98.7 °F (37.1 °C), resp. rate 20, height 5' 6\" (1.676 m), weight 135 lb (61.2 kg), SpO2 95 %. Pain Scale 1: Adult Nonverbal Pain Scale                    Last bowel movement, if known:     Constitutional: Ill-appearing, nonverbal  Eyes: pupils equal, anicteric  ENMT: no nasal discharge, moist mucous membranes  Cardiovascular: regular rhythm, distal pulses intact  Respiratory: breathing slightly labored, decreased at the right base, secretions noted.   Gastrointestinal: soft non-tender, +bowel sounds  Musculoskeletal: no deformity, no tenderness to palpation  Skin: warm, dry  Neurologic: Not following commands  Psychiatric: Nonverbal  Other:       HISTORY:     Active Problems:    Mitral regurgitation (10/22/2010)      Chronic diastolic heart failure (HCC) (2/26/2011)      Paroxysmal atrial fibrillation ([address] (11/29/2011)      Hypertrophic cardiomyopathy ([address] (6/1/2012)      Glaucoma (1/13/2014)      Cerebral atrophy (10/25/2016)      Dementia (10/25/2016)      CAD (coronary artery disease) ()      Hx of completed stroke (4/28/2019)      Elevated troponin (4/28/2019)      Pneumonia (4/28/2019)      UTI (urinary tract infection) (4/28/2019)      Past Medical History:   Diagnosis Date    CAD (coronary artery disease)     Carotid occlusion, left 10/25/2016    Cerebral atrophy 10/25/2016    Dementia     Depression     Glaucoma     Hx of completed stroke     Hyperlipidemia LDL goal <70 10/25/2016    Hypertension     Hypertrophic cardiomyopathy ([address] 6/1/2012    Hypertrophic subaortic stenosis (idiopathic) (HCC)     Mitral regurgitation     Paroxysmal atrial fibrillation ([address] 11/29/2011      Past Surgical History:   Procedure Laterality Date    CARDIAC CATHETERIZATION  2004    Normal cors, EF 60%, significant MR. EDP 23    CARDIAC SURG PROCEDURE UNLIST      ECHO 2D ADULT  8/20/2010    HCM, Resting LVOT gradient 129 mmHg, grade 3-4 diastolic dysfunction, MAC, mod-severe MR, marked LAE.  ECHO 2D ADULT  5/27/11    mod-severe LVH, EF 65%, grade 3-4 diastolic dysfunction, LVOT resting gradient 42 mmHg, severe LAE, mod-severe MR, PA 46 mmHg      Family History   Problem Relation Age of Onset    Other Other         patient specifically denies the following in 1st degree relative: HTN, DM, CVA, CAD, ca    Heart Disease Mother       History reviewed, no pertinent family history.   Social History     Tobacco Use    Smoking status: Never Smoker    Smokeless tobacco: Never Used   Substance Use Topics    Alcohol use: No     Alcohol/week: 0.0 oz     No Known Allergies   Current Facility-Administered Medications   Medication Dose Route Frequency    dextrose 5% - 0.45% NaCl with KCl 40 mEq/L infusion   IntraVENous CONTINUOUS    levoFLOXacin (LEVAQUIN) 750 mg in D5W IVPB  750 mg IntraVENous Q24H    guaiFENesin ER (MUCINEX) tablet 600 mg  600 mg Oral Q12H    albuterol-ipratropium (DUO-NEB) 2.5 MG-0.5 MG/3 ML  3 mL Nebulization Q6H RT    sodium chloride (NS) flush 5-40 mL  5-40 mL IntraVENous Q8H    sodium chloride (NS) flush 5-40 mL  5-40 mL IntraVENous PRN    acetaminophen (TYLENOL) tablet 650 mg  650 mg Oral Q6H PRN    naloxone (NARCAN) injection 0.4 mg  0.4 mg IntraVENous PRN    piperacillin-tazobactam (ZOSYN) 3.375 g in 0.9% sodium chloride (MBP/ADV) 100 mL ADV  3.375 g IntraVENous Q8H    brimonidine (ALPHAGAN) 0.2 % ophthalmic solution 1 Drop  1 Drop Right Eye BID    dorzolamide-timolol (COSOPT) 22.3-6.8 mg/mL ophthalmic solution 1 Drop  1 Drop Right Eye BID    memantine ER (NAMENDA XR) capsule 28 mg  28 mg Oral DAILY    geriatric multivitamins & minerals (ELDERTONIC) oral elixir 15 mL  15 mL Oral TIDAC    metoprolol tartrate (LOPRESSOR) tablet 75 mg  75 mg Oral BID    linezolid in dextrose 5% (ZYVOX) IVPB premix in D5W 600 mg  600 mg IntraVENous Q12H    latanoprost (XALATAN) 0.005 % ophthalmic solution 1 Drop  1 Drop Right Eye QHS    enoxaparin (LOVENOX) injection 40 mg  40 mg SubCUTAneous Q24H     Current Outpatient Medications   Medication Sig    metoprolol tartrate (LOPRESSOR) 25 mg tablet Take 75 mg by mouth two (2) times a day.  potassium chloride SR (KLOR-CON 10) 10 mEq tablet Take 20 mEq by mouth daily.  loperamide (IMODIUM) 2 mg capsule Take 2 mg by mouth as needed for Diarrhea.  geriatric multivitamins & minerals (ELDERTONIC) elix Take 15 mL by mouth Before breakfast, lunch, and dinner.  bimatoprost (LUMIGAN) 0.03 % ophthalmic drops Administer 1 Drop to right eye nightly.  brimonidine (ALPHAGAN) 0.2 % ophthalmic solution Administer 1 Drop to right eye two (2) times a day.     acetaminophen (TYLENOL) 650 mg suppository Insert 650 mg into rectum every four (4) hours as needed for Fever.  acetaminophen (TYLENOL) 325 mg tablet Take 325 mg by mouth every four (4) hours as needed for Pain or Fever.  bumetanide (BUMEX) 0.5 mg tablet Take 0.25 mg by mouth three (3) days a week. Patient takes on Monday, Wednesday, Friday     memantine ER (NAMENDA XR) 28 mg capsule Take 1 Cap by mouth daily.  dorzolamide-timolol (COSOPT) 2-0.5 % ophthalmic solution Administer 1 Drop to right eye two (2) times a day. LAB AND IMAGING FINDINGS:     Lab Results   Component Value Date/Time    WBC 9.2 04/29/2019 03:00 AM    HGB 10.7 (L) 04/29/2019 03:00 AM    PLATELET 399 (L) 48/02/2929 03:00 AM     Lab Results   Component Value Date/Time    Sodium 147 (H) 04/29/2019 03:00 AM    Potassium 3.4 (L) 04/29/2019 03:00 AM    Chloride 118 (H) 04/29/2019 03:00 AM    CO2 26 04/29/2019 03:00 AM    BUN 24 (H) 04/29/2019 03:00 AM    Creatinine 0.73 04/29/2019 03:00 AM    Calcium 7.1 (L) 04/29/2019 03:00 AM    Magnesium 1.8 04/29/2019 03:00 AM    Phosphorus 1.8 (L) 04/29/2019 03:00 AM      Lab Results   Component Value Date/Time    AST (SGOT) 18 04/29/2019 03:00 AM    Alk.  phosphatase 51 04/29/2019 03:00 AM    Protein, total 5.4 (L) 04/29/2019 03:00 AM    Albumin 2.1 (L) 04/29/2019 03:00 AM    Globulin 3.3 04/29/2019 03:00 AM     Lab Results   Component Value Date/Time    INR 1.1 04/28/2019 07:02 PM    Prothrombin time 10.8 04/28/2019 07:02 PM    aPTT 23.1 04/28/2019 07:02 PM      Lab Results   Component Value Date/Time    Iron 49 01/15/2014 11:45 AM    TIBC 252 01/15/2014 11:45 AM    Iron % saturation 19 (L) 01/15/2014 11:45 AM    Ferritin 84 01/15/2014 11:45 AM      No results found for: PH, PCO2, PO2  No components found for: [place]   Lab Results   Component Value Date/Time    CK 30 04/29/2019 03:00 AM    CK - MB 1.1 04/29/2019 03:00 AM                Total time: 50  Counseling / coordination time, spent as noted above: 45  > 50% counseling / coordination?: yes    Prolonged service was provided for  []30 min   []75 min in face to face time in the presence of the patient, spent as noted above. Time Start:   Time End:   Note: this can only be billed with 61400 (initial) or 80140 (follow up). If multiple start / stop times, list each separately.

## 2019-04-29 NOTE — PROGRESS NOTES
Per Pt's daughter, Pt has been totally dependent for over two year. Pt resides at [facility] and is non-verbal and totally dependent for ADL's and transfers. Pt is not appropriate for skilled PT and orders will be completed.

## 2019-04-29 NOTE — PROGRESS NOTES
Bedside shift change report given to Mirtha (oncoming nurse) by Daniel Valdez (offgoing nurse). Report included the following information SBAR, Kardex, MAR and Recent Results.

## 2019-04-29 NOTE — PROGRESS NOTES
RN asked SLP to hold at this time due to AMS, congestion and findings of chest CT: IMPRESSION: 
  
1. No acute pulmonary embolus. 2. Extensive right lower lobe atelectasis and mild left lower lobe atelectasis, 
with trace pleural effusions. Extensive fluid/debris within the bilateral 
Airways. Pulmonary recommend MBS when patient more alert. Will f/u this afternoon for mental status check. NPO for now, including meds.

## 2019-04-29 NOTE — PROGRESS NOTES
Problem: Dysphagia (Adult) Goal: *Acute Goals and Plan of Care (Insert Text) Description Swallowing goals initiated 4-29-19:  
1) MBS when more alert and receptive to PO trials. Outcome: Not Met SPEECH LANGUAGE PATHOLOGY BEDSIDE SWALLOW EVALUATION Patient: Vanessa Trinidad (36 y.o. female) Date: 4/29/2019 Primary Diagnosis: Pneumonia [J18.9] Precautions: aspiration ASSESSMENT : 
Based on the objective data described below, the patient is not ready for any PO, including meds. She is not alert enough, is not understanding/processing spoon offerings for PO and has Chest CT findings of possible aspiration ADmitted with fever, cough, PNA 
PMH: on purees and ? Consistency of liquids at SNF. Nonverbal and does not follow commands premorbidly. Dementia, CVA, cerebral atrophy, a-fib, CM, CAD, HTN, cardiac cath, glaucoma. Patient will benefit from skilled intervention to address the above impairments. Patient?s rehabilitation potential is considered to be Guarded Factors which may influence rehabilitation potential include: ? None noted ? Mental ability/status ? Medical condition ? Home/family situation and support systems ? Safety awareness ? Pain tolerance/management ? Other: PLAN : 
Recommendations and Planned Interventions: 
NPO including meds. MBS when more alert and understanding to open mouth for PO trials. Continued discussion with MD about goals of care Frequency/Duration: Patient will be followed by speech-language pathology 2 times a week to address goals. Discharge Recommendations: To Be Determined SUBJECTIVE:  
Patient nonverbal. Head in hyperextended position. ?. 
 
OBJECTIVE:  
 
Past Medical History:  
Diagnosis Date CAD (coronary artery disease) Carotid occlusion, left 10/25/2016 Cerebral atrophy 10/25/2016 Dementia Depression Glaucoma Hx of completed stroke Hyperlipidemia LDL goal <70 10/25/2016 Hypertension Hy[facility] [address] 6/1/2012 Hypertrophic subaortic stenosis (idiopathic) (HCC) Mitral regurgitation Paroxysmal atrial fibrillation ([facility] 11/29/2011 Past Surgical History:  
Procedure Laterality Date CARDIAC CATHETERIZATION  2004 Normal cors, EF 60%, significant MR. EDP 23 CARDIAC SURG PROCEDURE UNLIST    
 ECHO 2D ADULT  8/20/2010 HCM, Resting LVOT gradient 129 mmHg, grade 3-4 diastolic dysfunction, MAC, mod-severe MR, marked LAE.   
 ECHO 2D ADULT  5/27/11  
 mod-severe LVH, EF 65%, grade 3-4 diastolic dysfunction, LVOT resting gradient 42 mmHg, severe LAE, mod-severe MR, PA 46 mmHg Prior Level of Function/Home Situation: SNF Diet prior to admission: purees and ? Consistently of liquids. Current Diet:  NPO x meds. Cognitive and Communication Status: 
Neurologic State: Eyes open to stimulus Orientation Level: Unable to verbalize Cognition: No command following Perception: (difficult to assess. pateint with glaucoma) Perseveration: No perseveration noted Safety/Judgement: Lack of insight into deficits Oral Assessment: 
Oral Assessment Labial: (constant jaw clenching, difficulty opening mouth) Dentition: Natural;Limited Oral Hygiene: dry and sticky; coating on tongue Lingual: (difficulty with visualization. dried secretions on front of tongue) Velum: Unable to visualize Mandible: No impairment Prognathic jaw P.O. Trials: 
Patient Position: upright in bed Vocal quality prior to P.O.: Aphonic ORAL PHASE:  
How Presented: Arun Brizuela Presented dry spoon. Patient did not open mouth with visual, verbal or tactile cue 
SLP attempted to open patient's mouth. SHe was resistive and SLP could only see front of tongue and lower teeth. PHARYNGEAL PHASE:  
Unable to assess pharyngeal phase due to severity of oral issues. Patient apparently on purees at SNF,but this information was NOT available in hard chart. She has chest CT that may indicate aspiration:  
  
 IMPRESSION IMPRESSION: 
  
1. No acute pulmonary embolus. 2. Extensive right lower lobe atelectasis and mild left lower lobe atelectasis, 
with trace pleural effusions. Extensive fluid/debris within the bilateral 
airways. NOMS:  
The NOMS functional outcome measure was used to quantify this patient's level of swallowing impairment. Based on the NOMS, the patient was determined to be at level 1 for swallow function NOMS Swallowing Levels: 
Level 1 (CN): NPO Level 2 (CM): NPO but takes consistency in therapy Level 3 (CL): Takes less than 50% of nutrition p.o. and continues with nonoral feedings; and/or safe with mod cues; and/or max diet restriction Level 4 (CK): Safe swallow but needs mod cues; and/or mod diet restriction; and/or still requires some nonoral feeding/supplements Level 5 (CJ): Safe swallow with min diet restriction; and/or needs min cues Level 6 (CI): Independent with p.o.; rare cues; usually self cues; may need to avoid some foods or needs extra time Level 7 (CH): Independent for all p.o. DENZEL. (2003). National Outcomes Measurement System (NOMS): Adult Speech-Language Pathology User's Guide. Pain: 
Pain Scale 1: Adult Nonverbal Pain Scale After treatment:  
?            Patient left in no apparent distress sitting up in chair ? Patient left in no apparent distress in bed ? Call bell left within reach ? Nursing notified ? Caregiver present ? Bed alarm activated COMMUNICATION/EDUCATION:  
The patient?s plan of care including recommendations, planned interventions, and recommended diet changes were discussed with: Registered Nurse.  
 
Patient was educated regarding Her deficit(s) of dysphagia  as this relates to Her diagnosis of PNA, CVA, dementia. She demonstrated Guarded understanding as evidenced by lack of response. ? Posted safety precautions in patient's room. ? Patient/family have participated as able in goal setting and plan of care. ?            Patient/family agree to work toward stated goals and plan of care. ?            Patient understands intent and goals of therapy, but is neutral about his/her participation. ? Patient is unable to participate in goal setting and plan of care. Thank you for this referral. 
Josselyn Duarte, SLP Time Calculation: 10 mins

## 2019-04-29 NOTE — PROGRESS NOTES
4/29/2019 
11:05 AM 
Reason for Admission:   PNA/ UTI/Elevated Troponin Pt is a 65 yo AA female resident at [facility] w/ history of dementia,, admitted w/ PNA w/ cough,fevers,lethargy,  possible aspiration,  Pt has been admitted for medical management. RRAT Score:     20 Resources/supports as identified by patient/family:   Pt resides at [facility] for last 2 years, receives care for all ADLs,  is supportive and visit wife daily assists in feeding her. Top Challenges facing patient (as identified by patient/family and CM): Finances/Medication cost?    Pt has [employer] [facility], all Rx filled through LT facility Transportation?   provided by [facility] facility, pt has Medicaid Support system or lack thereof? Pt has supportive family, resides in [facility] facility Living arrangements? Pt resides at [facility] Self-care/ADLs/Cognition? Pt has history of dementia, receives full  assistance w/ all ADLs, pt is non-ambulatory, adela-chair when OOB Current Advanced Directive/Advance Care Plan:  Pt has DNR on file, no ACP,  Bri Talbot (C) [phone] is surrogate decision maker Plan for utilizing home health:    No history,  PT/OT evals are pending Likelihood of readmission:   High/Red Transition of Care Plan:              [address] admission for medical management 2. PT/OT/Speech evals pending 3. Palliative consult for GOC, likely comfort care 4. D/C when stable to [place] [id number], will need transport CM placed TC to pt's  Bri Talbot ([employer]) [phone] or (C) [phone] to begin d/c planning, pt resides at [facility] for last 2 yrs where she has received care for all ADLs including feeding. PCP: Roman Perez MD, no NN 
HH: None DME: pt uses [facility] when [employer] Rx: filled through LTC facility Care Management Interventions PCP Verified by CM: Tracy Fitzgerald MD at LTC facility, sees pt monthly) Palliative Care Criteria Met (RRAT>21 & CHF Dx)?: Yes 
Palliative Consult Recommended?: Yes Mode of Transport at Discharge: BLS Physical Therapy Consult: Yes Occupational Therapy Consult: Yes Speech Therapy Consult: Yes Current Support Network: Nursing Facility([employer] [facility] [address] [place]) Confirm Follow Up Transport: Other (see comment)(LTC facility) Plan discussed with Pt/Family/Caregiver: Yes Discharge Location Discharge Placement: [address]) Referral sent in [employer] to [facility] for return when stable Palliative team to meet w/  to determine [address], likely comfort care at d/c Pt will need  Stretcher transport at d/c Floor CM to follow and assist.. Melvina Owen

## 2019-04-29 NOTE — PROGRESS NOTES
TRANSFER - OUT REPORT: 
 
Verbal report given to Castillo(name) on Vee Nagy  being transferred to 5th Floor (unit) for routine progression of care Report consisted of patients Situation, Background, Assessment and  
Recommendations(SBAR). Information from the following report(s) SBAR, Kardex, ED Summary, Intake/Output, MAR, Recent Results and Cardiac Rhythm NSR BBB was reviewed with the receiving nurse. Lines:  
Peripheral IV 04/28/19 Hand (Active) Site Assessment Clean, dry, & intact 4/29/2019  8:45 AM  
Phlebitis Assessment 0 4/29/2019  8:45 AM  
Infiltration Assessment 0 4/29/2019  8:45 AM  
Dressing Status Clean, dry, & intact 4/29/2019  8:45 AM  
Dressing Type Transparent;Tape 4/29/2019  8:45 AM  
Hub Color/Line Status Blue 4/29/2019  8:45 AM  
   
Peripheral IV 04/28/19 Left Antecubital (Active) Site Assessment Clean, dry, & intact 4/29/2019  8:45 AM  
Phlebitis Assessment 0 4/29/2019  8:45 AM  
Infiltration Assessment 0 4/29/2019  8:45 AM  
Dressing Status Clean, dry, & intact 4/29/2019  8:45 AM  
Dressing Type Transparent;Tape 4/29/2019  8:45 AM  
Hub Color/Line Status Blue 4/29/2019  8:45 AM  
   
Peripheral IV 04/28/19 Right Forearm (Active) Site Assessment Clean, dry, & intact 4/29/2019  8:45 AM  
Phlebitis Assessment 0 4/29/2019  8:45 AM  
Infiltration Assessment 0 4/29/2019  8:45 AM  
Dressing Status Clean, dry, & intact 4/29/2019  8:45 AM  
Dressing Type Transparent 4/29/2019  8:45 AM  
Hub Color/Line Status Pink 4/29/2019  8:45 AM  
Action Taken Open ports on tubing capped 4/29/2019  8:45 AM  
Alcohol Cap Used Yes 4/29/2019  8:45 AM  
  
 
Opportunity for questions and clarification was provided. Patient transported with: 
 Registered Nurse

## 2019-04-30 LAB
ANION GAP SERPL CALC-SCNC: 3 MMOL/L (ref 5–15)
ANION GAP SERPL CALC-SCNC: 4 MMOL/L (ref 5–15)
BASOPHILS # BLD: 0 K/UL (ref 0–0.1)
BASOPHILS # BLD: 0 K/UL (ref 0–0.1)
BASOPHILS NFR BLD: 0 % (ref 0–1)
BASOPHILS NFR BLD: 0 % (ref 0–1)
BNP SERPL-MCNC: 8241 PG/ML
BUN SERPL-MCNC: 19 MG/DL (ref 6–20)
BUN SERPL-MCNC: 19 MG/DL (ref 6–20)
BUN/CREAT SERPL: 20 (ref 12–20)
BUN/CREAT SERPL: 20 (ref 12–20)
CALCIUM SERPL-MCNC: 7.9 MG/DL (ref 8.5–10.1)
CALCIUM SERPL-MCNC: 8.5 MG/DL (ref 8.5–10.1)
CHLORIDE SERPL-SCNC: 112 MMOL/L (ref 97–108)
CHLORIDE SERPL-SCNC: 113 MMOL/L (ref 97–108)
CK MB CFR SERPL CALC: 6 % (ref 0–2.5)
CK MB CFR SERPL CALC: 6.1 % (ref 0–2.5)
CK MB SERPL-MCNC: 1.7 NG/ML (ref 5–25)
CK MB SERPL-MCNC: 1.8 NG/ML (ref 5–25)
CK SERPL-CCNC: 28 U/L (ref 26–192)
CK SERPL-CCNC: 30 U/L (ref 26–192)
CO2 SERPL-SCNC: 26 MMOL/L (ref 21–32)
CO2 SERPL-SCNC: 29 MMOL/L (ref 21–32)
CREAT SERPL-MCNC: 0.96 MG/DL (ref 0.55–1.02)
CREAT SERPL-MCNC: 0.96 MG/DL (ref 0.55–1.02)
DIFFERENTIAL METHOD BLD: ABNORMAL
DIFFERENTIAL METHOD BLD: ABNORMAL
EOSINOPHIL # BLD: 0.1 K/UL (ref 0–0.4)
EOSINOPHIL # BLD: 0.1 K/UL (ref 0–0.4)
EOSINOPHIL NFR BLD: 1 % (ref 0–7)
EOSINOPHIL NFR BLD: 1 % (ref 0–7)
ERYTHROCYTE [DISTWIDTH] IN BLOOD BY AUTOMATED COUNT: 11.9 % (ref 11.5–14.5)
ERYTHROCYTE [DISTWIDTH] IN BLOOD BY AUTOMATED COUNT: 12 % (ref 11.5–14.5)
GLUCOSE SERPL-MCNC: 133 MG/DL (ref 65–100)
GLUCOSE SERPL-MCNC: 237 MG/DL (ref 65–100)
HCT VFR BLD AUTO: 39.2 % (ref 35–47)
HCT VFR BLD AUTO: 40 % (ref 35–47)
HGB BLD-MCNC: 12.1 G/DL (ref 11.5–16)
HGB BLD-MCNC: 12.4 G/DL (ref 11.5–16)
IMM GRANULOCYTES # BLD AUTO: 0 K/UL (ref 0–0.04)
IMM GRANULOCYTES # BLD AUTO: 0 K/UL (ref 0–0.04)
IMM GRANULOCYTES NFR BLD AUTO: 0 % (ref 0–0.5)
IMM GRANULOCYTES NFR BLD AUTO: 0 % (ref 0–0.5)
LYMPHOCYTES # BLD: 1.2 K/UL (ref 0.8–3.5)
LYMPHOCYTES # BLD: 1.3 K/UL (ref 0.8–3.5)
LYMPHOCYTES NFR BLD: 13 % (ref 12–49)
LYMPHOCYTES NFR BLD: 15 % (ref 12–49)
M PNEUMO IGG SER IA-ACNC: 480 U/ML (ref 0–99)
M PNEUMO IGM SER IA-ACNC: <770 U/ML (ref 0–769)
MAGNESIUM SERPL-MCNC: 1.9 MG/DL (ref 1.6–2.4)
MAGNESIUM SERPL-MCNC: 2 MG/DL (ref 1.6–2.4)
MCH RBC QN AUTO: 31.3 PG (ref 26–34)
MCH RBC QN AUTO: 31.8 PG (ref 26–34)
MCHC RBC AUTO-ENTMCNC: 30.9 G/DL (ref 30–36.5)
MCHC RBC AUTO-ENTMCNC: 31 G/DL (ref 30–36.5)
MCV RBC AUTO: 101.6 FL (ref 80–99)
MCV RBC AUTO: 102.6 FL (ref 80–99)
MONOCYTES # BLD: 0.5 K/UL (ref 0–1)
MONOCYTES # BLD: 0.6 K/UL (ref 0–1)
MONOCYTES NFR BLD: 6 % (ref 5–13)
MONOCYTES NFR BLD: 6 % (ref 5–13)
NEUTS SEG # BLD: 6.9 K/UL (ref 1.8–8)
NEUTS SEG # BLD: 7.2 K/UL (ref 1.8–8)
NEUTS SEG NFR BLD: 78 % (ref 32–75)
NEUTS SEG NFR BLD: 80 % (ref 32–75)
NRBC # BLD: 0 K/UL (ref 0–0.01)
NRBC # BLD: 0 K/UL (ref 0–0.01)
NRBC BLD-RTO: 0 PER 100 WBC
NRBC BLD-RTO: 0 PER 100 WBC
PLATELET # BLD AUTO: 174 K/UL (ref 150–400)
PLATELET # BLD AUTO: 185 K/UL (ref 150–400)
PMV BLD AUTO: 12.7 FL (ref 8.9–12.9)
PMV BLD AUTO: 13 FL (ref 8.9–12.9)
POTASSIUM SERPL-SCNC: 5 MMOL/L (ref 3.5–5.1)
POTASSIUM SERPL-SCNC: 5.9 MMOL/L (ref 3.5–5.1)
RBC # BLD AUTO: 3.86 M/UL (ref 3.8–5.2)
RBC # BLD AUTO: 3.9 M/UL (ref 3.8–5.2)
SODIUM SERPL-SCNC: 142 MMOL/L (ref 136–145)
SODIUM SERPL-SCNC: 145 MMOL/L (ref 136–145)
TROPONIN I SERPL-MCNC: 0.07 NG/ML
TROPONIN I SERPL-MCNC: 0.07 NG/ML
WBC # BLD AUTO: 8.8 K/UL (ref 3.6–11)
WBC # BLD AUTO: 9.1 K/UL (ref 3.6–11)

## 2019-04-30 PROCEDURE — 94640 AIRWAY INHALATION TREATMENT: CPT

## 2019-04-30 PROCEDURE — 77030038269 HC DRN EXT URIN PURWCK BARD -A

## 2019-04-30 PROCEDURE — 94760 N-INVAS EAR/PLS OXIMETRY 1: CPT

## 2019-04-30 PROCEDURE — 74011250636 HC RX REV CODE- 250/636: Performed by: INTERNAL MEDICINE

## 2019-04-30 PROCEDURE — 94668 MNPJ CHEST WALL SBSQ: CPT

## 2019-04-30 PROCEDURE — 94667 MNPJ CHEST WALL 1ST: CPT

## 2019-04-30 PROCEDURE — 82550 ASSAY OF CK (CPK): CPT

## 2019-04-30 PROCEDURE — 65660000000 HC RM CCU STEPDOWN

## 2019-04-30 PROCEDURE — 83880 ASSAY OF NATRIURETIC PEPTIDE: CPT

## 2019-04-30 PROCEDURE — 84484 ASSAY OF TROPONIN QUANT: CPT

## 2019-04-30 PROCEDURE — 85025 COMPLETE CBC W/AUTO DIFF WBC: CPT

## 2019-04-30 PROCEDURE — 83735 ASSAY OF MAGNESIUM: CPT

## 2019-04-30 PROCEDURE — 36415 COLL VENOUS BLD VENIPUNCTURE: CPT

## 2019-04-30 PROCEDURE — 74011000250 HC RX REV CODE- 250: Performed by: INTERNAL MEDICINE

## 2019-04-30 PROCEDURE — 92526 ORAL FUNCTION THERAPY: CPT

## 2019-04-30 PROCEDURE — 80048 BASIC METABOLIC PNL TOTAL CA: CPT

## 2019-04-30 PROCEDURE — 77030033269 HC SLV COMPR SCD KNE2 CARD -B

## 2019-04-30 PROCEDURE — 74011000258 HC RX REV CODE- 258: Performed by: INTERNAL MEDICINE

## 2019-04-30 RX ORDER — HYDRALAZINE HYDROCHLORIDE 20 MG/ML
10 INJECTION INTRAMUSCULAR; INTRAVENOUS
Status: DISCONTINUED | OUTPATIENT
Start: 2019-04-30 | End: 2019-05-02 | Stop reason: HOSPADM

## 2019-04-30 RX ORDER — GUAIFENESIN 100 MG/5ML
100 SOLUTION ORAL
Status: DISCONTINUED | OUTPATIENT
Start: 2019-04-30 | End: 2019-05-02 | Stop reason: HOSPADM

## 2019-04-30 RX ORDER — ONDANSETRON 2 MG/ML
4 INJECTION INTRAMUSCULAR; INTRAVENOUS
Status: DISCONTINUED | OUTPATIENT
Start: 2019-04-30 | End: 2019-05-02 | Stop reason: HOSPADM

## 2019-04-30 RX ORDER — DEXTROSE MONOHYDRATE AND SODIUM CHLORIDE 5; .45 G/100ML; G/100ML
75 INJECTION, SOLUTION INTRAVENOUS CONTINUOUS
Status: DISCONTINUED | OUTPATIENT
Start: 2019-04-30 | End: 2019-05-02 | Stop reason: HOSPADM

## 2019-04-30 RX ORDER — MORPHINE SULFATE 4 MG/ML
1 INJECTION INTRAVENOUS
Status: DISCONTINUED | OUTPATIENT
Start: 2019-04-30 | End: 2019-05-02 | Stop reason: HOSPADM

## 2019-04-30 RX ADMIN — ENOXAPARIN SODIUM 40 MG: 40 INJECTION SUBCUTANEOUS at 20:50

## 2019-04-30 RX ADMIN — DORZOLAMIDE HYDROCHLORIDE AND TIMOLOL MALEATE 1 DROP: 20; 5 SOLUTION OPHTHALMIC at 08:35

## 2019-04-30 RX ADMIN — PIPERACILLIN SODIUM,TAZOBACTAM SODIUM 3.38 G: 3; .375 INJECTION, POWDER, FOR SOLUTION INTRAVENOUS at 16:59

## 2019-04-30 RX ADMIN — LINEZOLID 600 MG: 600 INJECTION, SOLUTION INTRAVENOUS at 10:09

## 2019-04-30 RX ADMIN — PIPERACILLIN SODIUM,TAZOBACTAM SODIUM 3.38 G: 3; .375 INJECTION, POWDER, FOR SOLUTION INTRAVENOUS at 00:28

## 2019-04-30 RX ADMIN — DORZOLAMIDE HYDROCHLORIDE AND TIMOLOL MALEATE 1 DROP: 20; 5 SOLUTION OPHTHALMIC at 20:50

## 2019-04-30 RX ADMIN — LEVOFLOXACIN 750 MG: 5 INJECTION, SOLUTION INTRAVENOUS at 20:51

## 2019-04-30 RX ADMIN — PIPERACILLIN SODIUM,TAZOBACTAM SODIUM 3.38 G: 3; .375 INJECTION, POWDER, FOR SOLUTION INTRAVENOUS at 08:29

## 2019-04-30 RX ADMIN — Medication 10 ML: at 14:01

## 2019-04-30 RX ADMIN — IPRATROPIUM BROMIDE AND ALBUTEROL SULFATE 3 ML: .5; 3 SOLUTION RESPIRATORY (INHALATION) at 13:17

## 2019-04-30 RX ADMIN — Medication 10 ML: at 06:00

## 2019-04-30 RX ADMIN — LATANOPROST 1 DROP: 50 SOLUTION OPHTHALMIC at 22:54

## 2019-04-30 RX ADMIN — LINEZOLID 600 MG: 600 INJECTION, SOLUTION INTRAVENOUS at 22:53

## 2019-04-30 RX ADMIN — BRIMONIDINE TARTRATE 1 DROP: 2 SOLUTION/ DROPS OPHTHALMIC at 08:31

## 2019-04-30 RX ADMIN — IPRATROPIUM BROMIDE AND ALBUTEROL SULFATE 3 ML: .5; 3 SOLUTION RESPIRATORY (INHALATION) at 07:19

## 2019-04-30 RX ADMIN — HYDRALAZINE HYDROCHLORIDE 10 MG: 20 INJECTION INTRAMUSCULAR; INTRAVENOUS at 17:07

## 2019-04-30 RX ADMIN — DEXTROSE MONOHYDRATE AND SODIUM CHLORIDE 75 ML/HR: 5; .45 INJECTION, SOLUTION INTRAVENOUS at 08:30

## 2019-04-30 RX ADMIN — DEXTROSE MONOHYDRATE, SODIUM CHLORIDE, AND POTASSIUM CHLORIDE: 50; 4.5; 2.98 INJECTION, SOLUTION INTRAVENOUS at 05:18

## 2019-04-30 RX ADMIN — BRIMONIDINE TARTRATE 1 DROP: 2 SOLUTION/ DROPS OPHTHALMIC at 20:50

## 2019-04-30 RX ADMIN — IPRATROPIUM BROMIDE AND ALBUTEROL SULFATE 3 ML: .5; 3 SOLUTION RESPIRATORY (INHALATION) at 19:59

## 2019-04-30 NOTE — PROGRESS NOTES
Palliative Medicine Consult Patient Name: Griffin Hubbard YOB: 1940 Date of Initial Consult: 4/29/2019 Reason for Consult: Care decisions Requesting Provider: Dr. Nando Solomon Primary Care Physician: Ngozi Pascal MD 
  
 SUMMARY:  
Griffin Hubbard is a 66 y.o. with a past history of stroke from October 2016, dysphasia secondary to stroke, paroxysmal A. fib, CHF, coronary disease, vascular dementia who was admitted on 4/28/2019 from [address] [place] [address] with a diagnosis of possible aspiration pneumonia, UTI. Current medical issues leading to Palliative Medicine involvement include: Care decisions. Past medical history/chart review patient is a 70-year-old female suffered a extensive stroke in October 2016. This is left her essentially bedbound, nonverbal, and on a puréed diet. She was seen by our team on several occasions but last visit was January 2017. At that time she was actually discharged home with hospice [facility]. Ultimately her family states she went to [address] [place] [address] where they attempted some rehab for about 30 days but now is essentially become a long-term care patient. She has remained nonverbal, nonambulatory, and requiring a puréed diet. According to her , he noticed some \"rattling\" in her chest and asked her to be sent to the hospital.  In the emergency room she had extensive work-up to include a CT scan of the chest that shows right lower lobe collapse/atelectasis, suspect recurrent aspiration. Speech therapy evaluation was attempted earlier today but unable to be completed secondary to mental status issues. According to family she is done okay on a puréed diet, but anytime attempts of feeding her something different were tried, she had symptoms of aspiration. Social history he is  and her  is at bedside. She has 4 adult children. PALLIATIVE DIAGNOSES:  
1. Goals of care discussion 2. Dysphasia with suspected aspiration related to a prior stroke and general debility. 3. Advanced care planning review 4. DNR discussion 5. Debility PLAN:  
1. Met with patient, , son, and granddaughter was on the phone. Reviewed all role again in her care and then discussed current medical issues. Patient is much more alert and attempting to interact. She unfortunately did not participate or refuse to participate with speech therapy earlier today. Discussed those results with family. 2. Goals of care continue current therapy. She is a long-term care resident for at [facility]. We then reviewed our concern about suspected recurrent aspiration and findings on her CT scan. Her  remains hopeful that she will be able to participate with speech therapy and restart a puréed diet. He is hoping that he does not have to make a decision about a PEG tube. We then discussed risk and benefits of PEG placement with all family. They are to talk further as a family about potentially proceeding with a PEG if needed. 3. Advance care planning patient has not completed an advanced medical directive and her  understands that he would service her medical power of . 4. CODE STATUS reviewed resuscitation and what CPR would entail. The patient's  had already signed a DO NOT RESUSCITATE form in 2016. He confirms those wishes today. 5. Symptom managementno acute symptoms for us to manage at this time 6. Psychosocial patient with excellent support from family. No spiritual concerns. 7. Discussed with bedside nurse 8. Initial consult note routed to primary continuity provider 9. Communicated plan of care with: Palliative IDT 
 
 
 GOALS OF CARE / TREATMENT PREFERENCES:  
[====Goals of Care====] GOALS OF CARE: 
Patient/Health Care Proxy Stated Goals: Other (comment)(Having to debate possible PEG) TREATMENT PREFERENCES:  
Code Status: DNR Advance Care Planning: Advance Care Planning 1/10/2017 Patient's Healthcare Decision Maker is: Legal Next of Kin Primary Decision Maker Name Andrew Alvarado Primary Decision Maker Phone Number [phone] Primary Decision Maker Relationship to Patient Spouse Secondary Decision Maker Name - Secondary Decision Maker Phone Number - Secondary Decision Maker Relationship to Patient -  
Confirm Advance Directive - Patient Would Like to Complete Advance Directive - Does the patient have other document types Do Not Resuscitate Medical Interventions: Limited additional interventions Other Instructions: The palliative care team has discussed with patient / health care proxy about goals of care / treatment preferences for patient. 
[====Goals of Care====] HISTORY:  
 
History obtained from: Chart, , daughter-in-law CHIEF COMPLAINT: Right Sola HPI/SUBJECTIVE: The patient is:  
[] Verbal and participatory [x] Non-participatory due to: Stroke 4/30 patient remains essentially nonverbal.  She is much more awake looking around the room and holding on the family's hands but Clinical Pain Assessment (nonverbal scale for severity on nonverbal patients):  
Clinical Pain Assessment Severity: 0 Adult Nonverbal Pain Scale Face: No particular expression or smile Activity (Movement): Lying quietly, normal position Guarding: Lying quietly, no positioning of hands over areas of body Physiology (Vital Signs): Stable vital signs Respiratory: Baseline RR/SpO2 compliant with ventilator Total Score: 0 
 
 
 FUNCTIONAL ASSESSMENT:  
 
Palliative Performance Scale (PPS): PPS: 40 PSYCHOSOCIAL/SPIRITUAL SCREENING:  
 
Advance Care Planning: 
Advance Care Planning 1/10/2017 Patient's Healthcare Decision Maker is: Legal Next of Kin Primary Decision Maker Name Andrew Alvarado Primary Decision Maker Phone Number [phone] Primary Decision Maker Relationship to Patient Spouse Secondary Decision Maker Name - Secondary Decision Maker Phone Number - Secondary Decision Maker Relationship to Patient -  
Confirm Advance Directive - Patient Would Like to Complete Advance Directive - Does the patient have other document types Do Not Resuscitate Any spiritual / Hoahaoism concerns: 
[] Yes /  [x] No 
 
Caregiver Burnout: 
[] Yes /  [x] No /  [] No Caregiver Present Anticipatory grief assessment:  
[x] Normal  / [] Maladaptive ESAS Anxiety: Anxiety: 0 
 
ESAS Depression: Depression: 0 REVIEW OF SYSTEMS:  
 
Positive and pertinent negative findings in ROS are noted above in HPI. The following systems were [x] reviewed / [] unable to be reviewed as noted in HPI Other findings are noted below. Systems: constitutional, ears/nose/mouth/throat, respiratory, gastrointestinal, genitourinary, musculoskeletal, integumentary, neurologic, psychiatric, endocrine. Positive findings noted below. Modified ESAS Completed by: provider Fatigue: 3 Drowsiness: 1 Depression: 0 Pain: 0 Anxiety: 0 Nausea: 0 Anorexia: 2 Dyspnea: 3 Constipation: No  
     
 
 
 PHYSICAL EXAM:  
 
From RN flowsheet: 
Wt Readings from Last 3 Encounters:  
04/29/19 135 lb (61.2 kg) 01/09/17 136 lb (61.7 kg) 01/03/17 150 lb (68 kg) Blood pressure 162/89, pulse 62, temperature 97.7 °F (36.5 °C), resp. rate 17, height 5' 6\" (1.676 m), weight 135 lb (61.2 kg), SpO2 94 %. Pain Scale 1: Adult Nonverbal Pain Scale Pain Intensity 1: 0 Last bowel movement, if known:  
 
Constitutional: More awake and alert Eyes: pupils equal, anicteric ENMT: no nasal discharge, moist mucous membranes Cardiovascular: regular rhythm, distal pulses intact Respiratory: breathing not labored, decreased at the right base, secretions noted. Gastrointestinal: soft non-tender, +bowel sounds Musculoskeletal: no deformity, no tenderness to palpation Skin: warm, dry Neurologic: following commands some commands Psychiatric: Nonverbal 
Other: 
 
 
 HISTORY:  
 
Active Problems: 
  Mitral regurgitation (10/22/2010) Chronic diastolic heart failure ([address] (2/26/2011) Paroxysmal atrial fibrillation ([address] (11/29/2011) Hypertrophic cardiomyopathy ([address] (6/1/2012) Glaucoma (1/13/2014) Cerebral atrophy (10/25/2016) Dementia (10/25/2016) CAD (coronary artery disease) () Hx of completed stroke (4/28/2019) Elevated troponin (4/28/2019) Pneumonia (4/28/2019) UTI (urinary tract infection) (4/28/2019) Past Medical History:  
Diagnosis Date  CAD (coronary artery disease)  Carotid occlusion, left 10/25/2016  Cerebral atrophy 10/25/2016  Dementia  Depression  Glaucoma  Hx of completed stroke  Hyperlipidemia LDL goal <70 10/25/2016  Hypertension  Hypertrophic cardiomyopathy ([address] 6/1/2012  Hypertrophic subaortic stenosis (idiopathic) (HCC)  Mitral regurgitation  Paroxysmal atrial fibrillation ([address] 11/29/2011 Past Surgical History:  
Procedure Laterality Date [address]  2004 Normal cors, EF 60%, significant MR. EDP 23  
 CARDIAC SURG PROCEDURE UNLIST  ECHO 2D ADULT  8/20/2010 HCM, Resting LVOT gradient 129 mmHg, grade 3-4 diastolic dysfunction, MAC, mod-severe MR, marked LAE.  ECHO 2D ADULT  5/27/11  
 mod-severe LVH, EF 65%, grade 3-4 diastolic dysfunction, LVOT resting gradient 42 mmHg, severe LAE, mod-severe MR, PA 46 mmHg Family History Problem Relation Age of Onset  Other Other   
     patient specifically denies the following in 1st degree relative: HTN, DM, CVA, CAD, ca  
 Heart Disease Mother History reviewed, no pertinent family history. Social History Tobacco Use  Smoking status: Never Smoker  Smokeless tobacco: Never Used Substance Use Topics  Alcohol use: No  
  Alcohol/week: 0.0 oz No Known Allergies Current Facility-Administered Medications Medication Dose Route Frequency  dextrose 5 % - 0.45% NaCl infusion  75 mL/hr IntraVENous CONTINUOUS  
 hydrALAZINE (APRESOLINE) 20 mg/mL injection 10 mg  10 mg IntraVENous Q6H PRN  
 morphine injection 1 mg  1 mg IntraVENous Q2H PRN  
 ondansetron (ZOFRAN) injection 4 mg  4 mg IntraVENous Q6H PRN  
 guaiFENesin (ROBITUSSIN) 100 mg/5 mL oral liquid 100 mg  100 mg Oral Q4H PRN  
 levoFLOXacin (LEVAQUIN) 750 mg in D5W IVPB  750 mg IntraVENous Q24H  
 albuterol-ipratropium (DUO-NEB) 2.5 MG-0.5 MG/3 ML  3 mL Nebulization Q6H RT  
 sodium chloride (NS) flush 5-40 mL  5-40 mL IntraVENous Q8H  
 sodium chloride (NS) flush 5-40 mL  5-40 mL IntraVENous PRN  
 acetaminophen (TYLENOL) tablet 650 mg  650 mg Oral Q6H PRN  
 naloxone (NARCAN) injection 0.4 mg  0.4 mg IntraVENous PRN  piperacillin-tazobactam (ZOSYN) 3.375 g in 0.9% sodium chloride (MBP/ADV) 100 mL ADV  3.375 g IntraVENous Q8H  
 brimonidine (ALPHAGAN) 0.2 % ophthalmic solution 1 Drop  1 Drop Right Eye BID  dorzolamide-timolol (COSOPT) 22.3-6.8 mg/mL ophthalmic solution 1 Drop  1 Drop Right Eye BID  geriatric multivitamins & minerals (ELDERTONIC) oral elixir 15 mL  15 mL Oral TIDAC  linezolid in dextrose 5% (ZYVOX) IVPB premix in D5W 600 mg  600 mg IntraVENous Q12H  
 latanoprost (XALATAN) 0.005 % ophthalmic solution 1 Drop  1 Drop Right Eye QHS  enoxaparin (LOVENOX) injection 40 mg  40 mg SubCUTAneous Q24H  
 
 
 
 LAB AND IMAGING FINDINGS:  
 
Lab Results Component Value Date/Time WBC 8.8 04/30/2019 03:43 AM  
 HGB 12.4 04/30/2019 03:43 AM  
 PLATELET 024 59/94/3973 03:43 AM  
 
Lab Results Component Value Date/Time  Sodium 145 04/30/2019 03:43 AM  
 Potassium 5.0 04/30/2019 03:43 AM  
 Chloride 112 (H) 04/30/2019 03:43 AM  
 CO2 29 04/30/2019 03:43 AM  
 BUN 19 04/30/2019 03:43 AM  
 Creatinine 0.96 04/30/2019 03:43 AM  
 Calcium 8.5 04/30/2019 03:43 AM  
 Magnesium 2.0 04/30/2019 03:43 AM  
 Phosphorus 1.8 (L) 04/29/2019 03:00 AM  
  
Lab Results Component Value Date/Time AST (SGOT) 18 04/29/2019 03:00 AM  
 Alk. phosphatase 51 04/29/2019 03:00 AM  
 Protein, total 5.4 (L) 04/29/2019 03:00 AM  
 Albumin 2.1 (L) 04/29/2019 03:00 AM  
 Globulin 3.3 04/29/2019 03:00 AM  
 
Lab Results Component Value Date/Time INR 1.1 04/28/2019 07:02 PM  
 Prothrombin time 10.8 04/28/2019 07:02 PM  
 aPTT 23.1 04/28/2019 07:02 PM  
  
Lab Results Component Value Date/Time Iron 49 01/15/2014 11:45 AM  
 TIBC 252 01/15/2014 11:45 AM  
 Iron % saturation 19 (L) 01/15/2014 11:45 AM  
 Ferritin 84 01/15/2014 11:45 AM  
  
No results found for: PH, PCO2, PO2 No components found for: [place] Lab Results Component Value Date/Time CK 30 04/30/2019 03:43 AM  
 CK - MB 1.8 04/30/2019 03:43 AM  
  
 
 
   
 
Total time:35 Counseling / coordination time, spent as noted above: 30 
> 50% counseling / coordination?: yes Prolonged service was provided for  []30 min   []75 min in face to face time in the presence of the patient, spent as noted above. Time Start:  
Time End:  
Note: this can only be billed with 81648 (initial) or 93399 (follow up). If multiple start / stop times, list each separately.

## 2019-04-30 NOTE — PROGRESS NOTES
Nutrition Assessment: 
 
RECOMMENDATIONS/INTERVENTION(S):  
Continue NPO per SLP- monitor POC Monitor if MBS will be done, if PEG to be placed, family decision Add Might shakes or Magic cup if pt diet initiated ASSESSMENT:  
4/30: 66 yr old female admitted for cough, fever. PMHx: Delores Barrette, CVA, HTN. Pt nonverbal. Resident of [facility] factility. Consult received. Pt currently NPO as SLP deemed pt unsafe for PO. Will monitor diet ddvacement met per SLP Pt dicussed in rounds. If pt needs TF via PEG, please consult. Monitor POC. Palliative following. Check if MBS able to be done. Labs reviewed. Weight appears stable. K+ WNL now, BG elevated 133, 237, 124 mg/dL. SUBJECTIVE/OBJECTIVE:  
Diet Order: NPO 
% Eaten:  No data found. Pertinent Medications: [x] Reviewed Labs reviewed:  [x] Anthropometrics: Height: 5' 6\" (167.6 cm) Weight: 61.2 kg (135 lb) IBW (%IBW):   ( ) UBW (%UBW):   (  %) BMI: Body mass index is 21.79 kg/m². This BMI is indicative of: 
 
 [x] Underweight for age    [] Normal    [] Overweight    []  Obesity    []  Extreme Obesity (BMI>40) Estimated Nutrition Needs (Based on): 1330 Kcals/day(BMR(1224x1.2)) , 61 g(-73g/day(1.0-1.2g/kg)) Protein Carbohydrate: At Least 130 g/day  Fluids: 1300 mL/day (1mL/kg rounded to 50 mL) Last BM: 4/30   []Active     []Hyperactive  [x]Hypoactive       [] Absent   BS Skin:    [x] Intact   [] Incision  [] Breakdown   [] DTI   [] Tears/Excoriation/Abrasion  []Edema [] Other: Wt Readings from Last 30 Encounters:  
04/29/19 61.2 kg (135 lb) 01/09/17 61.7 kg (136 lb) 01/03/17 68 kg (150 lb) 12/27/16 65 kg (143 lb 6.4 oz)  
11/22/16 70.3 kg (155 lb) 11/01/16 70.4 kg (155 lb 3.2 oz) 10/04/16 63.5 kg (140 lb)  
09/21/16 69.4 kg (153 lb) 08/01/16 68 kg (150 lb)  
07/26/16 68.8 kg (151 lb 9.6 oz)  
07/21/16 65.8 kg (145 lb) 10/27/15 64.5 kg (142 lb 3.2 oz)  
07/30/15 64 kg (141 lb)  
06/29/15 61.8 kg (136 lb 3.2 oz) 06/24/15 62.1 kg (136 lb 14.4 oz) 06/01/15 68 kg (150 lb) 05/22/15 65.7 kg (144 lb 12.8 oz) 01/30/15 65.2 kg (143 lb 12.8 oz) 09/25/14 67 kg (147 lb 12.8 oz) 01/29/14 63.5 kg (140 lb) 01/16/14 64.4 kg (141 lb 14.4 oz) 09/07/12 63.5 kg (140 lb) 06/01/12 64.9 kg (143 lb)  
11/29/11 64 kg (141 lb) 05/27/11 59.4 kg (131 lb)  
02/25/11 59.9 kg (132 lb) NUTRITION DIAGNOSES:  
Problem:  Swallowing difficulty Etiology: related to alteration in chew/swallow pattern Signs/Symptoms: as evidenced by lethargy, dementia, SLP recs for NPO   
 
NUTRITION INTERVENTIONS: 
Meals/Snacks: General/healthful diet Enteral/Parenteral Nutrition: Initiate enteral nutrition Supplements: Commercial supplement GOAL:  
Pt will tolerate PO w/i 3-5 days or alternate means of nutrition willl initiate Cultural, Jehovah's witness, or Ethnic Dietary Needs: None LEARNING NEEDS (Diet, Food/Nutrient-Drug Interaction):  
 [x] None Identified 
 [] Identified and Education Provided/Documented 
 [] Identified and Pt declined/was not appropriate [x] Interdisciplinary Care Plan Reviewed/Documented  
 [x] Discharge Needs: TBD- PEG vs Pureed vs comfort? [] No Nutrition Related Discharge Needs NUTRITION RISK:  
Pt Is At Nutrition Risk  [x] No Nutrition Risk Identified  [] PT SEEN FOR:  
 [x]  MD Consult: []Calorie Count []Diabetic Diet Education []Diet Education []Electrolyte Management 
   [x]General Nutrition Management and Supplements []Management of Tube Feeding []TPN Recommendations []  RN Referral:  []MST score >=2 
   []Enteral/Parenteral Nutrition PTA []Pregnant: Gestational DM or Multigestation  
              [] Pressure Ulcer 
   
[]  Low BMI      []  Length of Stay       [] Dysphagia Diet     [] Ventilator      [] Follow-Up Previous Recommendations: 
 [] Implemented          [] Not Implemented          [x] Not Applicable Previous Goal: [] Met              [] Progressing Towards Goal              [] Not Progressing Towards Goal   [x] Not Applicable Desi Holliday RD Pager: [phone] Office: [phone]

## 2019-04-30 NOTE — PROGRESS NOTES
4/30/2019   CARE MANAGEMENT NOTE:   
 
Transition Plan of Care: 1. Pt is a long term resident at [facility]. 2.  Palliative Care team is following and will discuss PEG vs hospice. 3.  Pt's , Russ Jones is point of contact ( [id number]; [phone]. [phone]) 4. Pt is totally depn so ambulance transport will be needed to NH upon discharge. CM will continue to follow pt upon hospital discharge. [place]

## 2019-04-30 NOTE — PROGRESS NOTES
Sean King [facility] 
[address] [facility], [address] [place] [address] 
[phone] Medical Progress Note NAME: Jane Villagomez :  1940 MRM:  [id number] Date/Time: 2019  8:49 PM 
 
  
Subjective: Chief Complaint:  Pt non-verbal. No family at the bedside. Pt seen and examined. Chart reviewed. Pt currently NPO as per speech Objective:  
 
 
Vitals:  
 
 
  
Last 24hrs VS reviewed since prior progress note. Most recent are: 
 
Visit Vitals BP (!) 156/91 (BP 1 Location: Left arm, BP Patient Position: At rest) Pulse 77 Temp 97.4 °F (36.3 °C) Resp 16 Ht 5' 6\" (1.676 m) Wt 61.2 kg (135 lb) SpO2 90% BMI 21.79 kg/m² SpO2 Readings from Last 6 Encounters:  
19 90% 17 93% 17 97% 16 95% 16 99% 16 91% No intake or output data in the 24 hours ending 19 Exam:  
 
Physical Exam: 
 
Gen: Chronically ill appearing female HEENT:  Pink conjunctivae, PERRL, hearing intact to voice, moist mucous membranes Neck:  Supple, without masses, thyroid non-tender Resp: Coarse breath sounds Card:  No murmurs, normal S1, S2 without thrills, bruits or peripheral edema Abd:  Soft, non-tender, non-distended, normoactive bowel sounds are present Musc:  No cyanosis or clubbing Skin:  No rashes or ulcers, skin turgor is good Neuro: R sided hemiparesis. Non-verbal. Does not track. Does not follow commands Psych: Poor insight Medications Reviewed: (see below) Lab Data Reviewed: (see below) 
 
______________________________________________________________________ Medications:  
 
Current Facility-Administered Medications Medication Dose Route Frequency  dextrose 5% - 0.45% NaCl with KCl 40 mEq/L infusion   IntraVENous CONTINUOUS  
 levoFLOXacin (LEVAQUIN) 750 mg in D5W IVPB  750 mg IntraVENous Q24H  
 guaiFENesin ER (MUCINEX) tablet 600 mg  600 mg Oral Q12H  albuterol-ipratropium (DUO-NEB) 2.5 MG-0.5 MG/3 ML  3 mL Nebulization Q6H RT  
 sodium chloride (NS) flush 5-40 mL  5-40 mL IntraVENous Q8H  
 sodium chloride (NS) flush 5-40 mL  5-40 mL IntraVENous PRN  
 acetaminophen (TYLENOL) tablet 650 mg  650 mg Oral Q6H PRN  
 naloxone (NARCAN) injection 0.4 mg  0.4 mg IntraVENous PRN  piperacillin-tazobactam (ZOSYN) 3.375 g in 0.9% sodium chloride (MBP/ADV) 100 mL ADV  3.375 g IntraVENous Q8H  
 brimonidine (ALPHAGAN) 0.2 % ophthalmic solution 1 Drop  1 Drop Right Eye BID  dorzolamide-timolol (COSOPT) 22.3-6.8 mg/mL ophthalmic solution 1 Drop  1 Drop Right Eye BID  memantine ER (NAMENDA XR) capsule 28 mg  28 mg Oral DAILY  geriatric multivitamins & minerals (ELDERTONIC) oral elixir 15 mL  15 mL Oral TIDAC  metoprolol tartrate (LOPRESSOR) tablet 75 mg  75 mg Oral BID  linezolid in dextrose 5% (ZYVOX) IVPB premix in D5W 600 mg  600 mg IntraVENous Q12H  
 latanoprost (XALATAN) 0.005 % ophthalmic solution 1 Drop  1 Drop Right Eye QHS  enoxaparin (LOVENOX) injection 40 mg  40 mg SubCUTAneous Q24H Lab Review:  
 
Recent Labs  
  04/29/19 
0300 04/28/19 
1902 WBC 9.2 11.1* HGB 10.7* 12.9 HCT 34.1* 40.9 * 188 Recent Labs  
  04/29/19 
0300 04/28/19 
1902 * 147* K 3.4* 4.1 * 116* CO2 26 28 * 122* BUN 24* 29* CREA 0.73 1.00  
CA 7.1* 8.6 MG 1.8  --   
PHOS 1.8*  --   
ALB 2.1* 2.8* SGOT 18 27 ALT 25 36 INR  --  1.1 No components found for: [place] Pneumonia: concerning for PNA. Likely aspiration  
-continue IV antibiotics -appreciate speech eval; currently NPO due to mental status. May need MBS.  
-palliative care on board to assist with goals of care discussions 
  
  UTI (urinary tract infection): given hx of VRE 
-continue linezolid which is also providing coverage for #1 
  
Elevated troponin: suspect strain, demand ischemia -CTA chest with ?chronic PE. Per pulm, no large occlusion or RV strain. No AC at this time  
  
  Chronic diastolic heart failure / Mitral regurgitation / Hypertrophic cardiomyopathy: proBNP does not appear to be accurately reflecting volume status. Actually appears IVVD  
-see below Hypernatremia  
-start hypotonic fluids as NPO Hypokalemia  
-replete  
  
  Paroxysmal atrial fibrillation:  
-on metoprolol however currently NPO as per speech 
  
  CAD (coronary artery disease): not on ASA or statin 
-see above re: metoprolol  
  
  Glaucoma:  
-Alphagan, Cosopt, Xalatan 
  
  Cerebral atrophy / Dementia / History of CVA: nonverbal at baseline. Does not ambulate. Poor quality of life. Now with concerns for aspiration and with PNA. Speech currently recommends NPO status. Palliative on board. DNR. May be a candidate for hospice if fails speech/MBS  
  
Total time spent with patient: 28 Minutes Total time spent with patient: Lovenox Care Plan discussed with: Patient, RN Discussed:  Care plan Prophylaxis:  Lovenox Disposition:  TBD        
___________________________________________________ Attending Physician: Marie Jiang MD

## 2019-04-30 NOTE — CONSULTS
[facility]     Name: Neeta Pretty MRN: [id number]   : 1940 Hospital: [address]   Date: 2019        Impression Plan   1. Encephalopathy  2. Urinary tract infection  3. RLL collapse due to aspiration/poor secretion mobilization  4. Parainfluenza 3  5. Chronic aspiration  6. Dementia               · Chest imaging more consistent with mucus plug due to aspiration vs. Poor secretion mobilization. It is possible that pt started as a URI/PNA and mucus production from this causing the CT chest findings vs. Aspiration alone. · Speech following. · Palliative following since pt may need PEG moving forward  · Continue zosyn/levoquin  · Chest PT  · Mucinex  · Duonebs  · Urine Cx with GNR  · Linezolide added for VRE UTI hx  · ? Of chronic PE but not with large occlusion or obvious chronic RV strain. Echo with no RV strain. Would repeat CTA chest in 6 weeks . Would not treat with AC at this time. Radiology  ( personally reviewed) CT chest showed a partially collpased RLL with severe scoliosis of the chest wall. Per radiology there is a note of old pulmonary embolism vs. LAD, however difficult to distinguish. ABG No results for input(s): PHI, PO2I, PCO2I in the last 72 hours. Subjective     Cc: lethargy    67 yo with PMHx of dementia, CVA and CAD who presented with increased lethargy from her nursing home. Pt is unable to provide further hx due to dementia and AMS. Per notes associated with cough and fevers. Pt does get fed purees at the nursing home. Currently she is on RA. CT chest showed a partially collpased RLL with severe scoliosis of the chest wall. Per radiology there is a note of old pulmonary embolism vs. LAD, however difficult to distinguish. Overnight events:  Pt a little more awake this morning. Will not track. Weak cough. RVP Parainfluenza +  Echo with EF 50-55%, global hypokinesis, RV nl, trace tricuspid regurg.        Past Medical History:   Diagnosis Date    CAD (coronary artery disease)     Carotid occlusion, left 10/25/2016    Cerebral atrophy 10/25/2016    Dementia     Depression     Glaucoma     Hx of completed stroke     Hyperlipidemia LDL goal <70 10/25/2016    Hypertension     Hypertrophic cardiomyopathy ([facility] [address] 6/1/2012    Hypertrophic subaortic stenosis (idiopathic) (HCC)     Mitral regurgitation     Paroxysmal atrial fibrillation ([facility] [address] 11/29/2011      Past Surgical History:   Procedure Laterality Date    CARDIAC CATHETERIZATION  2004    Normal cors, EF 60%, significant MR. EDP 23    CARDIAC SURG PROCEDURE UNLIST      ECHO 2D ADULT  8/20/2010    HCM, Resting LVOT gradient 129 mmHg, grade 3-4 diastolic dysfunction, MAC, mod-severe MR, marked LAE.  ECHO 2D ADULT  5/27/11    mod-severe LVH, EF 65%, grade 3-4 diastolic dysfunction, LVOT resting gradient 42 mmHg, severe LAE, mod-severe MR, PA 46 mmHg      Prior to Admission medications    Medication Sig Start Date End Date Taking? Authorizing Provider   metoprolol tartrate (LOPRESSOR) 25 mg tablet Take 75 mg by mouth two (2) times a day. Yes Provider, Historical   potassium chloride SR (KLOR-CON 10) 10 mEq tablet Take 20 mEq by mouth daily. Yes Provider, Historical   loperamide (IMODIUM) 2 mg capsule Take 2 mg by mouth as needed for Diarrhea. Yes Provider, Historical   geriatric multivitamins & minerals (ELDERTONIC) elix Take 15 mL by mouth Before breakfast, lunch, and dinner. Yes Provider, Historical   bimatoprost (LUMIGAN) 0.03 % ophthalmic drops Administer 1 Drop to right eye nightly. Yes Provider, Historical   brimonidine (ALPHAGAN) 0.2 % ophthalmic solution Administer 1 Drop to right eye two (2) times a day. Yes Provider, Historical   acetaminophen (TYLENOL) 650 mg suppository Insert 650 mg into rectum every four (4) hours as needed for Fever.    Yes Provider, Historical   acetaminophen (TYLENOL) 325 mg tablet Take 325 mg by mouth every four (4) hours as needed for Pain or Fever. Yes Provider, Historical   bumetanide (BUMEX) 0.5 mg tablet Take 0.25 mg by mouth three (3) days a week. Patient takes on Monday, Wednesday, Friday    Yes Provider, Historical   memantine ER (NAMENDA XR) 28 mg capsule Take 1 Cap by mouth daily. 9/21/16  Yes Mary EASTON NP   dorzolamide-timolol (COSOPT) 2-0.5 % ophthalmic solution Administer 1 Drop to right eye two (2) times a day. Yes Provider, Historical     Current Facility-Administered Medications   Medication Dose Route Frequency    dextrose 5 % - 0.45% NaCl infusion  75 mL/hr IntraVENous CONTINUOUS    levoFLOXacin (LEVAQUIN) 750 mg in D5W IVPB  750 mg IntraVENous Q24H    albuterol-ipratropium (DUO-NEB) 2.5 MG-0.5 MG/3 ML  3 mL Nebulization Q6H RT    sodium chloride (NS) flush 5-40 mL  5-40 mL IntraVENous Q8H    piperacillin-tazobactam (ZOSYN) 3.375 g in 0.9% sodium chloride (MBP/ADV) 100 mL ADV  3.375 g IntraVENous Q8H    brimonidine (ALPHAGAN) 0.2 % ophthalmic solution 1 Drop  1 Drop Right Eye BID    dorzolamide-timolol (COSOPT) 22.3-6.8 mg/mL ophthalmic solution 1 Drop  1 Drop Right Eye BID    geriatric multivitamins & minerals (ELDERTONIC) oral elixir 15 mL  15 mL Oral TIDAC    linezolid in dextrose 5% (ZYVOX) IVPB premix in D5W 600 mg  600 mg IntraVENous Q12H    latanoprost (XALATAN) 0.005 % ophthalmic solution 1 Drop  1 Drop Right Eye QHS    enoxaparin (LOVENOX) injection 40 mg  40 mg SubCUTAneous Q24H     No Known Allergies   Social History     Tobacco Use    Smoking status: Never Smoker    Smokeless tobacco: Never Used   Substance Use Topics    Alcohol use: No     Alcohol/week: 0.0 oz      Family History   Problem Relation Age of Onset    Other Other         patient specifically denies the following in 1st degree relative: HTN, DM, CVA, CAD, ca    Heart Disease Mother           Laboratory: I have personally reviewed the critical care flowsheet and labs.      Recent Labs     04/30/19  6614 04/30/19  0112 04/29/19  0300   WBC 8.8 9.1 9.2   HGB 12.4 12.1 10.7*   HCT 40.0 39.2 34.1*    174 144*     Recent Labs     04/30/19  0343 04/30/19  0118 04/29/19  0300 04/28/19  1902    142 147* 147*   K 5.0 5.9* 3.4* 4.1   * 113* 118* 116*   CO2 29 26 26 28   * 237* 124* 122*   BUN 19 19 24* 29*   CREA 0.96 0.96 0.73 1.00   CA 8.5 7.9* 7.1* 8.6   MG 2.0 1.9 1.8  --    PHOS  --   --  1.8*  --    ALB  --   --  2.1* 2.8*   SGOT  --   --  18 27   ALT  --   --  25 36   INR  --   --   --  1.1       Objective:     Mode Rate Tidal Volume Pressure FiO2 PEEP                    Vital Signs:     TMAX(24)      Intake/Output:   Last shift:         Last 3 shifts: No intake/output data recorded. [id number] intake or output data in the 24 hours ending 04/30/19 0944  EXAM:   GENERAL: elderly, opens eyes to stimuli and turns head. Moves arms. . Wet cough, HEENT:  PERRL, EOMI, no alar flaring or epistaxis, oral mucosa moist without cyanosis, NECK:  no jugular vein distention, no retractions, no thyromegaly or masses, LUNGS: rhonci and rales on right side HEART:  Regular rate and rhythm with no MGR; no edema is present, ABDOMEN:  soft with no tenderness, bowel sounds present, EXTREMITIES:  warm with no cyanosis, SKIN:  no jaundice or ecchymosis and NEUROLOGIC:  alert but not interactive, contracted on right side    Emir Grant MD  [facility]

## 2019-04-30 NOTE — PROGRESS NOTES
RN contacted MD Sanjuan Curling for pt's labs seeming contaminated as pt/s potassium was 5.9 and glucose was 237 (pt is not diabetic). Orders for lab redraw given.

## 2019-04-30 NOTE — PROGRESS NOTES
Bedside and Verbal shift change report given to Mitchell Bennett (oncoming nurse) by Octavio George (offgoing nurse). Report included the following information SBAR, Kardex, Intake/Output, MAR, Accordion and Recent Results.

## 2019-04-30 NOTE — WOUND CARE
Wound care consult: 
Initial visit: Consulted for \"skin assessment\". Patient lying in bed. No distress. Family at bedside. Assessment All skin folds and bony prominences assessed, turned with staff assistance. Sacrum and buttocks: Scars noted as areas of hypopigmentation. Skin is intact. Incontinent care given with moisture barrier wipes. Barrier ointment applied. PureWick changed Repositioned in bed on left side Prevalon boots to heels Recommendations/Plan Mobility team on board Turn, reposition every 2 hours as tolerated, float heels, place in prevalon boots. Incontinent care with comfort shields. Apply Zinc to all open areas, moisture barrier as needed. Reconsult as needed.

## 2019-04-30 NOTE — PROGRESS NOTES
Bedside shift change report given to Ajay Martin (oncoming nurse) by Mary Rose (offgoing nurse). Report included the following information SBAR, Kardex, MAR and Recent Results.

## 2019-04-30 NOTE — PROGRESS NOTES
SPEECH LANGUAGE PATHOLOGY DYSPHAGIA TREATMENT Patient: Kristie Hansen (85 y.o. female) Date: 4/30/2019 Diagnosis: Pneumonia [J18.9] <principal problem not specified> Precautions: aspiration ASSESSMENT: 
Patient alert, but not cooperative with attempts with PO trials. She is actively refusing PO trials and fighting oral care. Unable to assess swallow. She has significant aspiration risks due to 1) NO oral care with potential to grow significant amounts of gram negative bacteria 2) feeder status 3)  s/s of aspriation on chest CT. 4) h/o significant dysphagia. Prognosis for recovery of swallow to be safe to take PO and to take sufficient amounts of PO. Progression toward goals: 
?         Improving appropriately and progressing toward goals ? Improving slowly and progressing toward goals ? Not making progress toward goals and plan of care will be adjusted PLAN: 
Recommendations and Planned Interventions: 
NPO, including meds. Re-eval when pateint shows interest in PO Cotninue to attempt oral care and at least chapstick to lips Patient continues to benefit from skilled intervention to address the above impairments. Continue treatment per established plan of care. Discharge Recommendations: To Be Determined SUBJECTIVE:  
Patient eyes open with stimuli? . 
 
OBJECTIVE:  
Cognitive and Communication Status: 
Neurologic State: Alert Orientation Level: Unable to verbalize Cognition: No command following Perception: (difficult to assess. pateint with glaucoma) Perseveration: No perseveration noted Safety/Judgement: Lack of insight into deficits Dysphagia Treatment: 
Oral Assessment: P.O. Trials: 
Patient Position: upright in bed Vocal quality prior to P.O.:   
Consistency Presented: (dry spoon and cup) ORAL PHASE:  
Patient alert and looking at SLP. Tried to have her take sips from cup or drink from straw. She actively refused by clenching her lips and turning her head away. After session, tried to complete oral sx, she grabbed the yankauer and attempted Exercises: 
Laryngeal Exercises: 
  
  
  
  
  
  
  
  
  
  
  
  
  
  
  
  
  
  
  
  
  
  
  
  
  
  
  
  
  
  
  
  
  
  
  
  
  
  
  
  
  
  
  
Pain: 
Pain Scale 1: Adult Nonverbal Pain Scale Pain Intensity 1: 0 After treatment:  
?              Patient left in no apparent distress sitting up in chair ? Patient left in no apparent distress in bed 
? Call bell left within reach ? Nursing notified ? Caregiver present ? Bed alarm activated COMMUNICATION/EDUCATION:  
Patient was educated regarding Her deficit(s) of dysphagia  as this relates to Her diagnosis of h/o CVA. She demonstrated Guarded understanding as evidenced by confusion, nonverbal status. . 
 
The patient?s plan of care including recommendations, planned interventions, and recommended diet changes were discussed with: Registered Nurse and Physician. ? Posted safety precautions in patient's room. BETHANIE Conde Time Calculation: 15 mins

## 2019-04-30 NOTE — PROGRESS NOTES
[facility] 
[address] [facility], [address] [place] [address] 
[phone] Medical Progress Note NAME: Jenny Lubin :  1940 MRM:  [id number] Date/Time: 2019  8:49 PM 
 
  
Subjective: Chief Complaint:  Pt non-verbal. No family at the bedside. Pt seen and examined. Chart reviewed. Present at the time of speech eval. Pt would not open mouth and turned her head away from speech therapist in refusal of eval  
 
  
Objective:  
 
 
Vitals:  
 
 
  
Last 24hrs VS reviewed since prior progress note. Most recent are: 
 
Visit Vitals /89 (BP 1 Location: Left arm, BP Patient Position: At rest;Supine) Pulse 62 Temp 97.7 °F (36.5 °C) Resp 17 Ht 5' 6\" (1.676 m) Wt 61.2 kg (135 lb) SpO2 94% BMI 21.79 kg/m² SpO2 Readings from Last 6 Encounters:  
19 94% 17 93% 17 97% 16 95% 16 99% 16 91% Intake/Output Summary (Last 24 hours) at 2019 1235 Last data filed at 2019 9848 Gross per 24 hour Intake 0 ml Output  Net 0 ml Exam:  
 
Physical Exam: 
 
Gen: Chronically ill appearing female HEENT:  Pink conjunctivae, PERRL, hearing intact to voice, moist mucous membranes Neck:  Supple, without masses, thyroid non-tender Resp: Coarse breath sounds Card:  No murmurs, normal S1, S2 without thrills, bruits or peripheral edema Abd:  Soft, non-tender, non-distended, normoactive bowel sounds are present Musc:  No cyanosis or clubbing Skin:  No rashes or ulcers, skin turgor is good Neuro: R sided hemiparesis. Non-verbal. Does not track. Does not follow commands Psych: Poor insight Medications Reviewed: (see below) Lab Data Reviewed: (see below) 
 
______________________________________________________________________ Medications:  
 
Current Facility-Administered Medications Medication Dose Route Frequency  dextrose 5 % - 0.45% NaCl infusion  75 mL/hr IntraVENous CONTINUOUS  
 hydrALAZINE (APRESOLINE) 20 mg/mL injection 10 mg  10 mg IntraVENous Q6H PRN  
 morphine injection 1 mg  1 mg IntraVENous Q2H PRN  
 ondansetron (ZOFRAN) injection 4 mg  4 mg IntraVENous Q6H PRN  
 guaiFENesin (ROBITUSSIN) 100 mg/5 mL oral liquid 100 mg  100 mg Oral Q4H PRN  
 levoFLOXacin (LEVAQUIN) 750 mg in D5W IVPB  750 mg IntraVENous Q24H  
 albuterol-ipratropium (DUO-NEB) 2.5 MG-0.5 MG/3 ML  3 mL Nebulization Q6H RT  
 sodium chloride (NS) flush 5-40 mL  5-40 mL IntraVENous Q8H  
 sodium chloride (NS) flush 5-40 mL  5-40 mL IntraVENous PRN  
 acetaminophen (TYLENOL) tablet 650 mg  650 mg Oral Q6H PRN  
 naloxone (NARCAN) injection 0.4 mg  0.4 mg IntraVENous PRN  piperacillin-tazobactam (ZOSYN) 3.375 g in 0.9% sodium chloride (MBP/ADV) 100 mL ADV  3.375 g IntraVENous Q8H  
 brimonidine (ALPHAGAN) 0.2 % ophthalmic solution 1 Drop  1 Drop Right Eye BID  dorzolamide-timolol (COSOPT) 22.3-6.8 mg/mL ophthalmic solution 1 Drop  1 Drop Right Eye BID  geriatric multivitamins & minerals (ELDERTONIC) oral elixir 15 mL  15 mL Oral TIDAC  linezolid in dextrose 5% (ZYVOX) IVPB premix in D5W 600 mg  600 mg IntraVENous Q12H  
 latanoprost (XALATAN) 0.005 % ophthalmic solution 1 Drop  1 Drop Right Eye QHS  enoxaparin (LOVENOX) injection 40 mg  40 mg SubCUTAneous Q24H Lab Review:  
 
Recent Labs 04/30/19 
0343 04/30/19 
0118 04/29/19 
0300 WBC 8.8 9.1 9.2 HGB 12.4 12.1 10.7* HCT 40.0 39.2 34.1*  
 174 144* Recent Labs 04/30/19 
0343 04/30/19 
0118 04/29/19 
0300 04/28/19 
1902  142 147* 147*  
K 5.0 5.9* 3.4* 4.1 * 113* 118* 116* CO2 29 26 26 28 * 237* 124* 122* BUN 19 19 24* 29* CREA 0.96 0.96 0.73 1.00  
CA 8.5 7.9* 7.1* 8.6 MG 2.0 1.9 1.8  --   
PHOS  --   --  1.8*  --   
ALB  --   --  2.1* 2.8* SGOT  --   --  18 27 ALT  --   --  25 36 INR  --   --   --  1.1 No components found for: [place] Pneumonia: concerning for PNA. Likely aspiration 
-continue IV antibiotics -appreciate speech eval; currently NPO due to mental status. Doubt will be able to perform MBS as will not open mouth or follow commands to safely do so  
-palliative care on board to assist with goals of care discussions Parainfluenza 
-supportive care 
-droplet precautions  
  
  UTI (urinary tract infection): given hx of VRE 
-awaiting urine cultures; pre-[place] with GNR's so unlikely VRE 
  
Elevated troponin: suspect strain, demand ischemia 
-CTA chest with ?chronic PE. Per pulm, no large occlusion or RV strain. No AC at this time  
  
  Chronic diastolic heart failure / Mitral regurgitation / Hypertrophic cardiomyopathy: proBNP does not appear to be accurately reflecting volume status. Actually appears IVVD  
-see below Hypernatremia  
-improving with hypotonic fluids Hypokalemia - repleted 
-stop K in the IVF's  
  
  Paroxysmal atrial fibrillation:  
-on metoprolol however currently NPO as per speech 
  
  CAD (coronary artery disease): not on ASA or statin 
-see above re: metoprolol  
-hydralazine PRN  
  
  Glaucoma:  
-Alphagan, Cosopt, Xalatan 
  
  Cerebral atrophy / Dementia / History of CVA: nonverbal at baseline. Does not ambulate. Poor quality of life. Now with concerns for aspiration and with PNA. Speech currently recommends NPO status. Palliative on board. DNR. May be a candidate for hospice as unable to start diet due to aspiration risk  
  
Total time spent with patient: 35 Minutes Total time spent with patient: Lovenox Care Plan discussed with: Patient, RN Discussed:  Care plan Prophylaxis:  Lovenox Disposition:  TBD        
___________________________________________________ Attending Physician: Chris Camacho MD

## 2019-05-01 LAB
ANION GAP SERPL CALC-SCNC: 9 MMOL/L (ref 5–15)
BACTERIA SPEC CULT: ABNORMAL
BASOPHILS # BLD: 0 K/UL (ref 0–0.1)
BASOPHILS NFR BLD: 0 % (ref 0–1)
BUN SERPL-MCNC: 13 MG/DL (ref 6–20)
BUN/CREAT SERPL: 16 (ref 12–20)
CALCIUM SERPL-MCNC: 8.3 MG/DL (ref 8.5–10.1)
CC UR VC: ABNORMAL
CHLORIDE SERPL-SCNC: 109 MMOL/L (ref 97–108)
CO2 SERPL-SCNC: 22 MMOL/L (ref 21–32)
CREAT SERPL-MCNC: 0.8 MG/DL (ref 0.55–1.02)
DIFFERENTIAL METHOD BLD: ABNORMAL
EOSINOPHIL # BLD: 0.1 K/UL (ref 0–0.4)
EOSINOPHIL NFR BLD: 1 % (ref 0–7)
ERYTHROCYTE [DISTWIDTH] IN BLOOD BY AUTOMATED COUNT: 11.6 % (ref 11.5–14.5)
GLUCOSE SERPL-MCNC: 102 MG/DL (ref 65–100)
HCT VFR BLD AUTO: 38.2 % (ref 35–47)
HGB BLD-MCNC: 12.4 G/DL (ref 11.5–16)
IMM GRANULOCYTES # BLD AUTO: 0.1 K/UL (ref 0–0.04)
IMM GRANULOCYTES NFR BLD AUTO: 1 % (ref 0–0.5)
LYMPHOCYTES # BLD: 1.3 K/UL (ref 0.8–3.5)
LYMPHOCYTES NFR BLD: 12 % (ref 12–49)
MAGNESIUM SERPL-MCNC: 1.6 MG/DL (ref 1.6–2.4)
MCH RBC QN AUTO: 31.6 PG (ref 26–34)
MCHC RBC AUTO-ENTMCNC: 32.5 G/DL (ref 30–36.5)
MCV RBC AUTO: 97.4 FL (ref 80–99)
MONOCYTES # BLD: 0.8 K/UL (ref 0–1)
MONOCYTES NFR BLD: 7 % (ref 5–13)
NEUTS SEG # BLD: 8.4 K/UL (ref 1.8–8)
NEUTS SEG NFR BLD: 79 % (ref 32–75)
NRBC # BLD: 0 K/UL (ref 0–0.01)
NRBC BLD-RTO: 0 PER 100 WBC
PLATELET # BLD AUTO: 222 K/UL (ref 150–400)
PMV BLD AUTO: 13 FL (ref 8.9–12.9)
POTASSIUM SERPL-SCNC: 3.6 MMOL/L (ref 3.5–5.1)
RBC # BLD AUTO: 3.92 M/UL (ref 3.8–5.2)
SERVICE CMNT-IMP: ABNORMAL
SODIUM SERPL-SCNC: 140 MMOL/L (ref 136–145)
WBC # BLD AUTO: 10.6 K/UL (ref 3.6–11)

## 2019-05-01 PROCEDURE — 74011250636 HC RX REV CODE- 250/636: Performed by: INTERNAL MEDICINE

## 2019-05-01 PROCEDURE — 80048 BASIC METABOLIC PNL TOTAL CA: CPT

## 2019-05-01 PROCEDURE — 94668 MNPJ CHEST WALL SBSQ: CPT

## 2019-05-01 PROCEDURE — 36415 COLL VENOUS BLD VENIPUNCTURE: CPT

## 2019-05-01 PROCEDURE — 83735 ASSAY OF MAGNESIUM: CPT

## 2019-05-01 PROCEDURE — 94640 AIRWAY INHALATION TREATMENT: CPT

## 2019-05-01 PROCEDURE — 65660000000 HC RM CCU STEPDOWN

## 2019-05-01 PROCEDURE — 92526 ORAL FUNCTION THERAPY: CPT

## 2019-05-01 PROCEDURE — 77030038269 HC DRN EXT URIN PURWCK BARD -A

## 2019-05-01 PROCEDURE — 74011000258 HC RX REV CODE- 258: Performed by: INTERNAL MEDICINE

## 2019-05-01 PROCEDURE — 74011000250 HC RX REV CODE- 250: Performed by: INTERNAL MEDICINE

## 2019-05-01 PROCEDURE — 85025 COMPLETE CBC W/AUTO DIFF WBC: CPT

## 2019-05-01 RX ORDER — MAGNESIUM SULFATE HEPTAHYDRATE 40 MG/ML
2 INJECTION, SOLUTION INTRAVENOUS ONCE
Status: COMPLETED | OUTPATIENT
Start: 2019-05-01 | End: 2019-05-01

## 2019-05-01 RX ORDER — POTASSIUM CHLORIDE 7.45 MG/ML
10 INJECTION INTRAVENOUS
Status: COMPLETED | OUTPATIENT
Start: 2019-05-01 | End: 2019-05-01

## 2019-05-01 RX ADMIN — POTASSIUM CHLORIDE 10 MEQ: 10 INJECTION, SOLUTION INTRAVENOUS at 11:36

## 2019-05-01 RX ADMIN — MAGNESIUM SULFATE HEPTAHYDRATE 2 G: 40 INJECTION, SOLUTION INTRAVENOUS at 07:59

## 2019-05-01 RX ADMIN — IPRATROPIUM BROMIDE AND ALBUTEROL SULFATE 3 ML: .5; 3 SOLUTION RESPIRATORY (INHALATION) at 19:57

## 2019-05-01 RX ADMIN — PIPERACILLIN SODIUM,TAZOBACTAM SODIUM 3.38 G: 3; .375 INJECTION, POWDER, FOR SOLUTION INTRAVENOUS at 15:42

## 2019-05-01 RX ADMIN — PIPERACILLIN SODIUM,TAZOBACTAM SODIUM 3.38 G: 3; .375 INJECTION, POWDER, FOR SOLUTION INTRAVENOUS at 00:23

## 2019-05-01 RX ADMIN — DORZOLAMIDE HYDROCHLORIDE AND TIMOLOL MALEATE 1 DROP: 20; 5 SOLUTION OPHTHALMIC at 08:03

## 2019-05-01 RX ADMIN — DORZOLAMIDE HYDROCHLORIDE AND TIMOLOL MALEATE 1 DROP: 20; 5 SOLUTION OPHTHALMIC at 20:27

## 2019-05-01 RX ADMIN — POTASSIUM CHLORIDE 10 MEQ: 10 INJECTION, SOLUTION INTRAVENOUS at 07:58

## 2019-05-01 RX ADMIN — POTASSIUM CHLORIDE 10 MEQ: 10 INJECTION, SOLUTION INTRAVENOUS at 10:34

## 2019-05-01 RX ADMIN — IPRATROPIUM BROMIDE AND ALBUTEROL SULFATE 3 ML: .5; 3 SOLUTION RESPIRATORY (INHALATION) at 07:55

## 2019-05-01 RX ADMIN — BRIMONIDINE TARTRATE 1 DROP: 2 SOLUTION/ DROPS OPHTHALMIC at 20:27

## 2019-05-01 RX ADMIN — ENOXAPARIN SODIUM 40 MG: 40 INJECTION SUBCUTANEOUS at 20:26

## 2019-05-01 RX ADMIN — BRIMONIDINE TARTRATE 1 DROP: 2 SOLUTION/ DROPS OPHTHALMIC at 08:03

## 2019-05-01 RX ADMIN — PIPERACILLIN SODIUM,TAZOBACTAM SODIUM 3.38 G: 3; .375 INJECTION, POWDER, FOR SOLUTION INTRAVENOUS at 07:58

## 2019-05-01 RX ADMIN — DEXTROSE MONOHYDRATE AND SODIUM CHLORIDE 75 ML/HR: 5; .45 INJECTION, SOLUTION INTRAVENOUS at 18:00

## 2019-05-01 RX ADMIN — Medication 10 ML: at 14:41

## 2019-05-01 RX ADMIN — LATANOPROST 1 DROP: 50 SOLUTION OPHTHALMIC at 23:47

## 2019-05-01 RX ADMIN — LINEZOLID 600 MG: 600 INJECTION, SOLUTION INTRAVENOUS at 10:34

## 2019-05-01 RX ADMIN — DEXTROSE MONOHYDRATE AND SODIUM CHLORIDE 75 ML/HR: 5; .45 INJECTION, SOLUTION INTRAVENOUS at 00:00

## 2019-05-01 RX ADMIN — IPRATROPIUM BROMIDE AND ALBUTEROL SULFATE 3 ML: .5; 3 SOLUTION RESPIRATORY (INHALATION) at 13:28

## 2019-05-01 RX ADMIN — POTASSIUM CHLORIDE 10 MEQ: 10 INJECTION, SOLUTION INTRAVENOUS at 09:47

## 2019-05-01 NOTE — ROUTINE PROCESS
Interdisciplinary team rounds were held 5/1/2019 with the following team members:Care Management, Nursing, Pharmacy, Physical Therapy and Physician and the patient. Plan of care discussed. See clinical pathway and/or care plan for interventions and desired outcomes. Plan: palliative to discuss with family about PEG vs. Hospice.

## 2019-05-01 NOTE — PROGRESS NOTES
[facility] 
[address] [facility], [address] [place] [address] 
[phone] Medical Progress Note NAME: Griffin Hubbard :  1940 MRM:  [id number] Date/Time: 2019  8:49 PM 
 
  
Subjective: Chief Complaint:  Pt non-verbal. No family at the bedside. Pt seen and examined. Chart reviewed. Spoke with pt's granddaughter with Dr. Lilian Bradley at the bedside. He discussed findings of speech eval and goals of care with pt's granddaughter. She was very eloquent and stated that she understands her grandmother has lived far longer than anyone had expected and that a PEG tube doesn't eliminate the risk of aspiration and removes the comfort of PO feeds. She stated if it was solely her choice she would opt for comfort feeds and hospice. Objective:  
 
 
Vitals:  
 
 
  
Last 24hrs VS reviewed since prior progress note. Most recent are: 
 
Visit Vitals BP 93/54 (BP 1 Location: Left arm, BP Patient Position: At rest) Pulse (!) 104 Temp 98.4 °F (36.9 °C) Resp 17 Ht 5' 6\" (1.676 m) Wt 61.2 kg (135 lb) SpO2 93% BMI 21.79 kg/m² SpO2 Readings from Last 6 Encounters:  
19 93% 17 93% 17 97% 16 95% 16 99% 16 91% Intake/Output Summary (Last 24 hours) at 2019 1404 Last data filed at 2019 0583 Gross per 24 hour Intake 2291.25 ml Output 750 ml Net 1541.25 ml Exam:  
 
Physical Exam: 
 
Gen: Chronically ill appearing female HEENT:  Pink conjunctivae, PERRL, hearing intact to voice, moist mucous membranes Neck:  Supple, without masses, thyroid non-tender Resp: Coarse breath sounds Card:  No murmurs, normal S1, S2 without thrills, bruits or peripheral edema Abd:  Soft, non-tender, non-distended, normoactive bowel sounds are present Musc:  No cyanosis or clubbing Skin:  No rashes or ulcers, skin turgor is good Neuro: Contracted. R sided hemiparesis. Non-verbal. Does not track.  Does not follow commands Psych: Poor insight Medications Reviewed: (see below) Lab Data Reviewed: (see below) 
 
______________________________________________________________________ Medications:  
 
Current Facility-Administered Medications Medication Dose Route Frequency  piperacillin-tazobactam (ZOSYN) 3.375 g in 0.9% sodium chloride (MBP/ADV) 100 mL  3.375 g IntraVENous Q8H  
 dextrose 5 % - 0.45% NaCl infusion  75 mL/hr IntraVENous CONTINUOUS  
 hydrALAZINE (APRESOLINE) 20 mg/mL injection 10 mg  10 mg IntraVENous Q6H PRN  
 morphine injection 1 mg  1 mg IntraVENous Q2H PRN  
 ondansetron (ZOFRAN) injection 4 mg  4 mg IntraVENous Q6H PRN  
 guaiFENesin (ROBITUSSIN) 100 mg/5 mL oral liquid 100 mg  100 mg Oral Q4H PRN  
 albuterol-ipratropium (DUO-NEB) 2.5 MG-0.5 MG/3 ML  3 mL Nebulization Q6H RT  
 sodium chloride (NS) flush 5-40 mL  5-40 mL IntraVENous Q8H  
 sodium chloride (NS) flush 5-40 mL  5-40 mL IntraVENous PRN  
 acetaminophen (TYLENOL) tablet 650 mg  650 mg Oral Q6H PRN  
 naloxone (NARCAN) injection 0.4 mg  0.4 mg IntraVENous PRN  
 brimonidine (ALPHAGAN) 0.2 % ophthalmic solution 1 Drop  1 Drop Right Eye BID  dorzolamide-timolol (COSOPT) 22.3-6.8 mg/mL ophthalmic solution 1 Drop  1 Drop Right Eye BID  geriatric multivitamins & minerals (ELDERTONIC) oral elixir 15 mL  15 mL Oral TIDAC  latanoprost (XALATAN) 0.005 % ophthalmic solution 1 Drop  1 Drop Right Eye QHS  enoxaparin (LOVENOX) injection 40 mg  40 mg SubCUTAneous Q24H Lab Review:  
 
Recent Labs 05/01/19 
0591 04/30/19 
8401 04/30/19 
0118 WBC 10.6 8.8 9.1 HGB 12.4 12.4 12.1 HCT 38.2 40.0 39.2  185 174 Recent Labs 05/01/19 
0337 04/30/19 
0343 04/30/19 
0118 04/29/19 
0300  04/28/19 
1902  145 142 147*  --  147* K 3.6 5.0 5.9* 3.4*  --  4.1 * 112* 113* 118*  --  116* CO2 22 29 26 26  --  28  
* 133* 237* 124*  --  122* BUN 13 19 19 24*  --  29* CREA 0.80 0.96 0.96 0.73  --  1.00  
CA 8.3* 8.5 7.9* 7.1*  --  8.6 MG 1.6 2.0 1.9 1.8   < >  --   
PHOS  --   --   --  1.8*  --   --   
ALB  --   --   --  2.1*  --  2.8* SGOT  --   --   --  18  --  27 ALT  --   --   --  25  --  36 INR  --   --   --   --   --  1.1  
 < > = values in this interval not displayed. No components found for: [place] Pneumonia: concerning for PNA. Likely aspiration 
-continue IV antibiotics; de-escalated to Zosyn only -appreciate speech eval; awaiting family decision re: comfort feeds. Pt remains at risk for aspiration and PEG would NOT eliminate that. Family to decide re: PEG v. Comfort feeds and potentially hospice  
-palliative care on board to assist with goals of care discussions Parainfluenza 
-supportive care 
-droplet precautions  
  
  UTI (urinary tract infection): given hx of VRE. Urine cultures with fluoroquinolone resistant E.coli 
-continue zosyn  
-stopped linezolid as no VRE  
  
Elevated troponin: suspect strain, demand ischemia 
-CTA chest with ?chronic PE. Per pulm, no large occlusion or RV strain. No AC at this time  
  
  Chronic diastolic heart failure / Mitral regurgitation / Hypertrophic cardiomyopathy: proBNP does not appear to be accurately reflecting volume status. Actually appears IVVD  
-see below Hypernatremia  
-improved with hypotonic fluids Hypokalemia - repleted 
-stop K in the IVF's  
  
  Paroxysmal atrial fibrillation:  
-on metoprolol however currently NPO as per speech. BP's also would not tolerate at this time  
  
  CAD (coronary artery disease): not on ASA or statin 
-see above re: metoprolol  
-hydralazine PRN  
  
  Glaucoma:  
-Alphagan, Cosopt, Xalatan 
  
  Cerebral atrophy / Dementia / History of CVA: nonverbal at baseline. See above, awaiting family decision re: PEG v. Comfort feeds and possibly hospice  
  
Total time spent with patient: 35 Minutes Total time spent with patient: Lovenox Care Plan discussed with: Patient, RN. Palliative, pt's granddaughter Discussed:  Care plan Prophylaxis:  Lovenox Disposition:  TBD        
___________________________________________________ Attending Physician: Torrey Boss MD

## 2019-05-01 NOTE — PROGRESS NOTES
Problem: Dysphagia (Adult) Goal: *Acute Goals and Plan of Care (Insert Text) Description Swallowing goals initiated 4-29-19:  
1) MBS when more alert and receptive to PO trials.  -discontinued-not a good MBS candidate 2) MD and family to discuss feed for comfort vs PEG Outcome: Not Met SPEECH LANGUAGE PATHOLOGY DYSPHAGIA TREATMENT Patient: Birdie Noyola (25 y.o. female) Date: 5/1/2019 Diagnosis: Pneumonia [J18.9] <principal problem not specified> Precautions: aspiration ASSESSMENT: 
Patient awake, but swallow moderately impaired. Granddaughter understanding aspiration risks and verbalizes that patient is still not at baseline, but understands the progressive nature of dysphagia. WAnted to talk with MDs about prognosis and possible hospice. Progression toward goals: 
?         Improving appropriately and progressing toward goals ? Improving slowly and progressing toward goals ? Not making progress toward goals and plan of care will be adjusted PLAN: 
Recommendations and Planned Interventions: NPO while decisions pending: comfort care feedings vs PEG Patient continues to benefit from skilled intervention to address the above impairments. Continue treatment per established plan of care. Discharge Recommendations:  Juan David Kwok for comfort care feeding vs PEG SUBJECTIVE:  
Patient stated ?argh? .-when granddaughter trying to feed her OBJECTIVE:  
Cognitive and Communication Status: 
Neurologic State: Alert, Confused, Irritable Orientation Level: Unable to verbalize Cognition: Unable to assess (comment), No command following Perception: (difficult to assess. pateint with glaucoma) Perseveration: No perseveration noted Safety/Judgement: Lack of insight into deficits Dysphagia Treatment: 
Oral Assessment: P.O. Trials: 
Patient Position: upright in bed Vocal quality prior to P.O.: No impairment Consistency Presented: Puree; Thin liquid How Presented: Cup/sip;Spoon(family fed) ORAL PHASE:  
Had family try and feed her She attempted to give her the spoon several times. Patient with reduced oral opening and holding her mouth shut. She did appear to have fair a-p propulsion and clearance once initiated, but uncertain due to inability to open her mouth for checking Granddaughter gave her sips from the cup. Oral leakage x1 PHARYNGEAL PHASE: 
Moderate weakness Mild delay. No overt s/s but risk factors due to reduced responses. Discussed with granddaughter: Aspiration risk based on research, includin) feeder status 2) dependence for oral care and mostly likely, lack of oral care. 3) reduced dentitia 4) premorbid dyspahgia fromCVA Granddaughter states that patient is usually more receptive to PO , but PO intake only fair at home. FAmily tried to feed, b/c she does not respond well to RNs at [facility] She understands her aspiration risk and her high likelihood of readmission. We discussed PEG adding to her risk factors for aspiration due to sedentary status and potential for regurgitation. Exercises: 
Laryngeal Exercises: 
  
  
  
  
  
  
  
  
  
  
  
  
  
  
  
  
  
  
  
  
  
  
  
  
  
  
  
  
  
  
  
  
  
  
  
  
  
  
  
  
  
  
  
Pain: 
Pain Scale 1: Adult Nonverbal Pain Scale Pain Intensity 1: 0 After treatment:  
?              Patient left in no apparent distress sitting up in chair ? Patient left in no apparent distress in bed 
? Call bell left within reach ? Nursing notified ? Caregiver present ? Bed alarm activated COMMUNICATION/EDUCATION:  
Patient was educated regarding Her deficit(s) of dysphagia  as this relates to Her diagnosis of h/oCVA. She demonstrated Poor - Terminal understanding as evidenced by dementia. Granddaughter understood. Nancy Gonzales The patient?s plan of care including recommendations, planned interventions, and recommended diet changes were discussed with: Registered Nurse and Physician. ? Posted safety precautions in patient's room. BETHANIE Fink Time Calculation: 20 mins

## 2019-05-01 NOTE — PROGRESS NOTES
Bedside shift change report given to Anali Blancas (oncoming nurse) by Rahul Jin (offgoing nurse). Report included the following information SBAR, Kardex, MAR and Recent Results.

## 2019-05-01 NOTE — PERIOP NOTES
[facility] Name: Anabella Atkins MRN: [id number] : 1940 Hospital: [address] Date: 2019 Impression Plan 1. Encephalopathy 2. Urinary tract infection 3. RLL collapse due to aspiration/poor secretion mobilization 4. Parainfluenza 3 
5. Chronic aspiration 6. Dementia · Chest imaging more consistent with mucus plug due to aspiration vs. Poor secretion mobilization. It is possible that pt started as a URI/PNA and mucus production from this causing the CT chest findings vs. Aspiration alone. · Speech following. · Palliative following since pt may need PEG moving forward; family considering PEG vs hospice · D5 1/2 NS UVF · Continue zosyn/levoquin · Chest PT 
· Mucinex · Duonebs · Urine Cx with GNR · Linezolide added for VRE UTI hx 
· ? Of chronic PE but not with large occlusion or obvious chronic RV strain. Echo with no RV strain. Would repeat CTA chest in 6 weeks . Would not treat with AC at this time. Radiology 
(personally reviewed) CT chest showed a partially collpased RLL with severe scoliosis of the chest wall. Per radiology there is a note of old pulmonary embolism vs. LAD, however difficult to distinguish. ABG No results for input(s): PHI, PO2I, PCO2I in the last 72 hours. Subjective Cc: lethargy 65 yo with PMHx of dementia, CVA and CAD who presented with increased lethargy from her nursing home. Pt is unable to provide further hx due to dementia and AMS. Per notes associated with cough and fevers. Pt does get fed purees at the nursing home. Currently she is on RA. CT chest showed a partially collpased RLL with severe scoliosis of the chest wall. Per radiology there is a note of old pulmonary embolism vs. LAD, however difficult to distinguish. Interval history: 
Awake this morning. Will not track. Weak cough. RVP Parainfluenza + Echo with EF 50-55%, global hypokinesis, RV nl, trace tricuspid regurg. K 3.6 proBNP 8241 - better ROS: Unable to obtain due to patient medication condition. Past Medical History:  
Diagnosis Date  CAD (coronary artery disease)  Carotid occlusion, left 10/25/2016  Cerebral atrophy 10/25/2016  Dementia  Depression  Glaucoma  Hx of completed stroke  Hyperlipidemia LDL goal <70 10/25/2016  Hypertension  Hypertrophic cardiomyopathy ([facility] [address] 6/1/2012  Hypertrophic subaortic stenosis (idiopathic) (HCC)  Mitral regurgitation  Paroxysmal atrial fibrillation ([facility] [address] 11/29/2011 Past Surgical History:  
Procedure Laterality Date [address]  2004 Normal cors, EF 60%, significant MR. EDP 23  
 CARDIAC SURG PROCEDURE UNLIST  ECHO 2D ADULT  8/20/2010 HCM, Resting LVOT gradient 129 mmHg, grade 3-4 diastolic dysfunction, MAC, mod-severe MR, marked LAE.  ECHO 2D ADULT  5/27/11  
 mod-severe LVH, EF 65%, grade 3-4 diastolic dysfunction, LVOT resting gradient 42 mmHg, severe LAE, mod-severe MR, PA 46 mmHg Prior to Admission medications Medication Sig Start Date End Date Taking? Authorizing Provider  
metoprolol tartrate (LOPRESSOR) 25 mg tablet Take 75 mg by mouth two (2) times a day. Yes Provider, Historical  
potassium chloride SR (KLOR-CON 10) 10 mEq tablet Take 20 mEq by mouth daily. Yes Provider, Historical  
loperamide (IMODIUM) 2 mg capsule Take 2 mg by mouth as needed for Diarrhea. Yes Provider, Historical  
geriatric multivitamins & minerals (ELDERTONIC) elix Take 15 mL by mouth Before breakfast, lunch, and dinner. Yes Provider, Historical  
bimatoprost (LUMIGAN) 0.03 % ophthalmic drops Administer 1 Drop to right eye nightly. Yes Provider, Historical  
brimonidine (ALPHAGAN) 0.2 % ophthalmic solution Administer 1 Drop to right eye two (2) times a day.    Yes Provider, Historical  
 acetaminophen (TYLENOL) 650 mg suppository Insert 650 mg into rectum every four (4) hours as needed for Fever. Yes Provider, Historical  
acetaminophen (TYLENOL) 325 mg tablet Take 325 mg by mouth every four (4) hours as needed for Pain or Fever. Yes Provider, Historical  
bumetanide (BUMEX) 0.5 mg tablet Take 0.25 mg by mouth three (3) days a week. Patient takes on Monday, Wednesday, Friday    Yes Provider, Historical  
memantine ER (NAMENDA XR) 28 mg capsule Take 1 Cap by mouth daily. 9/21/16  Yes Ava EASTON NP  
dorzolamide-timolol (COSOPT) 2-0.5 % ophthalmic solution Administer 1 Drop to right eye two (2) times a day. Yes Provider, Historical  
 
Current Facility-Administered Medications Medication Dose Route Frequency  piperacillin-tazobactam (ZOSYN) 3.375 g in 0.9% sodium chloride (MBP/ADV) 100 mL  3.375 g IntraVENous Q8H  potassium chloride 10 mEq in 100 ml IVPB  10 mEq IntraVENous Q1H  
 dextrose 5 % - 0.45% NaCl infusion  75 mL/hr IntraVENous CONTINUOUS  
 levoFLOXacin (LEVAQUIN) 750 mg in D5W IVPB  750 mg IntraVENous Q24H  
 albuterol-ipratropium (DUO-NEB) 2.5 MG-0.5 MG/3 ML  3 mL Nebulization Q6H RT  
 sodium chloride (NS) flush 5-40 mL  5-40 mL IntraVENous Q8H  
 brimonidine (ALPHAGAN) 0.2 % ophthalmic solution 1 Drop  1 Drop Right Eye BID  dorzolamide-timolol (COSOPT) 22.3-6.8 mg/mL ophthalmic solution 1 Drop  1 Drop Right Eye BID  geriatric multivitamins & minerals (ELDERTONIC) oral elixir 15 mL  15 mL Oral TIDAC  linezolid in dextrose 5% (ZYVOX) IVPB premix in D5W 600 mg  600 mg IntraVENous Q12H  
 latanoprost (XALATAN) 0.005 % ophthalmic solution 1 Drop  1 Drop Right Eye QHS  enoxaparin (LOVENOX) injection 40 mg  40 mg SubCUTAneous Q24H No Known Allergies Social History Tobacco Use  Smoking status: Never Smoker  Smokeless tobacco: Never Used Substance Use Topics  Alcohol use: No  
  Alcohol/week: 0.0 oz Family History Problem Relation Age of Onset  Other Other   
     patient specifically denies the following in 1st degree relative: HTN, DM, CVA, CAD, ca  
 Heart Disease Mother Laboratory: I have personally reviewed flowsheet and labs. Recent Labs 05/01/19 
8227 04/30/19 
7739 04/30/19 
0118 WBC 10.6 8.8 9.1 HGB 12.4 12.4 12.1 HCT 38.2 40.0 39.2  185 174 Recent Labs 05/01/19 
0337 04/30/19 
0343 04/30/19 
0118 04/29/19 
0300  04/28/19 
1902  145 142 147*  --  147* K 3.6 5.0 5.9* 3.4*  --  4.1 * 112* 113* 118*  --  116* CO2 22 29 26 26  --  28  
* 133* 237* 124*  --  122* BUN 13 19 19 24*  --  29* CREA 0.80 0.96 0.96 0.73  --  1.00  
CA 8.3* 8.5 7.9* 7.1*  --  8.6 MG 1.6 2.0 1.9 1.8   < >  --   
PHOS  --   --   --  1.8*  --   --   
ALB  --   --   --  2.1*  --  2.8* SGOT  --   --   --  18  --  27 ALT  --   --   --  25  --  36 INR  --   --   --   --   --  1.1  
 < > = values in this interval not displayed. Objective:  
 
Visit Vitals BP 93/54 (BP 1 Location: Left arm, BP Patient Position: At rest) Pulse (!) 104 Temp 98.4 °F (36.9 °C) Resp 17 Ht 5' 6\" (1.676 m) Wt 61.2 kg (135 lb) SpO2 93% BMI 21.79 kg/m² Intake/Output Summary (Last 24 hours) at 5/1/2019 4219 Last data filed at 5/1/2019 0276 Gross per 24 hour Intake 2291.25 ml Output 750 ml Net 1541.25 ml EXAM: 
GENERAL: elderly, opens eyes to stimuli and turns head. Moves arms. . Wet cough, HEENT:  anicteric, no alar flaring or epistaxis, oral mucosa moist without cyanosis, NECK:  no jugular vein distention, no retractions, no thyromegaly or masses, LUNGS: rhonci and rales on right side HEART:  Regular rate and rhythm with no MGR; no edema is present, ABDOMEN:  soft with no tenderness, bowel sounds present, EXTREMITIES:  warm with no cyanosis, SKIN:  no jaundice or ecchymosis and NEUROLOGIC:  alert but not interactive, contracted on right side [place], [address] [facility] 
[facility]

## 2019-05-01 NOTE — ROUTINE PROCESS
Bedside and Verbal shift change report given to Luis Bello RN (oncoming nurse) by Ad Urbano RN (offgoing nurse). Report included the following information SBAR, Kardex, MAR, Accordion and Recent Results.

## 2019-05-01 NOTE — PROGRESS NOTES
Palliative Medicine Consult Patient Name: Lucrecia Parnell YOB: 1940 Date of Initial Consult: 4/29/2019 Reason for Consult: Care decisions Requesting Provider: Dr. Amara Toth Primary Care Physician: Dana Lieberman MD 
  
 SUMMARY:  
Lucrecia Parnell is a 66 y.o. with a past history of stroke from October 2016, dysphasia secondary to stroke, paroxysmal A. fib, CHF, coronary disease, vascular dementia who was admitted on 4/28/2019 from [address] [place] [address] with a diagnosis of possible aspiration pneumonia, UTI. Current medical issues leading to Palliative Medicine involvement include: Care decisions. Past medical history/chart review patient is a 60-year-old female suffered a extensive stroke in October 2016. This is left her essentially bedbound, nonverbal, and on a puréed diet. She was seen by our team on several occasions but last visit was January 2017. At that time she was actually discharged home with [facility] [place]. Ultimately her family states she went to [address] [place] [address] where they attempted some rehab for about 30 days but now is essentially become a long-term care patient. She has remained nonverbal, nonambulatory, and requiring a puréed diet. According to her , he noticed some \"rattling\" in her chest and asked her to be sent to the hospital.  In the emergency room she had extensive work-up to include a CT scan of the chest that shows right lower lobe collapse/atelectasis, suspect recurrent aspiration. Speech therapy evaluation was attempted earlier today but unable to be completed secondary to mental status issues. According to family she is done okay on a puréed diet, but anytime attempts of feeding her something different were tried, she had symptoms of aspiration. Social history he is  and her  is at bedside. She has 4 adult children. PALLIATIVE DIAGNOSES:  
1. Goals of care discussion 2. Dysphasia with suspected aspiration related to a prior stroke and general debility. 3. Advanced care planning review 4. DNR discussion 5. Debility PLAN:  
1. Dr. Veronika Nguyen and I met with patient and granddaughter(Gautam Delaney). We reviewed the current medical issues particularly addressing the issue with dysphasia and likely recurrent aspiration. Reviewed potential options moving forward to include feeding for comfort/hospice care versus proceeding with a PEG placement. Granddaughter really leaning towards focusing on comfort but wants us to discuss with her . 2. Later, I was able to meet with patient's , daughter, and granddaughter altogether. We again reviewed the medical issues and options moving forward. Discussed potential transition for hospice with associated comfort eating versus placement of a PEG and some of the risk that may be involved with PEG placement. 3. Goals of care after reviewing options, family seems to agree on proceeding with hospice care at home.  is agreeable for her to return home with the support of hospice and the rest of the family. We did discuss the limitations of hospice care at home and although they have somebody on call 24/7 there is not somebody there 24/7. We also discussed hospice care at the nursing home facility. After this review, family seems ready to bring her home with hospice. She briefly was on hospice care for approximately 2 weeks before her transition to skilled nursing level care and at that time, used [facility] [place]. We will place a consult to [facility] and asked them to reach out to patient's granddaughter. 4. Advance care planning patient has not completed an advanced medical directive and her  understands that he would service her medical power of . 5. CODE STATUS reviewed resuscitation and what CPR would entail.   The patient's  had already signed a DO NOT RESUSCITATE form in 2016. He confirms those wishes 6. Symptom managementno acute symptoms for us to manage at this time but consideration for scopolamine patch if she starts to eat for comfort because I suspect she will have increased secretions. 7. Psychosocial patient with excellent support from family. No spiritual concerns. 8. Discussed with bedside nurse, Dr. Valentín Garcia,  9. Initial consult note routed to primary continuity provider 10. Communicated plan of care with: Palliative IDT 
 
 
 GOALS OF CARE / TREATMENT PREFERENCES:  
[====Goals of Care====] GOALS OF CARE: 
Patient/Health Care Proxy Stated Goals: Other (comment)(Having to debate possible PEG) TREATMENT PREFERENCES:  
Code Status: DNR Advance Care Planning: 
Advance Care Planning 1/10/2017 Patient's Healthcare Decision Maker is: Legal Next of Kin Primary Decision Maker Name Fransisco Aj Primary Decision Maker Phone Number [phone] Primary Decision Maker Relationship to Patient Spouse Secondary Decision Maker Name - Secondary Decision Maker Phone Number - Secondary Decision Maker Relationship to Patient -  
Confirm Advance Directive - Patient Would Like to Complete Advance Directive - Does the patient have other document types Do Not Resuscitate Medical Interventions: Limited additional interventions Other Instructions: The palliative care team has discussed with patient / health care proxy about goals of care / treatment preferences for patient. 
[====Goals of Care====] HISTORY:  
 
History obtained from: Chart, , daughter-in-law CHIEF COMPLAINT: Right Sola HPI/SUBJECTIVE: The patient is:  
[] Verbal and participatory [x] Non-participatory due to: Stroke 4/30 patient remains essentially nonverbal.  She is much more awake looking around the room and holding on the family's hands 5/1 patient at times appears slightly more interactive but sleeping at other times. Clinical Pain Assessment (nonverbal scale for severity on nonverbal patients):  
Clinical Pain Assessment Severity: 0 Adult Nonverbal Pain Scale Face: No particular expression or smile Activity (Movement): Lying quietly, normal position Guarding: Lying quietly, no positioning of hands over areas of body Physiology (Vital Signs): Stable vital signs Respiratory: Baseline RR/SpO2 compliant with ventilator Total Score: 0 
 
 
 FUNCTIONAL ASSESSMENT:  
 
Palliative Performance Scale (PPS): PPS: 40 PSYCHOSOCIAL/SPIRITUAL SCREENING:  
 
Advance Care Planning: 
Advance Care Planning 1/10/2017 Patient's Healthcare Decision Maker is: Legal Next of Kin Primary Decision Maker Name Jeff Arcos Primary Decision Maker Phone Number [phone] Primary Decision Maker Relationship to Patient Spouse Secondary Decision Maker Name - Secondary Decision Maker Phone Number - Secondary Decision Maker Relationship to Patient -  
Confirm Advance Directive - Patient Would Like to Complete Advance Directive - Does the patient have other document types Do Not Resuscitate Any spiritual / Congregation concerns: 
[] Yes /  [x] No 
 
Caregiver Burnout: 
[] Yes /  [x] No /  [] No Caregiver Present Anticipatory grief assessment:  
[x] Normal  / [] Maladaptive ESAS Anxiety: Anxiety: 0 
 
ESAS Depression: Depression: 0 REVIEW OF SYSTEMS:  
 
Positive and pertinent negative findings in ROS are noted above in HPI. The following systems were [x] reviewed / [] unable to be reviewed as noted in HPI Other findings are noted below. Systems: constitutional, ears/nose/mouth/throat, respiratory, gastrointestinal, genitourinary, musculoskeletal, integumentary, neurologic, psychiatric, endocrine. Positive findings noted below. Modified ESAS Completed by: provider Fatigue: 3 Drowsiness: 1 Depression: 0 Pain: 0  
 Anxiety: 0 Nausea: 0 Anorexia: 2 Dyspnea: 3 Constipation: No  
  Stool Occurrence(s): 1 PHYSICAL EXAM:  
 
From RN flowsheet: 
Wt Readings from Last 3 Encounters:  
04/29/19 135 lb (61.2 kg) 01/09/17 136 lb (61.7 kg) 01/03/17 150 lb (68 kg) Blood pressure 104/63, pulse 92, temperature 98.5 °F (36.9 °C), resp. rate 16, height 5' 6\" (1.676 m), weight 135 lb (61.2 kg), SpO2 96 %. Pain Scale 1: Adult Nonverbal Pain Scale Pain Intensity 1: 0 Last bowel movement, if known:  
 
Constitutional: Resting during most of our visit Eyes: pupils equal, anicteric ENMT: no nasal discharge, moist mucous membranes Cardiovascular: regular rhythm, distal pulses intact Respiratory: breathing not labored, decreased at the right base, secretions noted. Gastrointestinal: soft non-tender, +bowel sounds Musculoskeletal: no deformity, no tenderness to palpation Skin: warm, dry Neurologic: following some commands Psychiatric: Nonverbal 
Other: 
 
 
 HISTORY:  
 
Active Problems: 
  Mitral regurgitation (10/22/2010) Chronic diastolic heart failure ([address] (2/26/2011) Paroxysmal atrial fibrillation ([address] (11/29/2011) Hypertrophic cardiomyopathy ([facility] (6/1/2012) Glaucoma (1/13/2014) Cerebral atrophy (10/25/2016) Dementia (10/25/2016) CAD (coronary artery disease) () Hx of completed stroke (4/28/2019) Elevated troponin (4/28/2019) Pneumonia (4/28/2019) UTI (urinary tract infection) (4/28/2019) Past Medical History:  
Diagnosis Date  CAD (coronary artery disease)  Carotid occlusion, left 10/25/2016  Cerebral atrophy 10/25/2016  Dementia  Depression  Glaucoma  Hx of completed stroke  Hyperlipidemia LDL goal <70 10/25/2016  Hypertension  Hypertrophic cardiomyopathy ([address] 6/1/2012  Hypertrophic subaortic stenosis (idiopathic) (HCC)  Mitral regurgitation  Paroxysmal atrial fibrillation ([facility] 11/29/2011 Past Surgical History:  
Procedure Laterality Date [address]  2004 Normal cors, EF 60%, significant MR. EDP 23  
 CARDIAC SURG PROCEDURE UNLIST  ECHO 2D ADULT  8/20/2010 HCM, Resting LVOT gradient 129 mmHg, grade 3-4 diastolic dysfunction, MAC, mod-severe MR, marked LAE.  ECHO 2D ADULT  5/27/11  
 mod-severe LVH, EF 65%, grade 3-4 diastolic dysfunction, LVOT resting gradient 42 mmHg, severe LAE, mod-severe MR, PA 46 mmHg Family History Problem Relation Age of Onset  Other Other   
     patient specifically denies the following in 1st degree relative: HTN, DM, CVA, CAD, ca  
 Heart Disease Mother History reviewed, no pertinent family history. Social History Tobacco Use  Smoking status: Never Smoker  Smokeless tobacco: Never Used Substance Use Topics  Alcohol use: No  
  Alcohol/week: 0.0 oz No Known Allergies Current Facility-Administered Medications Medication Dose Route Frequency  piperacillin-tazobactam (ZOSYN) 3.375 g in 0.9% sodium chloride (MBP/ADV) 100 mL  3.375 g IntraVENous Q8H  
 dextrose 5 % - 0.45% NaCl infusion  75 mL/hr IntraVENous CONTINUOUS  
 hydrALAZINE (APRESOLINE) 20 mg/mL injection 10 mg  10 mg IntraVENous Q6H PRN  
 morphine injection 1 mg  1 mg IntraVENous Q2H PRN  
 ondansetron (ZOFRAN) injection 4 mg  4 mg IntraVENous Q6H PRN  
 guaiFENesin (ROBITUSSIN) 100 mg/5 mL oral liquid 100 mg  100 mg Oral Q4H PRN  
 albuterol-ipratropium (DUO-NEB) 2.5 MG-0.5 MG/3 ML  3 mL Nebulization Q6H RT  
 sodium chloride (NS) flush 5-40 mL  5-40 mL IntraVENous Q8H  
 sodium chloride (NS) flush 5-40 mL  5-40 mL IntraVENous PRN  
 acetaminophen (TYLENOL) tablet 650 mg  650 mg Oral Q6H PRN  
 naloxone (NARCAN) injection 0.4 mg  0.4 mg IntraVENous PRN  
 brimonidine (ALPHAGAN) 0.2 % ophthalmic solution 1 Drop  1 Drop Right Eye BID  
  dorzolamide-timolol (COSOPT) 22.3-6.8 mg/mL ophthalmic solution 1 Drop  1 Drop Right Eye BID  geriatric multivitamins & minerals (ELDERTONIC) oral elixir 15 mL  15 mL Oral TIDAC  latanoprost (XALATAN) 0.005 % ophthalmic solution 1 Drop  1 Drop Right Eye QHS  enoxaparin (LOVENOX) injection 40 mg  40 mg SubCUTAneous Q24H  
 
 
 
 LAB AND IMAGING FINDINGS:  
 
Lab Results Component Value Date/Time WBC 10.6 05/01/2019 03:37 AM  
 HGB 12.4 05/01/2019 03:37 AM  
 PLATELET 313 92/12/7462 03:37 AM  
 
Lab Results Component Value Date/Time Sodium 140 05/01/2019 03:37 AM  
 Potassium 3.6 05/01/2019 03:37 AM  
 Chloride 109 (H) 05/01/2019 03:37 AM  
 CO2 22 05/01/2019 03:37 AM  
 BUN 13 05/01/2019 03:37 AM  
 Creatinine 0.80 05/01/2019 03:37 AM  
 Calcium 8.3 (L) 05/01/2019 03:37 AM  
 Magnesium 1.6 05/01/2019 03:37 AM  
 Phosphorus 1.8 (L) 04/29/2019 03:00 AM  
  
Lab Results Component Value Date/Time AST (SGOT) 18 04/29/2019 03:00 AM  
 Alk. phosphatase 51 04/29/2019 03:00 AM  
 Protein, total 5.4 (L) 04/29/2019 03:00 AM  
 Albumin 2.1 (L) 04/29/2019 03:00 AM  
 Globulin 3.3 04/29/2019 03:00 AM  
 
Lab Results Component Value Date/Time INR 1.1 04/28/2019 07:02 PM  
 Prothrombin time 10.8 04/28/2019 07:02 PM  
 aPTT 23.1 04/28/2019 07:02 PM  
  
Lab Results Component Value Date/Time Iron 49 01/15/2014 11:45 AM  
 TIBC 252 01/15/2014 11:45 AM  
 Iron % saturation 19 (L) 01/15/2014 11:45 AM  
 Ferritin 84 01/15/2014 11:45 AM  
  
No results found for: PH, PCO2, PO2 No components found for: [place] Lab Results Component Value Date/Time CK 30 04/30/2019 03:43 AM  
 CK - MB 1.8 04/30/2019 03:43 AM  
  
 
 
   
 
Total time:35 Counseling / coordination time, spent as noted above: 30 
> 50% counseling / coordination?: yes Prolonged service was provided for  []30 min   []75 min in face to face time in the presence of the patient, spent as noted above. Time Start:  
Time End: Note: this can only be billed with 36493 (initial) or 63811 (follow up). If multiple start / stop times, list each separately.

## 2019-05-02 VITALS
DIASTOLIC BLOOD PRESSURE: 84 MMHG | OXYGEN SATURATION: 96 % | TEMPERATURE: 98 F | BODY MASS INDEX: 21.69 KG/M2 | HEART RATE: 79 BPM | HEIGHT: 66 IN | RESPIRATION RATE: 16 BRPM | WEIGHT: 135 LBS | SYSTOLIC BLOOD PRESSURE: 145 MMHG

## 2019-05-02 PROCEDURE — 74011000258 HC RX REV CODE- 258: Performed by: INTERNAL MEDICINE

## 2019-05-02 PROCEDURE — 74011000250 HC RX REV CODE- 250: Performed by: INTERNAL MEDICINE

## 2019-05-02 PROCEDURE — 94668 MNPJ CHEST WALL SBSQ: CPT

## 2019-05-02 PROCEDURE — 94640 AIRWAY INHALATION TREATMENT: CPT

## 2019-05-02 PROCEDURE — 77030038269 HC DRN EXT URIN PURWCK BARD -A

## 2019-05-02 PROCEDURE — 74011250636 HC RX REV CODE- 250/636: Performed by: INTERNAL MEDICINE

## 2019-05-02 RX ADMIN — PIPERACILLIN SODIUM,TAZOBACTAM SODIUM 3.38 G: 3; .375 INJECTION, POWDER, FOR SOLUTION INTRAVENOUS at 15:47

## 2019-05-02 RX ADMIN — IPRATROPIUM BROMIDE AND ALBUTEROL SULFATE 3 ML: .5; 3 SOLUTION RESPIRATORY (INHALATION) at 14:41

## 2019-05-02 RX ADMIN — Medication 10 ML: at 13:52

## 2019-05-02 RX ADMIN — DORZOLAMIDE HYDROCHLORIDE AND TIMOLOL MALEATE 1 DROP: 20; 5 SOLUTION OPHTHALMIC at 08:32

## 2019-05-02 RX ADMIN — PIPERACILLIN SODIUM,TAZOBACTAM SODIUM 3.38 G: 3; .375 INJECTION, POWDER, FOR SOLUTION INTRAVENOUS at 08:32

## 2019-05-02 RX ADMIN — BRIMONIDINE TARTRATE 1 DROP: 2 SOLUTION/ DROPS OPHTHALMIC at 08:32

## 2019-05-02 RX ADMIN — IPRATROPIUM BROMIDE AND ALBUTEROL SULFATE 3 ML: .5; 3 SOLUTION RESPIRATORY (INHALATION) at 07:56

## 2019-05-02 RX ADMIN — IPRATROPIUM BROMIDE AND ALBUTEROL SULFATE 3 ML: .5; 3 SOLUTION RESPIRATORY (INHALATION) at 01:58

## 2019-05-02 RX ADMIN — PIPERACILLIN SODIUM,TAZOBACTAM SODIUM 3.38 G: 3; .375 INJECTION, POWDER, FOR SOLUTION INTRAVENOUS at 00:12

## 2019-05-02 NOTE — DISCHARGE SUMMARY
Physician Discharge Summary     Patient ID:  Vimal Hermosillo  [id number]  89 y.o.  1940    Admit date: 4/28/2019    Discharge date and time: 5/2/2019    Admission Diagnoses: Pneumonia [J18.9]    Discharge Diagnoses/Hospital Course   Ms. Darrick Araya is a 65 yo AAF with PMH of chronic diastolic CHF, h/o CVA, dementia, CAD, PAF admitted for presumed aspiration PNA, UTI. She was noted to be Parainfluenza (+). She was started on IV antibiotics. Due to her mental status she was unable to safely start feeds. Palliative met with family re: goals of care and possibility of PEG tube feeds. Family clearly understood that it does not eliminate the risk of aspiration and remove ability for pt to feed by mouth. They opted for comfort measures, comfort feeds and discharge to home with home hospice. PCP: Meseret Frances MD     Consults: palliative, pulmonary    Discharge Exam:  Visit Vitals  /84 (BP 1 Location: Right arm, BP Patient Position: At rest)   Pulse 79   Temp 98 °F (36.7 °C)   Resp 16   Ht 5' 6\" (1.676 m)   Wt 61.2 kg (135 lb)   SpO2 96%   BMI 21.79 kg/m²     Gen: Chronically ill appearing female  HEENT:  Pink conjunctivae, PERRL, hearing intact to voice, moist mucous membranes  Neck:  Supple, without masses, thyroid non-tender  Resp: Coarse breath sounds but improved from admission   Card:  No murmurs, normal S1, S2 without thrills, bruits or peripheral edema  Abd:  Soft, non-tender, non-distended, normoactive bowel sounds are present  Musc:  No cyanosis or clubbing  Skin:  No rashes or ulcers, skin turgor is good  Neuro: Contracted. R sided hemiparesis. Non-verbal. Does not track.  Does not follow commands  Psych: Poor insight     Disposition: home    Patient Instructions:   Current Discharge Medication List      CONTINUE these medications which have NOT CHANGED    Details   loperamide (IMODIUM) 2 mg capsule Take 2 mg by mouth as needed for Diarrhea.      geriatric multivitamins & minerals (ELDERTONIC) elix Take 15 mL by mouth Before breakfast, lunch, and dinner. bimatoprost (LUMIGAN) 0.03 % ophthalmic drops Administer 1 Drop to right eye nightly. brimonidine (ALPHAGAN) 0.2 % ophthalmic solution Administer 1 Drop to right eye two (2) times a day. acetaminophen (TYLENOL) 650 mg suppository Insert 650 mg into rectum every four (4) hours as needed for Fever. acetaminophen (TYLENOL) 325 mg tablet Take 325 mg by mouth every four (4) hours as needed for Pain or Fever. dorzolamide-timolol (COSOPT) 2-0.5 % ophthalmic solution Administer 1 Drop to right eye two (2) times a day.          STOP taking these medications       metoprolol tartrate (LOPRESSOR) 25 mg tablet Comments:   Reason for Stopping:         potassium chloride SR (KLOR-CON 10) 10 mEq tablet Comments:   Reason for Stopping:         bumetanide (BUMEX) 0.5 mg tablet Comments:   Reason for Stopping:         memantine ER (NAMENDA XR) 28 mg capsule Comments:   Reason for Stopping:             Activity: Activity as tolerated  Diet: Comfort feeding    Approximate time spent in patient care on day of discharge: 35 minutes     Signed:  Mita Marquez MD  5/2/2019  4:03 PM

## 2019-05-02 NOTE — PROGRESS NOTES
5:20 PM 
CM set up transportation through [facility] for pu at 6:30pm.  Received authorization from [employer]. Ref. # [id number]  
 
2:04 PM 
CM spoke with granddaughter in pt's room. They wanted to know when pt could go home. Explained she is ready to go once they are set up with hospice and all equipment is in the home. She has been in contact with Tony Harris from [employer] and will call her back to tell her they would like to more forward. Once all is set, CM will set up transportation. Kimber Alcantara 1:13 PM 
Consult accepted from [facility]. Tony Harris will reach out to family to set up an info session. Hospice agency aware pt is ready to go once all is set up. Kimber Alcantara 9:37 AM  
CM consult noted for hospice. 1) Per order, Radha jimenez is the  for the hospice referral.  She can be              reached at [phone].   
 
2). Spoke to granddaughter who said they would like a referral sent to [facility].. They had        used them in the past and would like to use them again. Completed Choice Letter via phone 3). CM sent referral through [employer]. Called to f/u they will speak to family today. 4). Once hospice is set up, family would like pt to return home instead of returning to [facility] Kimber Alcantara

## 2019-05-02 NOTE — PERIOP NOTES
[facility] Name: Daryl Morris MRN: [id number] : 1940 Hospital: [address] Date: 2019 Impression Plan 1. Encephalopathy 2. Urinary tract infection 3. RLL collapse due to aspiration/poor secretion mobilization 4. Parainfluenza 3 
5. Chronic aspiration 6. Dementia · Chest imaging more consistent with mucus plug due to aspiration vs. Poor secretion mobilization. It is possible that pt started as a URI/PNA and mucus production from this causing the CT chest findings vs. Aspiration alone. · Speech following. · Palliative following since pt may need PEG moving forward; family considering PEG vs hospice · D5  NS IVF · Continue Zosyn through 5/3. Off Levaquin. · Chest PT 
· Mucinex · Duonebs · Urine Cx with GNR 
· Off Linezolide as no VRE 
· ? Of chronic PE but not with large occlusion or obvious chronic RV strain. Echo with no RV strain. Would repeat CTA chest in 6 weeks . Would not treat with AC at this time. Plans noted for transition to home hospice. We will sign off and be available as needed. Please call with questions. Radiology 
(personally reviewed) CT chest showed a partially collpased RLL with severe scoliosis of the chest wall. Per radiology there is a note of old pulmonary embolism vs. LAD, however difficult to distinguish. ABG No results for input(s): PHI, PO2I, PCO2I in the last 72 hours. Subjective Cc: lethargy 65 yo with PMHx of dementia, CVA and CAD who presented with increased lethargy from her nursing home. Pt is unable to provide further hx due to dementia and AMS. Per notes associated with cough and fevers. Pt does get fed purees at the nursing home. Currently she is on RA. CT chest showed a partially collpased RLL with severe scoliosis of the chest wall. Per radiology there is a note of old pulmonary embolism vs. LAD, however difficult to distinguish. Interval history: Awake this morning. Still will not track or consistently follow commands Weak cough. RVP Parainfluenza + Echo with EF 50-55%, global hypokinesis, RV nl, trace tricuspid regurg. ROS: Unable to obtain due to patient medication condition. Past Medical History:  
Diagnosis Date  CAD (coronary artery disease)  Carotid occlusion, left 10/25/2016  Cerebral atrophy 10/25/2016  Dementia  Depression  Glaucoma  Hx of completed stroke  Hyperlipidemia LDL goal <70 10/25/2016  Hypertension  Hypertrophic cardiomyopathy ([facility] 6/1/2012  Hypertrophic subaortic stenosis (idiopathic) (HCC)  Mitral regurgitation  Paroxysmal atrial fibrillation ([facility] 11/29/2011 Past Surgical History:  
Procedure Laterality Date [address]  2004 Normal cors, EF 60%, significant MR. EDP 23  
 CARDIAC SURG PROCEDURE UNLIST  ECHO 2D ADULT  8/20/2010 HCM, Resting LVOT gradient 129 mmHg, grade 3-4 diastolic dysfunction, MAC, mod-severe MR, marked LAE.  ECHO 2D ADULT  5/27/11  
 mod-severe LVH, EF 65%, grade 3-4 diastolic dysfunction, LVOT resting gradient 42 mmHg, severe LAE, mod-severe MR, PA 46 mmHg Prior to Admission medications Medication Sig Start Date End Date Taking? Authorizing Provider  
metoprolol tartrate (LOPRESSOR) 25 mg tablet Take 75 mg by mouth two (2) times a day. Yes Provider, Historical  
potassium chloride SR (KLOR-CON 10) 10 mEq tablet Take 20 mEq by mouth daily. Yes Provider, Historical  
loperamide (IMODIUM) 2 mg capsule Take 2 mg by mouth as needed for Diarrhea. Yes Provider, Historical  
geriatric multivitamins & minerals (ELDERTONIC) elix Take 15 mL by mouth Before breakfast, lunch, and dinner. Yes Provider, Historical  
bimatoprost (LUMIGAN) 0.03 % ophthalmic drops Administer 1 Drop to right eye nightly.    Yes Provider, Historical  
brimonidine (ALPHAGAN) 0.2 % ophthalmic solution Administer 1 Drop to right eye two (2) times a day. Yes Provider, Historical  
acetaminophen (TYLENOL) 650 mg suppository Insert 650 mg into rectum every four (4) hours as needed for Fever. Yes Provider, Historical  
acetaminophen (TYLENOL) 325 mg tablet Take 325 mg by mouth every four (4) hours as needed for Pain or Fever. Yes Provider, Historical  
bumetanide (BUMEX) 0.5 mg tablet Take 0.25 mg by mouth three (3) days a week. Patient takes on Monday, Wednesday, Friday    Yes Provider, Historical  
memantine ER (NAMENDA XR) 28 mg capsule Take 1 Cap by mouth daily. 9/21/16  Yes Karyn EASTON NP  
dorzolamide-timolol (COSOPT) 2-0.5 % ophthalmic solution Administer 1 Drop to right eye two (2) times a day. Yes Provider, Historical  
 
Current Facility-Administered Medications Medication Dose Route Frequency  piperacillin-tazobactam (ZOSYN) 3.375 g in 0.9% sodium chloride (MBP/ADV) 100 mL  3.375 g IntraVENous Q8H  
 dextrose 5 % - 0.45% NaCl infusion  75 mL/hr IntraVENous CONTINUOUS  
 albuterol-ipratropium (DUO-NEB) 2.5 MG-0.5 MG/3 ML  3 mL Nebulization Q6H RT  
 sodium chloride (NS) flush 5-40 mL  5-40 mL IntraVENous Q8H  
 brimonidine (ALPHAGAN) 0.2 % ophthalmic solution 1 Drop  1 Drop Right Eye BID  dorzolamide-timolol (COSOPT) 22.3-6.8 mg/mL ophthalmic solution 1 Drop  1 Drop Right Eye BID  geriatric multivitamins & minerals (ELDERTONIC) oral elixir 15 mL  15 mL Oral TIDAC  latanoprost (XALATAN) 0.005 % ophthalmic solution 1 Drop  1 Drop Right Eye QHS  enoxaparin (LOVENOX) injection 40 mg  40 mg SubCUTAneous Q24H No Known Allergies Social History Tobacco Use  Smoking status: Never Smoker  Smokeless tobacco: Never Used Substance Use Topics  Alcohol use: No  
  Alcohol/week: 0.0 oz Family History Problem Relation Age of Onset  Other Other   
     patient specifically denies the following in 1st degree relative: HTN, DM, CVA, CAD, ca  
 Heart Disease Mother Laboratory: I have personally reviewed flowsheet and labs. Recent Labs 05/01/19 
6548 04/30/19 
4271 04/30/19 
0118 WBC 10.6 8.8 9.1 HGB 12.4 12.4 12.1 HCT 38.2 40.0 39.2  185 174 Recent Labs 05/01/19 
6260 04/30/19 
3402 04/30/19 
0118  145 142  
K 3.6 5.0 5.9*  
* 112* 113* CO2 22 29 26 * 133* 237* BUN 13 19 19 CREA 0.80 0.96 0.96  
CA 8.3* 8.5 7.9*  
MG 1.6 2.0 1.9 Objective:  
 
Visit Vitals /76 (BP 1 Location: Right arm, BP Patient Position: At rest) Pulse 88 Temp 97.4 °F (36.3 °C) Resp 16 Ht 5' 6\" (1.676 m) Wt 61.2 kg (135 lb) SpO2 97% BMI 21.79 kg/m² Intake/Output Summary (Last 24 hours) at 5/2/2019 7754 Last data filed at 5/1/2019 1811 Gross per 24 hour Intake 900 ml Output 150 ml Net 750 ml EXAM: 
GENERAL: elderly, opens eyes to stimuli and turns head. Moves arms. . Wet cough, HEENT:  anicteric, no alar flaring or epistaxis, oral mucosa moist without cyanosis, NECK:  no jugular vein distention, no retractions, no thyromegaly or masses, LUNGS: rhonci and rales on right side HEART:  Regular rate and rhythm with no MGR; no edema is present, ABDOMEN:  soft with no tenderness, bowel sounds present, EXTREMITIES:  warm with no cyanosis, SKIN:  no jaundice or ecchymosis and NEUROLOGIC:  alert but not interactive, contracted on right side Reita Mouse, [place] 
[facility] [address]

## 2019-05-02 NOTE — PROGRESS NOTES
Palliative Medicine Consult Patient Name: Michelle Gould YOB: 1940 Date of Initial Consult: 4/29/2019 Reason for Consult: Care decisions Requesting Provider: Dr. Cassi Jung Primary Care Physician: Diana Amaya MD 
  
 SUMMARY:  
Michelle Gould is a 66 y.o. with a past history of stroke from October 2016, dysphasia secondary to stroke, paroxysmal A. fib, CHF, coronary disease, vascular dementia who was admitted on 4/28/2019 from [address] [place] [address] with a diagnosis of possible aspiration pneumonia, UTI. Current medical issues leading to Palliative Medicine involvement include: Care decisions. Past medical history/chart review patient is a 77-year-old female suffered a extensive stroke in October 2016. This is left her essentially bedbound, nonverbal, and on a puréed diet. She was seen by our team on several occasions but last visit was January 2017. At that time she was actually discharged home with [facility] [place]. Ultimately her family states she went to [address] [place] [address] where they attempted some rehab for about 30 days but now is essentially become a long-term care patient. She has remained nonverbal, nonambulatory, and requiring a puréed diet. According to her , he noticed some \"rattling\" in her chest and asked her to be sent to the hospital.  In the emergency room she had extensive work-up to include a CT scan of the chest that shows right lower lobe collapse/atelectasis, suspect recurrent aspiration. Speech therapy evaluation was attempted earlier today but unable to be completed secondary to mental status issues. According to family she is done okay on a puréed diet, but anytime attempts of feeding her something different were tried, she had symptoms of aspiration. Social history he is  and her  is at bedside. She has 4 adult children. PALLIATIVE DIAGNOSES:  
1. Goals of care discussion 2. Dysphasia with suspected aspiration related to a prior stroke and general debility. 3. Advanced care planning review 4. DNR discussion 5. Debility PLAN:  
1. Met with patient who remains nonverbal.  We did restart comfort feedings today and I can detect some secretions. Later her granddaughter return to the room. Discussed/reviewed the potential plan from yesterday. [facility] contacted her and equipment is being delivered today. She really would like her grandmother to go home today if possible. I was able to talk with Dr. Amara Toth and as long as equipment is delivered and family feels comfortable with her being discharged today that likely can happen. 2. Goals of care home with hospice care today assuming everything can be delivered on time. 3. Advance care planning patient has not completed an advanced medical directive and her  understands that he would service her medical power of . 4. CODE STATUS reviewed resuscitation and what CPR would entail. The patient's  had already signed a DO NOT RESUSCITATE form in 2016. He confirms those wishes 5. Symptom management if patient were to stay the evening I would add a scopolamine patch for secretions. 6. Psychosocial patient with excellent support from family. No spiritual concerns. 7. Discussed with bedside nurse, Dr. Amara Toth,  8. Initial consult note routed to primary continuity provider 9. Communicated plan of care with: Palliative IDT 
 
 
 GOALS OF CARE / TREATMENT PREFERENCES:  
[====Goals of Care====] GOALS OF CARE: 
Patient/Health Care Proxy Stated Goals: Other (comment)(Having to debate possible PEG) TREATMENT PREFERENCES:  
Code Status: DNR Advance Care Planning: 
Advance Care Planning 5/2/2019 Patient's Healthcare Decision Maker is: Legal Next of Kin Primary Decision Maker Name -  
Primary Decision Maker Phone Number -  
 Primary Decision Maker Relationship to Patient - Secondary Decision Maker Name - Secondary Decision Maker Phone Number - Secondary Decision Maker Relationship to Patient -  
Confirm Advance Directive None Patient Would Like to Complete Advance Directive Unable Does the patient have other document types Do Not Resuscitate Medical Interventions: Limited additional interventions Other Instructions: The palliative care team has discussed with patient / health care proxy about goals of care / treatment preferences for patient. 
[====Goals of Care====] HISTORY:  
 
History obtained from: Chart, , daughter-in-law CHIEF COMPLAINT: Right Sola HPI/SUBJECTIVE: The patient is:  
[] Verbal and participatory [x] Non-participatory due to: Stroke 4/30 patient remains essentially nonverbal.  She is much more awake looking around the room and holding on the family's hands 5/1 patient at times appears slightly more interactive but sleeping at other times. 5/2 patient once again remains nonverbal.  Some mild secretions and cough noted. Clinical Pain Assessment (nonverbal scale for severity on nonverbal patients):  
Clinical Pain Assessment Severity: 0 Adult Nonverbal Pain Scale Face: No particular expression or smile Activity (Movement): Lying quietly, normal position Guarding: Lying quietly, no positioning of hands over areas of body Physiology (Vital Signs): Stable vital signs Respiratory: Baseline RR/SpO2 compliant with ventilator Total Score: 0 
 
 
 FUNCTIONAL ASSESSMENT:  
 
Palliative Performance Scale (PPS): PPS: 40 PSYCHOSOCIAL/SPIRITUAL SCREENING:  
 
Advance Care Planning: 
Advance Care Planning 5/2/2019 Patient's Healthcare Decision Maker is: Legal Next of Kin Primary Decision Maker Name -  
Primary Decision Maker Phone Number -  
Primary Decision Maker Relationship to Patient - Secondary Decision Maker Name -  
 Secondary Decision Maker Phone Number - Secondary Decision Maker Relationship to Patient -  
Confirm Advance Directive None Patient Would Like to Complete Advance Directive Unable Does the patient have other document types Do Not Resuscitate Any spiritual / Gnosticism concerns: 
[] Yes /  [x] No 
 
Caregiver Burnout: 
[] Yes /  [x] No /  [] No Caregiver Present Anticipatory grief assessment:  
[x] Normal  / [] Maladaptive ESAS Anxiety: Anxiety: 0 
 
ESAS Depression: Depression: 0 REVIEW OF SYSTEMS:  
 
Positive and pertinent negative findings in ROS are noted above in HPI. The following systems were [x] reviewed / [] unable to be reviewed as noted in HPI Other findings are noted below. Systems: constitutional, ears/nose/mouth/throat, respiratory, gastrointestinal, genitourinary, musculoskeletal, integumentary, neurologic, psychiatric, endocrine. Positive findings noted below. Modified ESAS Completed by: provider Fatigue: 3 Drowsiness: 1 Depression: 0 Pain: 0 Anxiety: 0 Nausea: 0 Anorexia: 2 Dyspnea: 3 Constipation: No  
  Stool Occurrence(s): 1 PHYSICAL EXAM:  
 
From RN flowsheet: 
Wt Readings from Last 3 Encounters:  
04/29/19 135 lb (61.2 kg) 01/09/17 136 lb (61.7 kg) 01/03/17 150 lb (68 kg) Blood pressure 145/84, pulse 79, temperature 98 °F (36.7 °C), resp. rate 16, height 5' 6\" (1.676 m), weight 135 lb (61.2 kg), SpO2 96 %. Pain Scale 1: Adult Nonverbal Pain Scale Pain Intensity 1: 0 Last bowel movement, if known:  
 
Constitutional: No acute distress, nonverbal, few secretions noted with associated cough Eyes: pupils equal, anicteric ENMT: no nasal discharge, moist mucous membranes Cardiovascular: regular rhythm, distal pulses intact Respiratory: breathing not labored, decreased at the right base, secretions noted. Gastrointestinal: soft non-tender, +bowel sounds Musculoskeletal: no deformity, no tenderness to palpation Skin: warm, dry Neurologic: following some commands Psychiatric: Nonverbal 
Other: 
 
 
 HISTORY:  
 
Active Problems: 
  Mitral regurgitation (10/22/2010) Chronic diastolic heart failure ([address] (2/26/2011) Paroxysmal atrial fibrillation ([address] (11/29/2011) Hypertrophic cardiomyopathy ([address] (6/1/2012) Glaucoma (1/13/2014) Cerebral atrophy (10/25/2016) Dementia (10/25/2016) CAD (coronary artery disease) () Hx of completed stroke (4/28/2019) Elevated troponin (4/28/2019) Pneumonia (4/28/2019) UTI (urinary tract infection) (4/28/2019) Past Medical History:  
Diagnosis Date  CAD (coronary artery disease)  Carotid occlusion, left 10/25/2016  Cerebral atrophy 10/25/2016  Dementia  Depression  Glaucoma  Hx of completed stroke  Hyperlipidemia LDL goal <70 10/25/2016  Hypertension  Hypertrophic cardiomyopathy ([address] 6/1/2012  Hypertrophic subaortic stenosis (idiopathic) (HCC)  Mitral regurgitation  Paroxysmal atrial fibrillation ([address] 11/29/2011 Past Surgical History:  
Procedure Laterality Date [address]  2004 Normal cors, EF 60%, significant MR. EDP 23  
 CARDIAC SURG PROCEDURE UNLIST  ECHO 2D ADULT  8/20/2010 HCM, Resting LVOT gradient 129 mmHg, grade 3-4 diastolic dysfunction, MAC, mod-severe MR, marked LAE.  ECHO 2D ADULT  5/27/11  
 mod-severe LVH, EF 65%, grade 3-4 diastolic dysfunction, LVOT resting gradient 42 mmHg, severe LAE, mod-severe MR, PA 46 mmHg Family History Problem Relation Age of Onset  Other Other   
     patient specifically denies the following in 1st degree relative: HTN, DM, CVA, CAD, ca  
 Heart Disease Mother History reviewed, no pertinent family history. Social History Tobacco Use  Smoking status: Never Smoker  Smokeless tobacco: Never Used Substance Use Topics  Alcohol use: No  
  Alcohol/week: 0.0 oz  
 
 No Known Allergies Current Facility-Administered Medications Medication Dose Route Frequency  piperacillin-tazobactam (ZOSYN) 3.375 g in 0.9% sodium chloride (MBP/ADV) 100 mL  3.375 g IntraVENous Q8H  
 dextrose 5 % - 0.45% NaCl infusion  75 mL/hr IntraVENous CONTINUOUS  
 hydrALAZINE (APRESOLINE) 20 mg/mL injection 10 mg  10 mg IntraVENous Q6H PRN  
 morphine injection 1 mg  1 mg IntraVENous Q2H PRN  
 ondansetron (ZOFRAN) injection 4 mg  4 mg IntraVENous Q6H PRN  
 guaiFENesin (ROBITUSSIN) 100 mg/5 mL oral liquid 100 mg  100 mg Oral Q4H PRN  
 albuterol-ipratropium (DUO-NEB) 2.5 MG-0.5 MG/3 ML  3 mL Nebulization Q6H RT  
 sodium chloride (NS) flush 5-40 mL  5-40 mL IntraVENous Q8H  
 sodium chloride (NS) flush 5-40 mL  5-40 mL IntraVENous PRN  
 acetaminophen (TYLENOL) tablet 650 mg  650 mg Oral Q6H PRN  
 naloxone (NARCAN) injection 0.4 mg  0.4 mg IntraVENous PRN  
 brimonidine (ALPHAGAN) 0.2 % ophthalmic solution 1 Drop  1 Drop Right Eye BID  dorzolamide-timolol (COSOPT) 22.3-6.8 mg/mL ophthalmic solution 1 Drop  1 Drop Right Eye BID  geriatric multivitamins & minerals (ELDERTONIC) oral elixir 15 mL  15 mL Oral TIDAC  latanoprost (XALATAN) 0.005 % ophthalmic solution 1 Drop  1 Drop Right Eye QHS  enoxaparin (LOVENOX) injection 40 mg  40 mg SubCUTAneous Q24H  
 
 
 
 LAB AND IMAGING FINDINGS:  
 
Lab Results Component Value Date/Time WBC 10.6 05/01/2019 03:37 AM  
 HGB 12.4 05/01/2019 03:37 AM  
 PLATELET 599 24/10/3801 03:37 AM  
 
Lab Results Component Value Date/Time Sodium 140 05/01/2019 03:37 AM  
 Potassium 3.6 05/01/2019 03:37 AM  
 Chloride 109 (H) 05/01/2019 03:37 AM  
 CO2 22 05/01/2019 03:37 AM  
 BUN 13 05/01/2019 03:37 AM  
 Creatinine 0.80 05/01/2019 03:37 AM  
 Calcium 8.3 (L) 05/01/2019 03:37 AM  
 Magnesium 1.6 05/01/2019 03:37 AM  
 Phosphorus 1.8 (L) 04/29/2019 03:00 AM  
  
Lab Results Component Value Date/Time  AST (SGOT) 18 04/29/2019 03:00 AM  
 Alk. phosphatase 51 04/29/2019 03:00 AM  
 Protein, total 5.4 (L) 04/29/2019 03:00 AM  
 Albumin 2.1 (L) 04/29/2019 03:00 AM  
 Globulin 3.3 04/29/2019 03:00 AM  
 
Lab Results Component Value Date/Time INR 1.1 04/28/2019 07:02 PM  
 Prothrombin time 10.8 04/28/2019 07:02 PM  
 aPTT 23.1 04/28/2019 07:02 PM  
  
Lab Results Component Value Date/Time Iron 49 01/15/2014 11:45 AM  
 TIBC 252 01/15/2014 11:45 AM  
 Iron % saturation 19 (L) 01/15/2014 11:45 AM  
 Ferritin 84 01/15/2014 11:45 AM  
  
No results found for: PH, PCO2, PO2 No components found for: [place] Lab Results Component Value Date/Time CK 30 04/30/2019 03:43 AM  
 CK - MB 1.8 04/30/2019 03:43 AM  
  
 
 
   
 
Total time:25 Counseling / coordination time, spent as noted above: 20 
> 50% counseling / coordination?: yes Prolonged service was provided for  []30 min   []75 min in face to face time in the presence of the patient, spent as noted above. Time Start:  
Time End:  
Note: this can only be billed with 03352 (initial) or 78016 (follow up). If multiple start / stop times, list each separately.

## 2019-05-02 NOTE — ACP (ADVANCE CARE PLANNING)
Advance Care Planning 5/2/2019   Patient's Healthcare Decision Maker is: Legal Next of Kin   Primary Decision Maker Name Arnel Brownlee   Primary Decision Maker Phone Number [phone]   Primary Decision Maker Relationship to Patient    Secondary Decision Maker Name -   Secondary Decision Maker Phone Number -   Secondary Decision Maker Relationship to Patient -   Confirm Advance Directive None   Patient Would Like to Complete Advance Directive Unable   Does the patient have other document types Durable Do Not Resuscitate     Pt does not have AMD in place,  Berna Harrison is legal NOK/surrogate decision maker. Pt has DDNR on file.

## 2019-05-02 NOTE — PROGRESS NOTES
Nutrition: 
 
Spoke with RN. Noted consult for hospice and orders for comfort care. Diet has been advanced to mech soft. Pt still very lethargic. Monitor POC and consult if any additional nutrition needs arise. Nicholas Anaya, [facility]

## 2019-05-02 NOTE — PROGRESS NOTES
0600: Patient would not cooperate this morning for labs; she kept pulling away and labs could not be drawn.

## 2019-05-02 NOTE — PALLIATIVE CARE DISCHARGE
Goals of Care/Treatment Preferences The Palliative Medicine team was consulted as part of your/your loved one's care in the hospital. Our team is a supportive service; we strive to relieve suffering and improve quality of life. We reviewed advance care planning information, which includes the following: 
Patient's [address] is[de-identified] Legal Next of Kin Confirm Advance Directive: None Patient Would Like to Complete Advance Directive: Unable Does the patient have other document types: Do Not Resuscitate We reviewed / discussed your code status as: DNR 
   Full Code means perform CPR in the event of cardiac arrest. 
    DNR means do NOT perform CPR in the event of cardiac arrest. 
    Partial Code means you have specific preferences, please discuss with your healthcare team. 
    Nakia Cardona means this issue was not addressed / resolved during your stay Medical Interventions: Comfort measures Other Instructions: You will be returning home with support from [facility]. You have a Durable Do Not Resuscitate Order in place, which should travel with you. When you are in a facility, this form should be placed on your chart. Once you are home, it is recommended that the [facility] form be placed in a visible location such as on the refrigerator or bedroom door. Because of the importance of this information, we are providing you with a printed copy to share with other healthcare providers after this hospitalization is complete.

## 2019-05-02 NOTE — DISCHARGE INSTRUCTIONS
HOSPITALIST DISCHARGE INSTRUCTIONS  NAME: Chapis Dixon   :  1940   MRN:  [id number]     Date/Time:  2019 4:00 PM    ADMIT DATE: 2019     DISCHARGE DATE: 2019     ADMITTING DIAGNOSIS:  Aspiration pneumonia     DISCHARGE DIAGNOSIS:  As above     MEDICATIONS:     · It is important that you take the medication exactly as they are prescribed. · Keep your medication in the bottles provided by the pharmacist and keep a list of the medication names, dosages, and times to be taken in your wallet. · Do not take other medications without consulting your doctor. Pain Management: per above medications    What to do at Home    Recommended diet:  Comfort feeding    Recommended activity: Activity as tolerated    If you experience any of the following symptoms then please call your primary care physician or return to the emergency room if you cannot get hold of your doctor:  Fever, chills, nausea, vomiting, diarrhea, change in mentation, falling, bleeding, shortness of breath, chest pain     Follow Up:  Dr. Lyndsay Mejia MD  you are to call and set up an appointment to see them in 2 weeks. Information obtained by :  I understand that if any problems occur once I am at home I am to contact my physician. I understand and acknowledge receipt of the instructions indicated above.                                                                                                                                            Physician's or R.N.'s Signature                                                                  Date/Time                                                                                                                                              Patient or Representative Signature                                                          Date/Time

## 2019-05-04 LAB
BACTERIA SPEC CULT: NORMAL
SERVICE CMNT-IMP: NORMAL
